# Patient Record
Sex: FEMALE | Race: WHITE | NOT HISPANIC OR LATINO | Employment: UNEMPLOYED | URBAN - METROPOLITAN AREA
[De-identification: names, ages, dates, MRNs, and addresses within clinical notes are randomized per-mention and may not be internally consistent; named-entity substitution may affect disease eponyms.]

---

## 2017-04-18 ENCOUNTER — HOSPITAL ENCOUNTER (EMERGENCY)
Facility: MEDICAL CENTER | Age: 27
End: 2017-04-18
Payer: MEDICAID

## 2017-04-18 VITALS
HEART RATE: 100 BPM | BODY MASS INDEX: 19.03 KG/M2 | DIASTOLIC BLOOD PRESSURE: 61 MMHG | WEIGHT: 132.94 LBS | RESPIRATION RATE: 14 BRPM | HEIGHT: 70 IN | SYSTOLIC BLOOD PRESSURE: 105 MMHG | OXYGEN SATURATION: 97 % | TEMPERATURE: 100.5 F

## 2017-04-18 LAB
ALBUMIN SERPL BCP-MCNC: 3.8 G/DL (ref 3.2–4.9)
ALBUMIN/GLOB SERPL: 1.2 G/DL
ALP SERPL-CCNC: 75 U/L (ref 30–99)
ALT SERPL-CCNC: 16 U/L (ref 2–50)
ANION GAP SERPL CALC-SCNC: 8 MMOL/L (ref 0–11.9)
AST SERPL-CCNC: 23 U/L (ref 12–45)
BASOPHILS # BLD AUTO: 0.3 % (ref 0–1.8)
BASOPHILS # BLD: 0.02 K/UL (ref 0–0.12)
BILIRUB SERPL-MCNC: 0.5 MG/DL (ref 0.1–1.5)
BUN SERPL-MCNC: 8 MG/DL (ref 8–22)
CALCIUM SERPL-MCNC: 9.3 MG/DL (ref 8.5–10.5)
CHLORIDE SERPL-SCNC: 103 MMOL/L (ref 96–112)
CO2 SERPL-SCNC: 23 MMOL/L (ref 20–33)
CREAT SERPL-MCNC: 0.65 MG/DL (ref 0.5–1.4)
EOSINOPHIL # BLD AUTO: 0 K/UL (ref 0–0.51)
EOSINOPHIL NFR BLD: 0 % (ref 0–6.9)
ERYTHROCYTE [DISTWIDTH] IN BLOOD BY AUTOMATED COUNT: 39.8 FL (ref 35.9–50)
GFR SERPL CREATININE-BSD FRML MDRD: >60 ML/MIN/1.73 M 2
GLOBULIN SER CALC-MCNC: 3.1 G/DL (ref 1.9–3.5)
GLUCOSE SERPL-MCNC: 136 MG/DL (ref 65–99)
HCT VFR BLD AUTO: 37.3 % (ref 37–47)
HGB BLD-MCNC: 12.7 G/DL (ref 12–16)
IMM GRANULOCYTES # BLD AUTO: 0.02 K/UL (ref 0–0.11)
IMM GRANULOCYTES NFR BLD AUTO: 0.3 % (ref 0–0.9)
LACTATE BLD-SCNC: 1.4 MMOL/L (ref 0.5–2)
LYMPHOCYTES # BLD AUTO: 0.66 K/UL (ref 1–4.8)
LYMPHOCYTES NFR BLD: 9.6 % (ref 22–41)
MCH RBC QN AUTO: 29.3 PG (ref 27–33)
MCHC RBC AUTO-ENTMCNC: 34 G/DL (ref 33.6–35)
MCV RBC AUTO: 86.1 FL (ref 81.4–97.8)
MONOCYTES # BLD AUTO: 0.41 K/UL (ref 0–0.85)
MONOCYTES NFR BLD AUTO: 6 % (ref 0–13.4)
NEUTROPHILS # BLD AUTO: 5.75 K/UL (ref 2–7.15)
NEUTROPHILS NFR BLD: 83.8 % (ref 44–72)
NRBC # BLD AUTO: 0 K/UL
NRBC BLD AUTO-RTO: 0 /100 WBC
PLATELET # BLD AUTO: 162 K/UL (ref 164–446)
PMV BLD AUTO: 11.1 FL (ref 9–12.9)
POTASSIUM SERPL-SCNC: 3.6 MMOL/L (ref 3.6–5.5)
PROT SERPL-MCNC: 6.9 G/DL (ref 6–8.2)
RBC # BLD AUTO: 4.33 M/UL (ref 4.2–5.4)
SODIUM SERPL-SCNC: 134 MMOL/L (ref 135–145)
WBC # BLD AUTO: 6.9 K/UL (ref 4.8–10.8)

## 2017-04-18 PROCEDURE — 85025 COMPLETE CBC W/AUTO DIFF WBC: CPT

## 2017-04-18 PROCEDURE — 87040 BLOOD CULTURE FOR BACTERIA: CPT

## 2017-04-18 PROCEDURE — 80053 COMPREHEN METABOLIC PANEL: CPT

## 2017-04-18 PROCEDURE — 302449 STATCHG TRIAGE ONLY (STATISTIC)

## 2017-04-18 PROCEDURE — 83605 ASSAY OF LACTIC ACID: CPT

## 2017-04-18 ASSESSMENT — PAIN SCALES - GENERAL: PAINLEVEL_OUTOF10: 7

## 2017-04-18 NOTE — ED NOTES
Pt to triage .  Chief Complaint   Patient presents with   • Body Aches   • Fever   • Nausea   • Abnormal Labs     recent strep infection after abcess to right wrist. pt states she left [facility] from [facility] 2wks ago and now feeling worse

## 2017-04-23 LAB
BACTERIA BLD CULT: NORMAL
SIGNIFICANT IND 70042: NORMAL
SITE SITE: NORMAL
SOURCE SOURCE: NORMAL

## 2017-11-05 ENCOUNTER — HOSPITAL ENCOUNTER (EMERGENCY)
Facility: MEDICAL CENTER | Age: 27
End: 2017-11-05
Attending: EMERGENCY MEDICINE
Payer: MEDICAID

## 2017-11-05 VITALS
OXYGEN SATURATION: 100 % | HEIGHT: 70 IN | RESPIRATION RATE: 16 BRPM | TEMPERATURE: 98 F | BODY MASS INDEX: 18.61 KG/M2 | SYSTOLIC BLOOD PRESSURE: 111 MMHG | HEART RATE: 69 BPM | WEIGHT: 130 LBS | DIASTOLIC BLOOD PRESSURE: 78 MMHG

## 2017-11-05 DIAGNOSIS — T40.1X1A HEROIN OVERDOSE, ACCIDENTAL OR UNINTENTIONAL, INITIAL ENCOUNTER (HCC): ICD-10-CM

## 2017-11-05 LAB — EKG IMPRESSION: NORMAL

## 2017-11-05 PROCEDURE — 99284 EMERGENCY DEPT VISIT MOD MDM: CPT

## 2017-11-05 PROCEDURE — 93005 ELECTROCARDIOGRAM TRACING: CPT | Performed by: EMERGENCY MEDICINE

## 2017-11-05 NOTE — ED NOTES
Chief Complaint   Patient presents with   • Drug Overdose     Patient bib PAIGE, states she just got out of shelter and used heroin this am. Boyfriend called EMS because patient was unresponsive. FD gave 0.4mg IM Narcan. Patient awake, alert. VSS, ERP to eval.

## 2017-11-05 NOTE — ED PROVIDER NOTES
"ED Provider Note    CHIEF COMPLAINT  Chief Complaint   Patient presents with   • Drug Overdose       HPI  Luna River is a 27 y.o. female who presents Via EMS following a heroin overdose.    Patient states she was attempting to \"get high\" and awoke after her boyfriend called 911. Patient was recently incarcerated and had not used heroin for 8 days. Patient denies any physical complaints. Patient denies suicidality.      ALLERGIES  Allergies   Allergen Reactions   • Clonazepam      Pt states that she overdosed on it before. Furthermore, per historical, pt had seizure-like activity while taking Clonazepam as a child.   • Gabadone      Unknown reaction.   • Gabapentin      Causes seizures.   • Tiagabine Hydrochloride      Unknown reaction.       CURRENT MEDICATIONS  Denies    PAST MEDICAL HISTORY   has a past medical history of Anorexia; Bipolar 2 disorder (CMS-HCC); bulemia; Hepatitis C; Impetigo; Major depressive disorder; Mood disorder (CMS-[facility]); Oppositional defiant disorder; Polysubstance abuse; Post-traumatic stress disorder; Schizoaffective disorder; and Scoliosis.    SURGICAL HISTORY   has a past surgical history that includes tonsillectomy; removal of tonsils,<13 y/o; other; and anthony by laparoscopy (2/1/2013).    SOCIAL HISTORY  Social History     Social History Main Topics   • Smoking status: Current Every Day Smoker     Packs/day: 1.00     Years: 7.00     Types: Cigarettes   • Smokeless tobacco: Not on file      Comment: occas   • Alcohol use No      Comment: ocasional   • Drug use:      Types: Intravenous, Inhaled, Injected (Skin Popping), Oral      Comment: heroin, meth   • Sexual activity: Not on file       REVIEW OF SYSTEMS  See HPI for further details.  All other systems are negative except as above in HPI.      PHYSICAL EXAM  VITAL SIGNS: /78   Pulse 75   Temp 36.7 °C (98 °F)   Resp 15   Ht 1.778 m (5' 10\")   Wt 59 kg (130 lb)   SpO2 100%   BMI 18.65 kg/m²     General:  WDWN, " nontoxic appearing in NAD; A+Ox3; V/S as above   Skin: warm and dry; good color; no rash  HEENT: NCAT; EOMs intact; PERRL; no scleral icterus; Poor dentition  Neck: Soft, supple  Cardiovascular: Regular heart rate and rhythm.  No murmurs, rubs, or gallops; pulses 2+ bilaterally radially and DP areas  Lungs: Clear to auscultation with good air movement bilaterally.  No wheezes, rhonchi, or rales.   Abdomen: BS present; soft; NTND; no rebound, guarding, or rigidity.  No organomegaly or pulsatile mass  Extremities: HARDIN x 4; no e/o trauma; no pedal edema; neg Saurabh's  Neurologic: CNs III-XII grossly intact; speech clear; distal sensation intact; strength 5/5 UE/LEs  Psychiatric: Appropriate affect, normal mood      MEDICAL RECORD  I have reviewed patient's medical record and pertinent results are listed below.      COURSE & MEDICAL DECISION MAKING  I have reviewed any medical record information, laboratory studies and radiographic results as noted.    Luna River is a 27 y.o. female who presents complaining of heroin overdose. This was accidental. Patient denies suicidality. Patient has no physical complaints. She has no plans to discontinue heroin use.    Pt was re-evaluated at 2:16 PM  SW asked to meet with patient regarding rehab programs    The patient was observed for greater than 2 hours here in the ER. She remained alert and was discharged.      FINAL IMPRESSION  1. Heroin overdose, accidental or unintentional, initial encounter        Electronically signed by: Daksha Clements, 11/5/2017 11:55 AM

## 2017-11-05 NOTE — DISCHARGE PLANNING
"SW met with pt at bedside. SW educated pt on substance abuse, clinic, and general resources. Pt reported that she is not yet ready to quit, but plans on going to the methadone clinic \"when she feels ready.\" SW stressed the importance of safety and health as an active drug user. Pt stated understanding and reported that she has a \"safe place\" to stay. SW ensured that pt had an understanding of resources and no questions regarding the information. Pt reported that she had no questions or further needs and was encouraged to follow up. SW to remain available.     SW informed RN that pt was MTM eligible. Pt to call MTM for transportation assistance.   "

## 2017-11-05 NOTE — DISCHARGE INSTRUCTIONS
"Return to ER for worsening symptoms or other issues      Overdose, Adult  A person can overdose on alcohol, drugs or both by accident or on purpose. If it was on purpose, it is a serious matter. Professional help should be sought. If the overdose was an accident, certain steps should be taken to make sure that it never happens again.  ACCIDENTAL OVERDOSE  Overdosing on prescription medications can be a result of:  · Not understanding the instructions.  · Misreading the label.  · Forgetting that you took a dose and then taking another by mistake. This situation happens a lot.  To make sure this does not happen again:  · Clarify the correct dosage with your caregiver.  · Place the correct dosage in a \"pill-minder\" container (labeled for each day and time of day).  · Have someone dispense your medicine.  Please be sure to follow-up with your primary care doctor as directed.  INTENTIONAL OVERDOSE  If the overdose was on purpose, it is a serious situation. Taking more than the prescribed amount of medications (including taking someone else's prescription), abusing street drugs or drinking an amount of alcohol that requires medical treatment can show a variety of possible problems. These may indicate you:  · Are depressed or suicidal.  · Are abusing drugs, took too much or combined different drugs to experiment with the effects.  · Mixed alcohol with drugs and did not realize the danger of doing so (this is drug abuse).  · Are suffering addiction to drugs and/or alcohol (also known as chemical dependency).  · Binge drink.  If you have not been referred to a mental health professional for help, it is important that you get help right away. Only a professional can determine which problems may exist and what the best course of treatment may be. It is your responsibility to follow-up with further evaluation or treatment as directed.   Alcohol is responsible for a large number of overdoses and unintended deaths among " "college-age young adults. Binge drinking is consuming 4-5 drinks in a short period of time. The amount of alcohol in standard servings of wine (5 oz.), beer (12 oz.) and distilled spirits (1.5 oz., 80 proof) is the same. Beer or wine can be just as dangerous to the binge drinker as \"hard\" liquor can be.   CONSEQUENCES OF BINGE DRINKING  Alcohol poisoning is the most serious consequence of binge drinking. This is a severe and potentially fatal physical reaction to an alcohol overdose. When too much alcohol is consumed, the brain does not get enough oxygen. The lack of oxygen will eventually cause the brain to shut down the voluntary functions that regulate breathing and heart rate. Symptoms of alcohol poisoning include:  · Vomiting.  · Passing out (unconsciousness).  · Cold, clammy, pale or bluish skin.  · Slow or irregular breathing.  WHAT SHOULD I DO NEXT?  If you have a history of drug abuse or suffer chemical dependency (alcoholism, drug addiction or both), you might consider the following:  · Talk with a qualified substance abuse counselor and consider entering a treatment program.  · Go to a detox facility if necessary.  · If you were attending self-help group meetings, consider returning to them and go often.  · Explore other resources located near you (see sources listed below).  If you are unsure if you have a substance abuse problem, ask yourself the following questions:  · Have you been told by friends or family that drugs/alcohol has become a problem?  · Do you get into fights when drinking or using drugs?  · Do you have blackouts (not remembering what you do while using)?  · Do you lie about use or amounts of drugs or alcohol you consume?  · Do you need chemicals to get you going?  · Do you suffer in work or school performance because of drug or alcohol use?  · Do you get sick from drug or alcohol use but continue to use anyway?  · Do you need drugs or alcohol to relate to people or feel comfortable in " "social situations?  · Do you use drugs or alcohol to forget problems?  If you answered \"Yes\" to any of the above questions, it means you show signs of chemical dependency and a professional evaluation is suggested. The longer the use of drugs and alcohol continues, the problems will become greater.  SEEK IMMEDIATE MEDICAL CARE IF:   · You feel like you might repeat your problematic behavior.  · You need someone to talk to and feel that it should not wait.  · You feel you are a danger to yourself or someone else.  · You feel like you are having a new reaction to medications you are taking, or you are getting worse after leaving a care center.  · You have an overwhelming urge to drink or use drugs.  Addiction cannot be cured, but it can be treated successfully. Treatment centers are listed in the yellow pages under: Cocaine, Narcotics, and Alcoholics Anonymous. Most hospitals and clinics can refer you to a specialized care center. The  government maintains a toll-free number for obtaining treatment referrals: 1-784.910.3979 or 1-738.123.2514 (TDD) and maintains a website: http://findtreatment.samhsa.gov. Other websites for additional information are: www.mentalhealth.samhsa.gov. and www.feroz.gov.  [place] treatment resources are listed in each Province with listings available under [employer] or similar titles.  Document Released: 12/20/2004 Document Revised: 03/11/2013 Document Reviewed: 11/11/2009  ExitCare® Patient Information ©2014 [employer], [employer].    "

## 2018-02-16 ENCOUNTER — HOSPITAL ENCOUNTER (EMERGENCY)
Facility: MEDICAL CENTER | Age: 28
End: 2018-02-17
Payer: MEDICAID

## 2018-02-16 VITALS
TEMPERATURE: 99.8 F | HEART RATE: 97 BPM | DIASTOLIC BLOOD PRESSURE: 70 MMHG | SYSTOLIC BLOOD PRESSURE: 118 MMHG | HEIGHT: 70 IN | BODY MASS INDEX: 19.73 KG/M2 | RESPIRATION RATE: 16 BRPM | OXYGEN SATURATION: 98 % | WEIGHT: 137.79 LBS

## 2018-02-16 PROCEDURE — 302449 STATCHG TRIAGE ONLY (STATISTIC)

## 2018-02-16 ASSESSMENT — PAIN SCALES - GENERAL: PAINLEVEL_OUTOF10: 7

## 2018-02-17 ENCOUNTER — HOSPITAL ENCOUNTER (EMERGENCY)
Dept: HOSPITAL 8 - ED | Age: 28
Discharge: HOME | End: 2018-02-17
Payer: MEDICAID

## 2018-02-17 VITALS — WEIGHT: 138.01 LBS | HEIGHT: 70 IN | BODY MASS INDEX: 19.76 KG/M2

## 2018-02-17 VITALS — DIASTOLIC BLOOD PRESSURE: 86 MMHG | SYSTOLIC BLOOD PRESSURE: 125 MMHG

## 2018-02-17 DIAGNOSIS — F11.10: ICD-10-CM

## 2018-02-17 DIAGNOSIS — L02.414: Primary | ICD-10-CM

## 2018-02-17 PROCEDURE — 10060 I&D ABSCESS SIMPLE/SINGLE: CPT

## 2018-02-17 PROCEDURE — 99283 EMERGENCY DEPT VISIT LOW MDM: CPT

## 2018-02-17 NOTE — ED TRIAGE NOTES
"Chief Complaint   Patient presents with   • Wrist Pain     L wrist pain at abscess site   • Abscess     Pt reports is a heroin user and that abscess began to develop 2 weeks ago      /70   Pulse 97   Temp 37.7 °C (99.8 °F) (Temporal)   Resp 16   Ht 1.778 m (5' 10\")   Wt 62.5 kg (137 lb 12.6 oz)   SpO2 98%   BMI 19.77 kg/m²     Ambulatory to triage, steady on feet. Presents for above complaint. Reports was clean but began using heroin recently again. Large swollen, reddened area noted to L hand/wrist. Pt returned to [facility], educated on wait times and triage, instructed to notify staff of worsening symptoms.   "

## 2018-03-11 ENCOUNTER — HOSPITAL ENCOUNTER (EMERGENCY)
Dept: HOSPITAL 8 - ED | Age: 28
Discharge: HOME | End: 2018-03-11
Payer: SELF-PAY

## 2018-03-11 VITALS — SYSTOLIC BLOOD PRESSURE: 93 MMHG | DIASTOLIC BLOOD PRESSURE: 55 MMHG

## 2018-03-11 VITALS — BODY MASS INDEX: 19.13 KG/M2 | HEIGHT: 70 IN | WEIGHT: 133.6 LBS

## 2018-03-11 DIAGNOSIS — L02.414: ICD-10-CM

## 2018-03-11 DIAGNOSIS — L02.413: ICD-10-CM

## 2018-03-11 DIAGNOSIS — L03.113: ICD-10-CM

## 2018-03-11 DIAGNOSIS — Z90.49: ICD-10-CM

## 2018-03-11 DIAGNOSIS — L03.114: ICD-10-CM

## 2018-03-11 DIAGNOSIS — L02.11: Primary | ICD-10-CM

## 2018-03-11 LAB
ALBUMIN SERPL-MCNC: 3.2 G/DL (ref 3.4–5)
ALP SERPL-CCNC: 97 U/L (ref 45–117)
ALT SERPL-CCNC: 20 U/L (ref 12–78)
ANION GAP SERPL CALC-SCNC: 7 MMOL/L (ref 5–15)
BASOPHILS # BLD AUTO: 0.02 X10^3/UL (ref 0–0.1)
BASOPHILS NFR BLD AUTO: 0 % (ref 0–1)
BILIRUB SERPL-MCNC: 0.4 MG/DL (ref 0.2–1)
CALCIUM SERPL-MCNC: 9.1 MG/DL (ref 8.5–10.1)
CHLORIDE SERPL-SCNC: 104 MMOL/L (ref 98–107)
CREAT SERPL-MCNC: 0.73 MG/DL (ref 0.55–1.02)
EOSINOPHIL # BLD AUTO: 0 X10^3/UL (ref 0–0.4)
EOSINOPHIL NFR BLD AUTO: 0 % (ref 1–7)
ERYTHROCYTE [DISTWIDTH] IN BLOOD BY AUTOMATED COUNT: 12.9 % (ref 9.6–15.2)
LYMPHOCYTES # BLD AUTO: 1.85 X10^3/UL (ref 1–3.4)
LYMPHOCYTES NFR BLD AUTO: 20 % (ref 22–44)
MCH RBC QN AUTO: 29.7 PG (ref 27–34.8)
MCHC RBC AUTO-ENTMCNC: 33.6 G/DL (ref 32.4–35.8)
MCV RBC AUTO: 88.4 FL (ref 80–100)
MD: NO
MONOCYTES # BLD AUTO: 0.47 X10^3/UL (ref 0.2–0.8)
MONOCYTES NFR BLD AUTO: 5 % (ref 2–9)
NEUTROPHILS # BLD AUTO: 6.96 X10^3/UL (ref 1.8–6.8)
NEUTROPHILS NFR BLD AUTO: 75 % (ref 42–75)
PLATELET # BLD AUTO: 342 X10^3/UL (ref 130–400)
PMV BLD AUTO: 7.1 FL (ref 7.4–10.4)
PROT SERPL-MCNC: 7.9 G/DL (ref 6.4–8.2)
RBC # BLD AUTO: 4.32 X10^6/UL (ref 3.82–5.3)

## 2018-03-11 PROCEDURE — 87040 BLOOD CULTURE FOR BACTERIA: CPT

## 2018-03-11 PROCEDURE — 99284 EMERGENCY DEPT VISIT MOD MDM: CPT

## 2018-03-11 PROCEDURE — 83605 ASSAY OF LACTIC ACID: CPT

## 2018-03-11 PROCEDURE — 10061 I&D ABSCESS COMP/MULTIPLE: CPT

## 2018-03-11 PROCEDURE — 36415 COLL VENOUS BLD VENIPUNCTURE: CPT

## 2018-03-11 PROCEDURE — 85025 COMPLETE CBC W/AUTO DIFF WBC: CPT

## 2018-03-11 PROCEDURE — 80053 COMPREHEN METABOLIC PANEL: CPT

## 2018-03-28 ENCOUNTER — HOSPITAL ENCOUNTER (EMERGENCY)
Dept: HOSPITAL 8 - ED | Age: 28
Discharge: HOME | End: 2018-03-28
Payer: COMMERCIAL

## 2018-03-28 VITALS — BODY MASS INDEX: 18.94 KG/M2 | HEIGHT: 70 IN | WEIGHT: 132.28 LBS

## 2018-03-28 VITALS — DIASTOLIC BLOOD PRESSURE: 71 MMHG | SYSTOLIC BLOOD PRESSURE: 115 MMHG

## 2018-03-28 DIAGNOSIS — F11.129: ICD-10-CM

## 2018-03-28 DIAGNOSIS — R55: Primary | ICD-10-CM

## 2018-03-28 DIAGNOSIS — F17.210: ICD-10-CM

## 2018-03-28 DIAGNOSIS — L02.512: ICD-10-CM

## 2018-03-28 DIAGNOSIS — Z90.49: ICD-10-CM

## 2018-03-28 LAB
ALBUMIN SERPL-MCNC: 3.5 G/DL (ref 3.4–5)
ANION GAP SERPL CALC-SCNC: 4 MMOL/L (ref 5–15)
BASOPHILS # BLD AUTO: 0.03 X10^3/UL (ref 0–0.1)
BASOPHILS NFR BLD AUTO: 0 % (ref 0–1)
CALCIUM SERPL-MCNC: 9.1 MG/DL (ref 8.5–10.1)
CHLORIDE SERPL-SCNC: 104 MMOL/L (ref 98–107)
CREAT SERPL-MCNC: 0.91 MG/DL (ref 0.55–1.02)
EOSINOPHIL # BLD AUTO: 0.01 X10^3/UL (ref 0–0.4)
EOSINOPHIL NFR BLD AUTO: 0 % (ref 1–7)
ERYTHROCYTE [DISTWIDTH] IN BLOOD BY AUTOMATED COUNT: 13 % (ref 9.6–15.2)
LYMPHOCYTES # BLD AUTO: 1.51 X10^3/UL (ref 1–3.4)
LYMPHOCYTES NFR BLD AUTO: 10 % (ref 22–44)
MCH RBC QN AUTO: 30.2 PG (ref 27–34.8)
MCHC RBC AUTO-ENTMCNC: 33.6 G/DL (ref 32.4–35.8)
MCV RBC AUTO: 89.8 FL (ref 80–100)
MD: NO
MONOCYTES # BLD AUTO: 0.71 X10^3/UL (ref 0.2–0.8)
MONOCYTES NFR BLD AUTO: 5 % (ref 2–9)
NEUTROPHILS # BLD AUTO: 13.11 X10^3/UL (ref 1.8–6.8)
NEUTROPHILS NFR BLD AUTO: 85 % (ref 42–75)
PLATELET # BLD AUTO: 286 X10^3/UL (ref 130–400)
PMV BLD AUTO: 8.1 FL (ref 7.4–10.4)
RBC # BLD AUTO: 4.89 X10^6/UL (ref 3.82–5.3)

## 2018-03-28 PROCEDURE — 85025 COMPLETE CBC W/AUTO DIFF WBC: CPT

## 2018-03-28 PROCEDURE — 93005 ELECTROCARDIOGRAM TRACING: CPT

## 2018-03-28 PROCEDURE — 84703 CHORIONIC GONADOTROPIN ASSAY: CPT

## 2018-03-28 PROCEDURE — 80307 DRUG TEST PRSMV CHEM ANLYZR: CPT

## 2018-03-28 PROCEDURE — 82040 ASSAY OF SERUM ALBUMIN: CPT

## 2018-03-28 PROCEDURE — 96374 THER/PROPH/DIAG INJ IV PUSH: CPT

## 2018-03-28 PROCEDURE — 96361 HYDRATE IV INFUSION ADD-ON: CPT

## 2018-03-28 PROCEDURE — 36415 COLL VENOUS BLD VENIPUNCTURE: CPT

## 2018-03-28 PROCEDURE — 99285 EMERGENCY DEPT VISIT HI MDM: CPT

## 2018-03-28 PROCEDURE — 80048 BASIC METABOLIC PNL TOTAL CA: CPT

## 2018-05-19 ENCOUNTER — HOSPITAL ENCOUNTER (EMERGENCY)
Facility: MEDICAL CENTER | Age: 28
End: 2018-05-19
Attending: EMERGENCY MEDICINE

## 2018-05-19 VITALS
SYSTOLIC BLOOD PRESSURE: 107 MMHG | TEMPERATURE: 97.5 F | HEART RATE: 87 BPM | BODY MASS INDEX: 19.16 KG/M2 | RESPIRATION RATE: 16 BRPM | WEIGHT: 133.82 LBS | DIASTOLIC BLOOD PRESSURE: 76 MMHG | HEIGHT: 70 IN | OXYGEN SATURATION: 98 %

## 2018-05-19 DIAGNOSIS — L03.90 CELLULITIS, UNSPECIFIED CELLULITIS SITE: ICD-10-CM

## 2018-05-19 PROCEDURE — 700102 HCHG RX REV CODE 250 W/ 637 OVERRIDE(OP): Performed by: EMERGENCY MEDICINE

## 2018-05-19 PROCEDURE — A9270 NON-COVERED ITEM OR SERVICE: HCPCS | Performed by: EMERGENCY MEDICINE

## 2018-05-19 PROCEDURE — 99284 EMERGENCY DEPT VISIT MOD MDM: CPT

## 2018-05-19 RX ORDER — SULFAMETHOXAZOLE AND TRIMETHOPRIM 800; 160 MG/1; MG/1
1 TABLET ORAL ONCE
Status: COMPLETED | OUTPATIENT
Start: 2018-05-19 | End: 2018-05-19

## 2018-05-19 RX ORDER — CEPHALEXIN 500 MG/1
500 CAPSULE ORAL 4 TIMES DAILY
Qty: 28 CAP | Refills: 0 | Status: SHIPPED | OUTPATIENT
Start: 2018-05-19 | End: 2018-05-26

## 2018-05-19 RX ORDER — SULFAMETHOXAZOLE AND TRIMETHOPRIM 800; 160 MG/1; MG/1
1 TABLET ORAL EVERY 12 HOURS
Qty: 14 TAB | Refills: 0 | Status: SHIPPED | OUTPATIENT
Start: 2018-05-19 | End: 2018-05-26

## 2018-05-19 RX ORDER — CEPHALEXIN 500 MG/1
500 CAPSULE ORAL ONCE
Status: COMPLETED | OUTPATIENT
Start: 2018-05-19 | End: 2018-05-19

## 2018-05-19 RX ADMIN — SULFAMETHOXAZOLE AND TRIMETHOPRIM 1 TABLET: 800; 160 TABLET ORAL at 10:23

## 2018-05-19 RX ADMIN — CEPHALEXIN 500 MG: 500 CAPSULE ORAL at 10:24

## 2018-05-19 ASSESSMENT — PAIN SCALES - GENERAL: PAINLEVEL_OUTOF10: 4

## 2018-05-19 ASSESSMENT — ENCOUNTER SYMPTOMS: FEVER: 0

## 2018-05-19 ASSESSMENT — LIFESTYLE VARIABLES: DO YOU DRINK ALCOHOL: NO

## 2018-05-19 NOTE — ED TRIAGE NOTES
"Chief Complaint   Patient presents with   • Abscess     Pt has several abscesses on arms and legs from shooting up.      /77   Pulse (!) 120   Temp 36.4 °C (97.5 °F)   Resp 18   Ht 1.778 m (5' 10\")   Wt 60.7 kg (133 lb 13.1 oz)   SpO2 98%   BMI 19.20 kg/m²   Pt placed back in lobby, educated on triage process, and told to inform staff of any change in condition.     "

## 2018-05-19 NOTE — ED NOTES
Discharge instructions given to pt including follow up w/[facility] or to return if worse.  Denies any new complaints. Discharged w/steady gait and stable vitals. Afebrile. Prescriptions x 2 given to pt.  Crackers and apple juice provided to pt.

## 2018-05-19 NOTE — ED PROVIDER NOTES
ED Provider Note    Scribed for Ariel Sauceda M.D. by Delonte Thomas. 5/19/2018, 9:54 AM.    Means of arrival: Walk-in  History obtained from: Patient  History limited by: None    CHIEF COMPLAINT  Chief Complaint   Patient presents with   • Abscess     Pt has several abscesses on arms and legs from shooting up.        HPI  Luna River is a 28 y.o. female who presents to the Emergency Department complaining of several possible abscesses as well diffuse bruising located on her bilateral upper and lower extremities secondary to IV heroin use. She reportedly lanced one of the abscesses yesterday. She denies fever. Her tetanus shot is up to date. She has no known antibiotic allergies. Patient is still using IV heroin and last used two days ago. She is attempting to quit using heroin and she has attempted to quit several times in the past. She is aware of substance addiction resources.    REVIEW OF SYSTEMS  Review of Systems   Constitutional: Negative for fever.   Skin:        Positive for abscesses and bruising.   E    PAST MEDICAL HISTORY   has a past medical history of Anorexia; Bipolar 2 disorder (HCC); bulemia; Hepatitis C; Impetigo; Major depressive disorder; Mood disorder (HCC); Oppositional defiant disorder; Polysubstance abuse; Post-traumatic stress disorder; Schizoaffective disorder; and Scoliosis.    SURGICAL HISTORY   has a past surgical history that includes tonsillectomy; removal of tonsils,<13 y/o; other; and anthony by laparoscopy (2/1/2013).    SOCIAL HISTORY  Social History   Substance Use Topics   • Smoking status: Current Every Day Smoker     Packs/day: 1.00     Years: 7.00     Types: Cigarettes              • Alcohol use No      Comment: ocasional      History   Drug Use   • Types: Intravenous, Inhaled, Injected (Skin Popping), Oral     Comment: heroin, meth       FAMILY HISTORY  None noted.    CURRENT MEDICATIONS  Home Medications     Reviewed by Haleigh Thomas R.N. (Registered  "Nurse) on 05/19/18 at 0934  Med List Status: Complete   Medication Last Dose Status        Patient Juan Taking any Medications                       ALLERGIES  Allergies   Allergen Reactions   • Clonazepam      Pt states that she overdosed on it before. Furthermore, per historical, pt had seizure-like activity while taking Clonazepam as a child.   • Gabadone      Unknown reaction.   • Gabapentin      Causes seizures.   • Tiagabine Hydrochloride      Unknown reaction.       PHYSICAL EXAM  VITAL SIGNS: /77   Pulse (!) 120   Temp 36.4 °C (97.5 °F)   Resp 18   Ht 1.778 m (5' 10\")   Wt 60.7 kg (133 lb 13.1 oz)   SpO2 98%   BMI 19.20 kg/m²     Constitutional: Alert in no apparent distress.  HENT: Normocephalic, Bilateral external ears normal. Nose normal. Poor dentition.  Lymphadenopathy: No axillary or inguinal lymphadenopathy  Eyes: Pupils are equal and reactive. Conjunctiva normal, non-icteric.   Thorax & Lungs: Easy unlabored respirations  Abdomen:  No gross signs of peritonitis, no pain with movement   Skin: Visualized skin is  Dry, No erythema, No rash.   Extremities:  No edema, No asymmetry. Bruising on the bilateral lower extremities. Multiple sites of erythema and excoriation, no obvious abscesses  Neurologic: Alert, Grossly non-focal.   Psychiatric: Affect and Mood normal    COURSE & MEDICAL DECISION MAKING  Nursing notes, VS, PMSFHx reviewed in chart.    9:54 AM - Patient seen and examined at bedside. Patient will be treated with cephalexin capsule 500 mg and sulfamethoxazole-trimethoprim 800-160 mg tablet. I will rule out abscess by ultrasound.    10:19 AM I evaluated for abscesses by ultrasound at this time. No abscess formation was found. I discussed her above findings and plans for discharge with a prescription for Keflex and Bactrim DS. She was given a referral to [facility] to establish a primary care and instructed to return to the ED if her symptoms worsen. Patient understands " and agrees.      The patient will return for new or worsening symptoms and is stable at the time of discharge.      DISPOSITION:  Patient will be discharged home in stable condition.    FOLLOW UP:  [address] [address]  [phone]          OUTPATIENT MEDICATIONS:  New Prescriptions    CEPHALEXIN (KEFLEX) 500 MG CAP    Take 1 Cap by mouth 4 times a day for 7 days.    SULFAMETHOXAZOLE-TRIMETHOPRIM (BACTRIM DS) 800-160 MG TABLET    Take 1 Tab by mouth every 12 hours for 7 days.         FINAL IMPRESSION  1. Cellulitis, unspecified cellulitis site          Delonte BOONE (Scribe), am scribing for, and in the presence of, Ariel Sauceda M.D..    Electronically signed by: Delonte Thomas (Scribe), 5/19/2018    Ariel BOONE M.D. personally performed the services described in this documentation, as scribed by Delonte Thomas in my presence, and it is both accurate and complete.    The note accurately reflects work and decisions made by me.  Ariel Sauceda  5/19/2018  3:44 PM

## 2018-05-19 NOTE — ED NOTES
Pt ambulated to room w/steady gait. Chart up for erp to see.  Changing into hospital gown. Barbra fall risk assessment done. Low risk.

## 2018-08-10 ENCOUNTER — HOSPITAL ENCOUNTER (EMERGENCY)
Facility: MEDICAL CENTER | Age: 28
End: 2018-08-10
Attending: EMERGENCY MEDICINE

## 2018-08-10 VITALS
SYSTOLIC BLOOD PRESSURE: 109 MMHG | BODY MASS INDEX: 18.84 KG/M2 | OXYGEN SATURATION: 98 % | TEMPERATURE: 98.4 F | RESPIRATION RATE: 18 BRPM | WEIGHT: 127.21 LBS | HEIGHT: 69 IN | HEART RATE: 100 BPM | DIASTOLIC BLOOD PRESSURE: 69 MMHG

## 2018-08-10 DIAGNOSIS — K04.7 DENTAL ABSCESS: ICD-10-CM

## 2018-08-10 PROCEDURE — 700101 HCHG RX REV CODE 250: Performed by: EMERGENCY MEDICINE

## 2018-08-10 PROCEDURE — 99283 EMERGENCY DEPT VISIT LOW MDM: CPT

## 2018-08-10 RX ORDER — PENICILLIN V POTASSIUM 500 MG/1
500 TABLET ORAL 4 TIMES DAILY
Qty: 40 TAB | Refills: 0 | Status: SHIPPED | OUTPATIENT
Start: 2018-08-10 | End: 2018-08-20

## 2018-08-10 RX ADMIN — LIDOCAINE HYDROCHLORIDE 20 ML: 10 INJECTION, SOLUTION INFILTRATION; PERINEURAL at 09:41

## 2018-08-10 ASSESSMENT — ENCOUNTER SYMPTOMS
FEVER: 0
VOMITING: 0

## 2018-08-10 ASSESSMENT — PAIN SCALES - GENERAL: PAINLEVEL_OUTOF10: 4

## 2018-08-10 NOTE — ED PROVIDER NOTES
ED Provider Note    ED Provider Note    Primary care provider: Pcp Pt States None  Means of arrival: POV  History obtained from: Patient  History limited by: None    CHIEF COMPLAINT  Chief Complaint   Patient presents with   • Tooth Abscess       HPI  Luna iRver is a 28 y.o. female who presents to the Emergency Department with a chief complaint of dental pain and possibly tooth abscess.  She states that her left bottom tooth has been bothering her for some time now on and off but she suddenly developed swelling this morning.  No difficulty swallowing.  No fever.    REVIEW OF SYSTEMS  Review of Systems   Constitutional: Negative for fever.   HENT: Negative for congestion.    Gastrointestinal: Negative for vomiting.       PAST MEDICAL HISTORY   has a past medical history of Anorexia; Bipolar 2 disorder (HCC); bulemia; Hepatitis C; Impetigo; Major depressive disorder; Mood disorder (HCC); Oppositional defiant disorder; Polysubstance abuse; Post-traumatic stress disorder; Schizoaffective disorder; and Scoliosis.    SURGICAL HISTORY   has a past surgical history that includes tonsillectomy; removal of tonsils,<13 y/o; other; and anthony by laparoscopy (2/1/2013).    SOCIAL HISTORY  Social History   Substance Use Topics   • Smoking status: Current Every Day Smoker     Packs/day: 1.00     Years: 7.00     Types: Cigarettes   • Smokeless tobacco: Never Used      Comment: occas   • Alcohol use No      History   Drug Use   • Types: Intravenous, Inhaled, Injected (Skin Popping), Oral     Comment: heroin, meth       FAMILY HISTORY  History reviewed. No pertinent family history.    CURRENT MEDICATIONS  Home Medications     Reviewed by Taylor Zaman R.N. (Registered Nurse) on 08/10/18 at 0754  Med List Status: Complete   Medication Last Dose Status        Patient Juan Taking any Medications                       ALLERGIES  Allergies   Allergen Reactions   • Clonazepam      Pt states that she overdosed on it before.  "Furthermore, per historical, pt had seizure-like activity while taking Clonazepam as a child.   • Gabadone      Unknown reaction.   • Gabapentin      Causes seizures.   • Tiagabine Hydrochloride      Unknown reaction.       PHYSICAL EXAM  VITAL SIGNS: /69   Pulse 100   Temp 36.9 °C (98.4 °F)   Resp 18   Ht 1.753 m (5' 9\")   Wt 57.7 kg (127 lb 3.3 oz)   SpO2 98%   BMI 18.78 kg/m²   Vitals reviewed.  Constitutional: Patient is oriented to person, place, and time. Appears well-developed and well-nourished. No distress.    Head: Normocephalic and atraumatic.   Mouth/Throat: Oropharynx is clear and moist, no exudates. There is fullness, tenderness to the gum on the buccal surface on the left lower aspect.  Her first molar is decayed down to the gumline.  There is a similar pattern on the opposite side where she is asymptomatic at this time.  Eyes: Conjunctivae are normal.   Neck: Normal range of motion. Neck supple.  Cardiovascular: Tachycardia, regular rhythm and normal heart sounds.  Pulmonary/Chest: Effort normal and breath sounds normal. No respiratory distress  Musculoskeletal: No edema  Lymphadenopathy: No cervical adenopathy.   Neurological: No focal deficits.   Skin: Skin is warm and dry. No erythema. No pallor.   Psychiatric: Patient has a normal mood and affect.     Incision and Drainage Procedure    Indication: Abscess    Location: right lower dental    Procedure: The patient was positioned appropriately. Local anesthesia was obtained by infiltration using 1% Lidocaine without epinephrine.  An incision was then made over the apex of the lesion and no material was expressed.    The patient tolerated the procedure well.    Complications: None      COURSE & MEDICAL DECISION MAKING  Pertinent Labs & Imaging studies reviewed. (See chart for details)    Obtained and reviewed past medical records.  Patient's been seen in the past for abscesses related to IV drug injection.    7:59 AM - Patient seen and " examined at bedside.  This is a pleasant 28-year-old female who presents with swelling to her left lower face consistent with likely dental abscess.  She has a decaying tooth adjacent to this.  No swelling to the floor of her mouth.  She is managing her own secretions.  No difficulty swallowing.  No fever.  I&D done as above, please see note.  No significant pus drained.  Patient be discharged home with antibiotics.  She is advised to see a dentist as soon as possible.  I have also recommended application of warm moist compress couple times a day.  She is given strict return precautions.  If the swelling gets worse or she has new concerns, she should return to the ED for reevaluation and she agrees.    Patient be discharged home in stable condition.    FINAL IMPRESSION  1. Dental abscess

## 2018-08-10 NOTE — ED NOTES
Pt provided with discharge instructions, prescriptions x1, instructions for follow up appointment and dental referral sheet, s/s of when to seek emergency care.  Pt verbalizes understanding.  Pt discharged in good condition.

## 2018-08-10 NOTE — DISCHARGE INSTRUCTIONS
Dental Abscess  Introduction  A dental abscess is pus in or around a tooth.  Follow these instructions at home:  · Take medicines only as told by your dentist.  · If you were prescribed antibiotic medicine, finish all of it even if you start to feel better.  · Rinse your mouth (gargle) often with salt water.  · Do not drive or use heavy machinery, like a , while taking pain medicine.  · Do not apply heat to the outside of your mouth.  · Keep all follow-up visits as told by your dentist. This is important.  Contact a doctor if:  · Your pain is worse, and medicine does not help.  Get help right away if:  · You have a fever or chills.  · Your symptoms suddenly get worse.  · You have a very bad headache.  · You have problems breathing or swallowing.  · You have trouble opening your mouth.  · You have puffiness (swelling) in your neck or around your eye.  This information is not intended to replace advice given to you by your health care provider. Make sure you discuss any questions you have with your health care provider.  Document Released: 05/03/2016 Document Revised: 05/25/2017 Document Reviewed: 12/15/2015  © 2017 [employer]

## 2018-11-27 ENCOUNTER — HOSPITAL ENCOUNTER (INPATIENT)
Facility: MEDICAL CENTER | Age: 28
LOS: 1 days | DRG: 603 | End: 2018-11-27
Attending: EMERGENCY MEDICINE | Admitting: HOSPITALIST

## 2018-11-27 ENCOUNTER — APPOINTMENT (OUTPATIENT)
Dept: RADIOLOGY | Facility: MEDICAL CENTER | Age: 28
DRG: 603 | End: 2018-11-27
Attending: EMERGENCY MEDICINE

## 2018-11-27 VITALS
WEIGHT: 139.11 LBS | BODY MASS INDEX: 22.36 KG/M2 | HEIGHT: 66 IN | SYSTOLIC BLOOD PRESSURE: 126 MMHG | HEART RATE: 103 BPM | DIASTOLIC BLOOD PRESSURE: 81 MMHG | TEMPERATURE: 97.9 F | OXYGEN SATURATION: 96 % | RESPIRATION RATE: 16 BRPM

## 2018-11-27 DIAGNOSIS — F19.10 IV DRUG ABUSE (HCC): ICD-10-CM

## 2018-11-27 DIAGNOSIS — L03.115 CELLULITIS OF RIGHT LOWER EXTREMITY: ICD-10-CM

## 2018-11-27 PROBLEM — L03.90 CELLULITIS: Status: ACTIVE | Noted: 2018-11-27

## 2018-11-27 PROBLEM — L02.91 CUTANEOUS ABSCESS: Status: ACTIVE | Noted: 2018-11-27

## 2018-11-27 LAB
ALBUMIN SERPL BCP-MCNC: 3.9 G/DL (ref 3.2–4.9)
ALBUMIN/GLOB SERPL: 0.9 G/DL
ALP SERPL-CCNC: 116 U/L (ref 30–99)
ALT SERPL-CCNC: 14 U/L (ref 2–50)
ANION GAP SERPL CALC-SCNC: 9 MMOL/L (ref 0–11.9)
AST SERPL-CCNC: 29 U/L (ref 12–45)
BASOPHILS # BLD AUTO: 0.2 % (ref 0–1.8)
BASOPHILS # BLD: 0.01 K/UL (ref 0–0.12)
BILIRUB SERPL-MCNC: 0.3 MG/DL (ref 0.1–1.5)
BUN SERPL-MCNC: 10 MG/DL (ref 8–22)
CALCIUM SERPL-MCNC: 9.6 MG/DL (ref 8.5–10.5)
CHLORIDE SERPL-SCNC: 101 MMOL/L (ref 96–112)
CO2 SERPL-SCNC: 27 MMOL/L (ref 20–33)
CREAT SERPL-MCNC: 0.68 MG/DL (ref 0.5–1.4)
CRP SERPL HS-MCNC: 2.27 MG/DL (ref 0–0.75)
EOSINOPHIL # BLD AUTO: 0.01 K/UL (ref 0–0.51)
EOSINOPHIL NFR BLD: 0.2 % (ref 0–6.9)
ERYTHROCYTE [DISTWIDTH] IN BLOOD BY AUTOMATED COUNT: 40.8 FL (ref 35.9–50)
ERYTHROCYTE [SEDIMENTATION RATE] IN BLOOD BY WESTERGREN METHOD: 79 MM/HOUR (ref 0–20)
GLOBULIN SER CALC-MCNC: 4.3 G/DL (ref 1.9–3.5)
GLUCOSE SERPL-MCNC: 69 MG/DL (ref 65–99)
GRAM STN SPEC: NORMAL
HCT VFR BLD AUTO: 37.8 % (ref 37–47)
HGB BLD-MCNC: 12.9 G/DL (ref 12–16)
IMM GRANULOCYTES # BLD AUTO: 0.05 K/UL (ref 0–0.11)
IMM GRANULOCYTES NFR BLD AUTO: 0.8 % (ref 0–0.9)
LACTATE BLD-SCNC: 1 MMOL/L (ref 0.5–2)
LYMPHOCYTES # BLD AUTO: 2.24 K/UL (ref 1–4.8)
LYMPHOCYTES NFR BLD: 34.8 % (ref 22–41)
MCH RBC QN AUTO: 29.7 PG (ref 27–33)
MCHC RBC AUTO-ENTMCNC: 34.1 G/DL (ref 33.6–35)
MCV RBC AUTO: 86.9 FL (ref 81.4–97.8)
MONOCYTES # BLD AUTO: 0.34 K/UL (ref 0–0.85)
MONOCYTES NFR BLD AUTO: 5.3 % (ref 0–13.4)
NEUTROPHILS # BLD AUTO: 3.78 K/UL (ref 2–7.15)
NEUTROPHILS NFR BLD: 58.7 % (ref 44–72)
NRBC # BLD AUTO: 0 K/UL
NRBC BLD-RTO: 0 /100 WBC
PLATELET # BLD AUTO: 305 K/UL (ref 164–446)
PMV BLD AUTO: 9.6 FL (ref 9–12.9)
POTASSIUM SERPL-SCNC: 4 MMOL/L (ref 3.6–5.5)
PROT SERPL-MCNC: 8.2 G/DL (ref 6–8.2)
RBC # BLD AUTO: 4.35 M/UL (ref 4.2–5.4)
SIGNIFICANT IND 70042: NORMAL
SITE SITE: NORMAL
SODIUM SERPL-SCNC: 137 MMOL/L (ref 135–145)
SOURCE SOURCE: NORMAL
WBC # BLD AUTO: 6.4 K/UL (ref 4.8–10.8)

## 2018-11-27 PROCEDURE — 99223 1ST HOSP IP/OBS HIGH 75: CPT | Performed by: HOSPITALIST

## 2018-11-27 PROCEDURE — 87070 CULTURE OTHR SPECIMN AEROBIC: CPT

## 2018-11-27 PROCEDURE — 87040 BLOOD CULTURE FOR BACTERIA: CPT | Mod: 91

## 2018-11-27 PROCEDURE — 73630 X-RAY EXAM OF FOOT: CPT | Mod: RT

## 2018-11-27 PROCEDURE — 80053 COMPREHEN METABOLIC PANEL: CPT

## 2018-11-27 PROCEDURE — 87205 SMEAR GRAM STAIN: CPT

## 2018-11-27 PROCEDURE — 02HV33Z INSERTION OF INFUSION DEVICE INTO SUPERIOR VENA CAVA, PERCUTANEOUS APPROACH: ICD-10-PCS | Performed by: EMERGENCY MEDICINE

## 2018-11-27 PROCEDURE — 93971 EXTREMITY STUDY: CPT | Mod: RT

## 2018-11-27 PROCEDURE — 83605 ASSAY OF LACTIC ACID: CPT

## 2018-11-27 PROCEDURE — 36556 INSERT NON-TUNNEL CV CATH: CPT

## 2018-11-27 PROCEDURE — C1751 CATH, INF, PER/CENT/MIDLINE: HCPCS

## 2018-11-27 PROCEDURE — 85652 RBC SED RATE AUTOMATED: CPT

## 2018-11-27 PROCEDURE — 99285 EMERGENCY DEPT VISIT HI MDM: CPT

## 2018-11-27 PROCEDURE — 71045 X-RAY EXAM CHEST 1 VIEW: CPT

## 2018-11-27 PROCEDURE — 700111 HCHG RX REV CODE 636 W/ 250 OVERRIDE (IP): Performed by: HOSPITALIST

## 2018-11-27 PROCEDURE — 700105 HCHG RX REV CODE 258: Performed by: EMERGENCY MEDICINE

## 2018-11-27 PROCEDURE — 86140 C-REACTIVE PROTEIN: CPT

## 2018-11-27 PROCEDURE — 700105 HCHG RX REV CODE 258: Performed by: HOSPITALIST

## 2018-11-27 PROCEDURE — 700111 HCHG RX REV CODE 636 W/ 250 OVERRIDE (IP): Performed by: EMERGENCY MEDICINE

## 2018-11-27 PROCEDURE — 96365 THER/PROPH/DIAG IV INF INIT: CPT

## 2018-11-27 PROCEDURE — 36415 COLL VENOUS BLD VENIPUNCTURE: CPT

## 2018-11-27 PROCEDURE — 96367 TX/PROPH/DG ADDL SEQ IV INF: CPT

## 2018-11-27 PROCEDURE — 85025 COMPLETE CBC W/AUTO DIFF WBC: CPT

## 2018-11-27 PROCEDURE — 770006 HCHG ROOM/CARE - MED/SURG/GYN SEMI*

## 2018-11-27 RX ORDER — ONDANSETRON 2 MG/ML
4 INJECTION INTRAMUSCULAR; INTRAVENOUS EVERY 4 HOURS PRN
Status: DISCONTINUED | OUTPATIENT
Start: 2018-11-27 | End: 2018-11-27 | Stop reason: HOSPADM

## 2018-11-27 RX ORDER — PROMETHAZINE HYDROCHLORIDE 25 MG/1
12.5-25 SUPPOSITORY RECTAL EVERY 4 HOURS PRN
Status: DISCONTINUED | OUTPATIENT
Start: 2018-11-27 | End: 2018-11-27 | Stop reason: HOSPADM

## 2018-11-27 RX ORDER — ACETAMINOPHEN 325 MG/1
650 TABLET ORAL EVERY 6 HOURS PRN
Status: DISCONTINUED | OUTPATIENT
Start: 2018-11-27 | End: 2018-11-27 | Stop reason: HOSPADM

## 2018-11-27 RX ORDER — OXYCODONE HYDROCHLORIDE 5 MG/1
10 TABLET ORAL
Status: DISCONTINUED | OUTPATIENT
Start: 2018-11-27 | End: 2018-11-27

## 2018-11-27 RX ORDER — POLYETHYLENE GLYCOL 3350 17 G/17G
1 POWDER, FOR SOLUTION ORAL
Status: DISCONTINUED | OUTPATIENT
Start: 2018-11-27 | End: 2018-11-27 | Stop reason: HOSPADM

## 2018-11-27 RX ORDER — PROMETHAZINE HYDROCHLORIDE 25 MG/1
12.5-25 TABLET ORAL EVERY 4 HOURS PRN
Status: DISCONTINUED | OUTPATIENT
Start: 2018-11-27 | End: 2018-11-27 | Stop reason: HOSPADM

## 2018-11-27 RX ORDER — OXYCODONE HYDROCHLORIDE 5 MG/1
5 TABLET ORAL
Status: DISCONTINUED | OUTPATIENT
Start: 2018-11-27 | End: 2018-11-27

## 2018-11-27 RX ORDER — AMOXICILLIN 250 MG
2 CAPSULE ORAL 2 TIMES DAILY
Status: DISCONTINUED | OUTPATIENT
Start: 2018-11-27 | End: 2018-11-27 | Stop reason: HOSPADM

## 2018-11-27 RX ORDER — ONDANSETRON 4 MG/1
4 TABLET, ORALLY DISINTEGRATING ORAL EVERY 4 HOURS PRN
Status: DISCONTINUED | OUTPATIENT
Start: 2018-11-27 | End: 2018-11-27 | Stop reason: HOSPADM

## 2018-11-27 RX ORDER — MORPHINE SULFATE 10 MG/ML
4 INJECTION, SOLUTION INTRAMUSCULAR; INTRAVENOUS
Status: DISCONTINUED | OUTPATIENT
Start: 2018-11-27 | End: 2018-11-27

## 2018-11-27 RX ORDER — BISACODYL 10 MG
10 SUPPOSITORY, RECTAL RECTAL
Status: DISCONTINUED | OUTPATIENT
Start: 2018-11-27 | End: 2018-11-27 | Stop reason: HOSPADM

## 2018-11-27 RX ADMIN — SODIUM CHLORIDE 3 G: 900 INJECTION INTRAVENOUS at 18:07

## 2018-11-27 RX ADMIN — SODIUM CHLORIDE 3 G: 900 INJECTION INTRAVENOUS at 11:50

## 2018-11-27 RX ADMIN — ENOXAPARIN SODIUM 40 MG: 100 INJECTION SUBCUTANEOUS at 15:16

## 2018-11-27 RX ADMIN — VANCOMYCIN HYDROCHLORIDE 1600 MG: 100 INJECTION, POWDER, LYOPHILIZED, FOR SOLUTION INTRAVENOUS at 14:11

## 2018-11-27 ASSESSMENT — PAIN SCALES - GENERAL
PAINLEVEL_OUTOF10: 5
PAINLEVEL_OUTOF10: 8

## 2018-11-27 ASSESSMENT — ENCOUNTER SYMPTOMS
PALPITATIONS: 0
SHORTNESS OF BREATH: 0
HEMOPTYSIS: 0
ABDOMINAL PAIN: 0
COUGH: 0
VOMITING: 0
FEVER: 0
CHILLS: 0
ORTHOPNEA: 0
NAUSEA: 0
DIZZINESS: 0
SPUTUM PRODUCTION: 0
HEADACHES: 0

## 2018-11-27 ASSESSMENT — COPD QUESTIONNAIRES
IN THE PAST 12 MONTHS DO YOU DO LESS THAN YOU USED TO BECAUSE OF YOUR BREATHING PROBLEMS: DISAGREE/UNSURE
HAVE YOU SMOKED AT LEAST 100 CIGARETTES IN YOUR ENTIRE LIFE: YES
DO YOU EVER COUGH UP ANY MUCUS OR PHLEGM?: NO/ONLY WITH OCCASIONAL COLDS OR INFECTIONS
DURING THE PAST 4 WEEKS HOW MUCH DID YOU FEEL SHORT OF BREATH: NONE/LITTLE OF THE TIME
COPD SCREENING SCORE: 2

## 2018-11-27 ASSESSMENT — LIFESTYLE VARIABLES
SUBSTANCE_ABUSE: 1
ALCOHOL_USE: NO

## 2018-11-27 ASSESSMENT — PATIENT HEALTH QUESTIONNAIRE - PHQ9
2. FEELING DOWN, DEPRESSED, IRRITABLE, OR HOPELESS: NOT AT ALL
1. LITTLE INTEREST OR PLEASURE IN DOING THINGS: NOT AT ALL
SUM OF ALL RESPONSES TO PHQ9 QUESTIONS 1 AND 2: 0

## 2018-11-27 NOTE — H&P
Hospital Medicine History & Physical Note    Date of Service  11/27/2018    Primary Care Physician  Pcp Pt States None    Consultants  None    Code Status  Full Code    Chief Complaint  Right foot pain    History of Presenting Illness  28 y.o. female who presented 11/27/2018 with right foot pain.    Ms River has a history of IVDU, bipolar disorder and hepatitis C. I reviewed her medical chart and it is briefly summarized: Patient has a known history of IV drug use and hepatitis C, she was last seen in the emergency room on 8/10/2018 for tooth pain, at that encounter she had a I&D of suspected dental abscess.  I discussed the case with Dr. Ham emergency medicine, we discussed exhausted IV access and plans for central line.    Patient presents the emergency room today with right foot pain.  She says 4-5 days ago she developed a blister the top of her right foot.  The blister opened up with minimal effort, and these initially started draining yellow-green pus.  Since that time her foot has become more swollen and painful, she is having difficulty walking, it feels better when she puts her foot up.  She endorses malaise and chills, no nausea vomiting, no chest pain, no shortness of breath.    Review of Systems  Review of Systems   Constitutional: Positive for malaise/fatigue. Negative for chills.   Respiratory: Negative for cough, hemoptysis and sputum production.    Cardiovascular: Negative for chest pain, palpitations and orthopnea.   Gastrointestinal: Negative for nausea and vomiting.   Skin: Negative for itching and rash.   Neurological: Negative for dizziness.   Psychiatric/Behavioral: Positive for substance abuse.   All other systems reviewed and are negative.      Past Medical History   has a past medical history of Anorexia; Bipolar 2 disorder (HCC); bulemia; Hepatitis C; Impetigo; Major depressive disorder; Mood disorder (HCC); Oppositional defiant disorder; Polysubstance abuse; Post-traumatic stress  disorder; Schizoaffective disorder; and Scoliosis.    Surgical History   has a past surgical history that includes tonsillectomy; pr removal of tonsils,<13 y/o; other; and anthony by laparoscopy (2/1/2013).     Family History  Mother with schizophrenia    Social History   reports that she has been smoking Cigarettes.  She has a 7.00 pack-year smoking history. She has never used smokeless tobacco. She reports that she uses drugs, including Intravenous, Inhaled, Injected (Skin Popping), and Oral. She reports that she does not drink alcohol.    Allergies  Allergies   Allergen Reactions   • Clonazepam      Pt states that she overdosed on it before. Furthermore, per historical, pt had seizure-like activity while taking Clonazepam as a child.   • Gabadone      Unknown reaction.   • Gabapentin      Causes seizures.   • Tiagabine Hydrochloride      Unknown reaction.       Medications  None       Physical Exam  Temp:  [36.6 °C (97.9 °F)] 36.6 °C (97.9 °F)  Pulse:  [98] 98  Resp:  [16] 16  BP: (126)/(81) 126/81    Physical Exam   Constitutional: She is oriented to person, place, and time. She appears well-developed and well-nourished.   HENT:   Head: Normocephalic and atraumatic.   Eyes: Conjunctivae and EOM are normal. Right eye exhibits no discharge. Left eye exhibits no discharge.   Cardiovascular: Normal rate, regular rhythm and intact distal pulses.    No murmur heard.  2+ Radial Pulses  Brisk Capillary Refill   Pulmonary/Chest: Effort normal and breath sounds normal. No respiratory distress. She has no wheezes.   Abdominal: Soft. Bowel sounds are normal. She exhibits no distension. There is no tenderness. There is no rebound.   Musculoskeletal: Normal range of motion. She exhibits no edema.   Neurological: She is alert and oriented to person, place, and time. No cranial nerve deficit.   Skin: Skin is warm and dry. She is not diaphoretic. No erythema.   Skin is warm and well perfused   Psychiatric: She has a normal mood  and affect.       Laboratory:  Recent Labs      11/27/18   1137   WBC  6.4   RBC  4.35   HEMOGLOBIN  12.9   HEMATOCRIT  37.8   MCV  86.9   MCH  29.7   MCHC  34.1   RDW  40.8   PLATELETCT  305   MPV  9.6     Recent Labs      11/27/18   1137   SODIUM  137   POTASSIUM  4.0   CHLORIDE  101   CO2  27   GLUCOSE  69   BUN  10   CREATININE  0.68   CALCIUM  9.6     Recent Labs      11/27/18   1137   ALTSGPT  14   ASTSGOT  29   ALKPHOSPHAT  116*   TBILIRUBIN  0.3   GLUCOSE  69                 No results for input(s): TROPONINI in the last 72 hours.    Urinalysis:    No results found     Imaging:  DX-FOOT-COMPLETE 3+ RIGHT   Final Result      Soft tissue wound on the dorsal foot measuring at least 1.3 cm in cross-section. No acute osseous abnormality. No radiographic evidence of osteomyelitis.      DX-CHEST-PORTABLE (1 VIEW)   Final Result      Well-positioned right IJ central catheter. No pneumothorax.      US-EXTREMITY VENOUS LOWER UNILAT RIGHT   Final Result            Assessment/Plan:  I anticipate this patient will require at least two midnights for appropriate medical management, necessitating inpatient admission.    Cutaneous abscess- (present on admission)   Assessment & Plan    Patient has a large cutaneous abscess at the dorsum of her right foot  Drained at home, no obvious residual fluctuance  X-ray shows no evidence of foreign body  Treat with antibiotics     Cellulitis- (present on admission)   Assessment & Plan    Patient has extensive cellulitis which is circumferential at her right foot, point of entry was likely cutaneous abscess  She is at risk for resistant organisms, certainly MRSA  This is a limb threatening infection  Admit to inpatient status  IV Unasyn and vancomycin  Dose of vancomycin will be titrated to serum concentration levels to ensure adequate levels and reduce risk of toxicity       Hepatitis C antibody test positive- (present on admission)   Assessment & Plan    Outpatient follow-up          VTE prophylaxis: lovenox    Addendum:  I was called by bedside RN that the patient was in the process of leaving AMA and was in the elevator ready to leave.  It was reported to me that the patient had drug paraphernalia that was confiscated and later had a visitor that was asked to leave.  The patient was agitated, asked to have her central line taken out.  When I returned the page, I offered to speak with the patient on the phone but she refused.  I offered to prescribe PO antibiotics for her to take at home and she also refused.  I was not able to evaluate capacity at the time she was leaving but patient demonstrated capacity during my prior encounter today.

## 2018-11-27 NOTE — ED PROVIDER NOTES
"ED Provider Note    ED Provider Note    Primary care provider: Pcp Pt States None  Means of arrival: POV  History obtained from: Patient  History limited by: None    CHIEF COMPLAINT  Chief Complaint   Patient presents with   • Abscess   • Foot Pain       HPI  Luna River is a 28 y.o. female who presents to the Emergency Department with a chief complaint of a \"infection to her foot secondary to a spider bite\".  Patient states that she is allergic to spider bites.  She reports a fever at home of 100.7.  She \"lanced it\" on her own 3 days ago.  Since then she has been applying her own dressing with the help of a friend.  She admits to IV heroin use.  She also has a history of hepatitis C.  Otherwise, denies any medications except to gabapentin.  She denies having any systemic symptoms.  No chest pain or shortness of breath.  No nausea or vomiting.    REVIEW OF SYSTEMS  Review of Systems   Constitutional: Negative for chills and fever.   Respiratory: Negative for cough and shortness of breath.    Cardiovascular: Negative for chest pain.   Gastrointestinal: Negative for abdominal pain, nausea and vomiting.   Skin: Positive for itching and rash.   Neurological: Negative for headaches.   Psychiatric/Behavioral: Positive for substance abuse.   All other systems reviewed and are negative.      PAST MEDICAL HISTORY   has a past medical history of Anorexia; Bipolar 2 disorder (HCC); bulemia; Hepatitis C; Impetigo; Major depressive disorder; Mood disorder (HCC); Oppositional defiant disorder; Polysubstance abuse; Post-traumatic stress disorder; Schizoaffective disorder; and Scoliosis.    SURGICAL HISTORY   has a past surgical history that includes tonsillectomy; removal of tonsils,<15 y/o; other; and anthony by laparoscopy (2/1/2013).    SOCIAL HISTORY  Social History   Substance Use Topics   • Smoking status: Current Every Day Smoker     Packs/day: 1.00     Years: 7.00     Types: Cigarettes   • Smokeless tobacco: Never " "Used      Comment: occas   • Alcohol use No      History   Drug Use   • Types: Intravenous, Inhaled, Injected (Skin Popping), Oral     Comment: heroin, meth       FAMILY HISTORY  No family history on file.    CURRENT MEDICATIONS  Home Medications     Reviewed by Delmy Egan (Pharmacy Tech) on 11/27/18 at 1119  Med List Status: Complete   Medication Last Dose Status        Patient Juan Taking any Medications                       ALLERGIES  Allergies   Allergen Reactions   • Clonazepam      Pt states that she overdosed on it before. Furthermore, per historical, pt had seizure-like activity while taking Clonazepam as a child.   • Gabadone      Unknown reaction.   • Gabapentin      Causes seizures.   • Tiagabine Hydrochloride      Unknown reaction.       PHYSICAL EXAM  VITAL SIGNS: /81   Pulse (!) 103   Temp 36.6 °C (97.9 °F) (Temporal)   Resp 16   Ht 1.676 m (5' 6\")   Wt 63.1 kg (139 lb 1.8 oz)   SpO2 96%   Breastfeeding? No   BMI 22.45 kg/m²   Vitals reviewed.  Constitutional: Patient is oriented to person, place, and time. Appears well-developed and well-nourished. No distress, except with movement of her right lower extremity.    Head: Normocephalic and atraumatic.   Mouth/Throat: Oropharynx is clear and moist  Eyes: Conjunctivae are normal.   Neck: Normal range of motion. Neck supple.  Cardiovascular: Normal rate, regular rhythm and normal heart sounds. Normal peripheral pulses, bilateral upper extremities.  Right pedal pulse, difficult to assess due to location of wound, and overlying swelling.  Pulmonary/Chest: Effort normal and breath sounds normal. No respiratory distress, no wheezes, rhonchi, or rales.   Abdominal: Soft. Bowel sounds are normal. There is no tenderness. No rebound or guarding, or peritoneal signs.   Musculoskeletal: No edema and no tenderness.   Neurological: No focal deficits.   Skin: Skin is warm and dry. No erythema. No pallor.  Patient has diffuse scattered " venipuncture and other various wounds to her bilateral lower and upper extremities in various stages of healing.  Patient's right foot is swollen, erythematous that extends up past the ankle to the distal right lower extremity.  This area is erythematous with increased warmth.  There is a wound approximately 1.5 cm in diameter on the top of the mid foot with purulent drainage.  Psychiatric: Patient has a normal mood and affect.     LABS  Results for orders placed or performed during the hospital encounter of 11/27/18   CBC WITH DIFFERENTIAL   Result Value Ref Range    WBC 6.4 4.8 - 10.8 K/uL    RBC 4.35 4.20 - 5.40 M/uL    Hemoglobin 12.9 12.0 - 16.0 g/dL    Hematocrit 37.8 37.0 - 47.0 %    MCV 86.9 81.4 - 97.8 fL    MCH 29.7 27.0 - 33.0 pg    MCHC 34.1 33.6 - 35.0 g/dL    RDW 40.8 35.9 - 50.0 fL    Platelet Count 305 164 - 446 K/uL    MPV 9.6 9.0 - 12.9 fL    Neutrophils-Polys 58.70 44.00 - 72.00 %    Lymphocytes 34.80 22.00 - 41.00 %    Monocytes 5.30 0.00 - 13.40 %    Eosinophils 0.20 0.00 - 6.90 %    Basophils 0.20 0.00 - 1.80 %    Immature Granulocytes 0.80 0.00 - 0.90 %    Nucleated RBC 0.00 /100 WBC    Neutrophils (Absolute) 3.78 2.00 - 7.15 K/uL    Lymphs (Absolute) 2.24 1.00 - 4.80 K/uL    Monos (Absolute) 0.34 0.00 - 0.85 K/uL    Eos (Absolute) 0.01 0.00 - 0.51 K/uL    Baso (Absolute) 0.01 0.00 - 0.12 K/uL    Immature Granulocytes (abs) 0.05 0.00 - 0.11 K/uL    NRBC (Absolute) 0.00 K/uL   LACTIC ACID   Result Value Ref Range    Lactic Acid 1.0 0.5 - 2.0 mmol/L   COMP METABOLIC PANEL   Result Value Ref Range    Sodium 137 135 - 145 mmol/L    Potassium 4.0 3.6 - 5.5 mmol/L    Chloride 101 96 - 112 mmol/L    Co2 27 20 - 33 mmol/L    Anion Gap 9.0 0.0 - 11.9    Glucose 69 65 - 99 mg/dL    Bun 10 8 - 22 mg/dL    Creatinine 0.68 0.50 - 1.40 mg/dL    Calcium 9.6 8.5 - 10.5 mg/dL    AST(SGOT) 29 12 - 45 U/L    ALT(SGPT) 14 2 - 50 U/L    Alkaline Phosphatase 116 (H) 30 - 99 U/L    Total Bilirubin 0.3 0.1 - 1.5  mg/dL    Albumin 3.9 3.2 - 4.9 g/dL    Total Protein 8.2 6.0 - 8.2 g/dL    Globulin 4.3 (H) 1.9 - 3.5 g/dL    A-G Ratio 0.9 g/dL   WESTERGREN SED RATE   Result Value Ref Range    Sed Rate Westergren 79 (H) 0 - 20 mm/hour   CRP QUANTITIVE (NON-CARDIAC)   Result Value Ref Range    Stat C-Reactive Protein 2.27 (H) 0.00 - 0.75 mg/dL   ESTIMATED GFR   Result Value Ref Range    GFR If African American >60 >60 mL/min/1.73 m 2    GFR If Non African American >60 >60 mL/min/1.73 m 2   GRAM STAIN   Result Value Ref Range    Significant Indicator .     Source WND     Site RIGHT FOOT     Gram Stain Result Few WBCs.  Many Gram positive cocci.          All labs reviewed by me.    RADIOLOGY  DX-FOOT-COMPLETE 3+ RIGHT   Final Result      Soft tissue wound on the dorsal foot measuring at least 1.3 cm in cross-section. No acute osseous abnormality. No radiographic evidence of osteomyelitis.      DX-CHEST-PORTABLE (1 VIEW)   Final Result      Well-positioned right IJ central catheter. No pneumothorax.      US-EXTREMITY VENOUS LOWER UNILAT RIGHT   Final Result        The radiologist's interpretation of all radiological studies have been reviewed by me.    Central Line Placement Procedure  Indication: vascular access and poor peripheral access    Consent: The patient provided verbal consent for this procedure.    Procedure: The patient was positioned appropriately and the skin over the right internal jugular vein was prepped with betadine and draped in a sterile fashion. Local anesthesia was obtained by infiltration using 1% Lidocaine without epinephrine.  A large bore needle was used to identify the vein.  A guide wire was then inserted into the vein through the needle. A triple lumen catheter was then inserted into the vessel over the guide wire using the Seldinger technique.  All ports showed good, free flowing blood return and were flushed with saline solution.  The catheter was then securely fastened to the skin with sutures and  covered with a sterile dressing.  A post procedure X-ray was ordered and showed good line position without pneumothorax.    The patient tolerated the procedure well.    Complications: None      COURSE & MEDICAL DECISION MAKING  Pertinent Labs & Imaging studies reviewed. (See chart for details)    Obtained and reviewed past medical records.  Patient's last encounter, she was seen by myself here in the emergency department in August of this year for a tooth abscess.  Prior to that, patient seen in May of this year for an abscess on arms and legs from IV drug injection.  Patient was seen in November 2017 for a drug overdose.    11:02 AM - Patient seen and examined at bedside.  This is a pleasant 28-year-old female with history of chronic IV drug use who presents with a cellulitis to her right foot.  There is a wound, that is now been opened, and has purulent drainage.  The foot is swollen, erythematous and cellulitic diffusely extending up past the ankle to the distal right lower extremity.  Of advised the patient, I think she is past the point of outpatient therapy with oral antibiotics.  She had a suspicion for this.  Her vital signs are normal now.  However, I do think she is going to need admission for IV antibiotics and she is agreeable.  She will also be evaluated for possible DVT right lower extremity.    11:22 AM report from ultrasound tech.  No evidence of right lower extremity DVT.    11:49 AM nurse was able to successfully, is starting IV.  Bloods drawn, blood cultures obtained.  I spoken with the hospitalist.  Awaiting labs.  DVT study negative.  However, patient will need admission to the hospital for lower extremity cellulitis.  This in the setting of chronic IV drug use.  I have spoken with the hospitalist, Dr. Hackett, who agrees to admit the patient to their service.    12:28 PM shortly after initial IV started labs drawn.  I was notified by the nurse, that it seems to be infiltrated.  Due to very poor  vascular access including attempt at EJ's.  Patient does not have an IV.  Placement for access and administration of antibiotics.  Please see procedure note above.  Patient was very much agreeable, curious and amenable to this plan of care.    FINAL IMPRESSION  1. Cellulitis of right lower extremity    2. IV drug abuse (HCC)    Central line placement by ERP.

## 2018-11-27 NOTE — PROGRESS NOTES
"Report received from ER RN. Patient arrived to unit. Pt A&OX4, able to specify needs. Pt with draining abscess to right foot, dressing in place, photos uploaded, and wound consult placed. Pt gave consent for this RN to go through belongings after stating she had been caught smoking in a hospital bathroom on a previous admit. This RN proceeded to go through belongings, removing cigarettes, lighters and syringes filled with a black substance. At this point patient became very verbally aggressive and agitated stating \" I wish I wouldn't have given you consent, now I'm never going to see those again\". This RN explained that it was my job to ensure her safety while in the hospital. Pt continued to be upset stating \"just take the whole fucking bag, I don't care!\" Security was called and came up to the unit. Merle with security and another guard came and confiscated the bag and stated they would call [employer] to take possession of the drugs and return the bag once cleared. Merle with security returned with the bag about 10 minutes later. Cigarettes, lighters, and a little pocket knife were put into a bag and placed in the units safekeeping box with a patient sticker attached. Bag was returned to patient by this RN.  "

## 2018-11-27 NOTE — ED TRIAGE NOTES
"Pt c/o foot \"abscess\" pt states she's \"allergic to spider bites\" pt hx of IV and skin pumping heroin and meth. Pt with home dressing on and purulent drainage noted.   "

## 2018-11-28 NOTE — PROGRESS NOTES
"Pharmacy Kinetics 28 y.o. female on vancomycin day # 1 2018    Indication for Treatment: R foot abscess with purulent drainage    Pertinent history per medical record: Admitted on 2018 for R foot abscess. Abscess was drained at home. Pt has a h/o skin pumping with heroine and meth.     Other antibiotics: Unasyn 3g q6h    Allergies: Clonazepam; Gabadone; Gabapentin; and Tiagabine hydrochloride     List concerns for renal function: none    Pertinent cultures to date:   Results     Procedure Component Value Units Date/Time    BLOOD CULTURE [[id number]] Collected:  18 1137    Order Status:  Completed Specimen:  Blood from Peripheral Updated:  18 1231    Narrative:       2 of 2 blood culture x2  Sites order. Per Hospital Policy:  Only change Specimen Src: to \"Line\" if specified by physician  order.    BLOOD CULTURE [[id number]] Collected:  18 111    Order Status:  Completed Specimen:  Wound from Peripheral Updated:  18 1225    Narrative:       1 of 2 for Blood Culture x 2 sites order. Per Hospital  Policy: Only change Specimen Src: to \"Line\" if specified by  physician order.    CULTURE WOUND W/ GRAM STAIN [[id number]] Collected:  18 111    Order Status:  Completed Specimen:  Wound from Right Foot Updated:  18 1118          Recent Labs      18   1137   WBC  6.4   NEUTSPOLYS  58.70     Recent Labs      18   1137   BUN  10   CREATININE  0.68   ALBUMIN  3.9     Blood pressure 126/81, pulse (!) 103, temperature 36.6 °C (97.9 °F), temperature source Temporal, resp. rate 16, height 1.676 m (5' 6\"), weight 63.1 kg (139 lb 1.8 oz), SpO2 96 %, not currently breastfeeding. Temp (24hrs), Av.6 °C (97.9 °F), Min:36.6 °C (97.9 °F), Max:36.6 °C (97.9 °F)      A/P   1. Vancomycin dose change: 1600 mg x 1 then 900 mg IV q8h   2. Next vancomycin level:  0600 before 3rd dose (2nd maintenance dose)  3. Goal trough: 12-16 mcg/mL  4. Comments: Pending wound and blood cultures. " F/u and adjust abx according to results.     Beth Jamil, PharmD

## 2018-11-28 NOTE — PROGRESS NOTES
Luna River patient has chosen to leave the hospital against medical advice. The attending physician has not discharged the patient. Patient is not a risk to himself or others. I have discussed with the patient the following:  Physician has not determined patient is ready for discharge, Risks and consequences of leaving the hospital too soon and Benefit of continued hospitalization.      Discharge against medical advice form has been Patient left abruptly - no chance to sign.      Attending physician has been notified.

## 2018-11-28 NOTE — CARE PLAN
Problem: Safety  Goal: Will remain free from falls    Intervention: Implement fall precautions  Bed alarm in use, bed in lowest and locked position, non-skid socks in place, call light in reach, hourly rounding in place.      Problem: Knowledge Deficit  Goal: Knowledge of disease process/condition, treatment plan, diagnostic tests, and medications will improve    Intervention: Explain information regarding disease process/condition, treatment plan, diagnostic tests, and medications and document in education  Patient educated about no drug/no smoking policy in the hospital.

## 2018-11-28 NOTE — PROGRESS NOTES
"This RN notified by pts roommate of possible suspicious behavior by patient and boyfriend. Upon entering the room, pt states \"no one called we don;t need anything.\" Pt appeared to be hiding items in her purse. Charge RN notified. Charge RN let pts boyfriend know that we are not allowing visitors for pts safety. Drug paraphernalia confiscated from pt on previous shift. Pt remained in the bathroom yelling at staff to get out. Security notified. Pt verbalizing that she wants to leave AMA but will not allow staff to remove her central line until she receives her belongings from the unit lock box. PT states \"I hope I lose my foot.\" Pt educated by this RN and charge RN of importance of treating her infection with antibiotics. Pt refused teaching. Possessions with security. Charge RN removed central line with assistance from security. Dr. Webster notified by this RN. Pt refused prescription for oral antibiotics and refused to speak to MD. Pt left unit with her boyfriend and all possessions.       "

## 2018-11-28 NOTE — PROGRESS NOTES
· 2 RN skin check complete.   · Devices in place central line, dressing to right foot.  · Skin assessed under devices yes.  · Confirmed pressure ulcers found on n/a.  · New potential pressure ulcers noted on right elbow. Wound consult   · The following interventions in place patient educated to change position at least every two hours to prevent skin breakdown.    Draining abscess to right foot, dressing CDI. Scab to right elbow.

## 2018-11-29 LAB
BACTERIA WND AEROBE CULT: ABNORMAL
BACTERIA WND AEROBE CULT: ABNORMAL
GRAM STN SPEC: ABNORMAL
SIGNIFICANT IND 70042: ABNORMAL
SITE SITE: ABNORMAL
SOURCE SOURCE: ABNORMAL

## 2018-12-02 LAB
BACTERIA BLD CULT: NORMAL
BACTERIA BLD CULT: NORMAL
SIGNIFICANT IND 70042: NORMAL
SIGNIFICANT IND 70042: NORMAL
SITE SITE: NORMAL
SITE SITE: NORMAL
SOURCE SOURCE: NORMAL
SOURCE SOURCE: NORMAL

## 2018-12-16 ENCOUNTER — HOSPITAL ENCOUNTER (EMERGENCY)
Dept: HOSPITAL 8 - ED | Age: 28
LOS: 1 days | Discharge: HOME | End: 2018-12-17
Payer: SELF-PAY

## 2018-12-16 VITALS — SYSTOLIC BLOOD PRESSURE: 138 MMHG | DIASTOLIC BLOOD PRESSURE: 93 MMHG

## 2018-12-16 VITALS — BODY MASS INDEX: 19.73 KG/M2 | HEIGHT: 70 IN | WEIGHT: 137.79 LBS

## 2018-12-16 DIAGNOSIS — L02.415: ICD-10-CM

## 2018-12-16 DIAGNOSIS — F17.210: ICD-10-CM

## 2018-12-16 DIAGNOSIS — L03.115: Primary | ICD-10-CM

## 2018-12-16 LAB
ALBUMIN SERPL-MCNC: 3.8 G/DL (ref 3.4–5)
ALP SERPL-CCNC: 138 U/L (ref 45–117)
ALT SERPL-CCNC: 20 U/L (ref 12–78)
ANION GAP SERPL CALC-SCNC: 9 MMOL/L (ref 5–15)
BASOPHILS # BLD AUTO: 0.03 X10^3/UL (ref 0–0.1)
BASOPHILS NFR BLD AUTO: 0 % (ref 0–1)
BILIRUB SERPL-MCNC: 0.8 MG/DL (ref 0.2–1)
CALCIUM SERPL-MCNC: 8.8 MG/DL (ref 8.5–10.1)
CHLORIDE SERPL-SCNC: 103 MMOL/L (ref 98–107)
CREAT SERPL-MCNC: 0.87 MG/DL (ref 0.55–1.02)
CRP SERPL-MCNC: 6.2 MG/DL (ref 0.02–0.49)
EOSINOPHIL # BLD AUTO: 0 X10^3/UL (ref 0–0.4)
EOSINOPHIL NFR BLD AUTO: 0 % (ref 1–7)
ERYTHROCYTE [DISTWIDTH] IN BLOOD BY AUTOMATED COUNT: 13.7 % (ref 9.6–15.2)
ERYTHROCYTE [SEDIMENTATION RATE] IN BLOOD BY WESTERGREN METHOD: 45 MM/HR (ref 0–20)
HCT (SEDRATE): 41 % (ref 34.6–47.8)
LYMPHOCYTES # BLD AUTO: 1.82 X10^3/UL (ref 1–3.4)
LYMPHOCYTES NFR BLD AUTO: 18 % (ref 22–44)
MCH RBC QN AUTO: 30 PG (ref 27–34.8)
MCHC RBC AUTO-ENTMCNC: 34.3 G/DL (ref 32.4–35.8)
MCV RBC AUTO: 87.5 FL (ref 80–100)
MD: NO
MONOCYTES # BLD AUTO: 0.6 X10^3/UL (ref 0.2–0.8)
MONOCYTES NFR BLD AUTO: 6 % (ref 2–9)
NEUTROPHILS # BLD AUTO: 7.51 X10^3/UL (ref 1.8–6.8)
NEUTROPHILS NFR BLD AUTO: 76 % (ref 42–75)
PLATELET # BLD AUTO: 238 X10^3/UL (ref 130–400)
PMV BLD AUTO: 7.6 FL (ref 7.4–10.4)
PROT SERPL-MCNC: 8.6 G/DL (ref 6.4–8.2)
RBC # BLD AUTO: 4.75 X10^6/UL (ref 3.82–5.3)

## 2018-12-16 PROCEDURE — 86140 C-REACTIVE PROTEIN: CPT

## 2018-12-16 PROCEDURE — 10060 I&D ABSCESS SIMPLE/SINGLE: CPT

## 2018-12-16 PROCEDURE — 90471 IMMUNIZATION ADMIN: CPT

## 2018-12-16 PROCEDURE — 80053 COMPREHEN METABOLIC PANEL: CPT

## 2018-12-16 PROCEDURE — 85025 COMPLETE CBC W/AUTO DIFF WBC: CPT

## 2018-12-16 PROCEDURE — 99284 EMERGENCY DEPT VISIT MOD MDM: CPT

## 2018-12-16 PROCEDURE — 36415 COLL VENOUS BLD VENIPUNCTURE: CPT

## 2018-12-16 PROCEDURE — 93005 ELECTROCARDIOGRAM TRACING: CPT

## 2018-12-16 PROCEDURE — 73630 X-RAY EXAM OF FOOT: CPT

## 2018-12-16 PROCEDURE — 85651 RBC SED RATE NONAUTOMATED: CPT

## 2018-12-16 PROCEDURE — 90715 TDAP VACCINE 7 YRS/> IM: CPT

## 2019-03-17 ENCOUNTER — HOSPITAL ENCOUNTER (EMERGENCY)
Dept: HOSPITAL 8 - ED | Age: 29
Discharge: HOME | End: 2019-03-17
Payer: SELF-PAY

## 2019-03-17 VITALS — WEIGHT: 132.28 LBS | HEIGHT: 70 IN | BODY MASS INDEX: 18.94 KG/M2

## 2019-03-17 VITALS — DIASTOLIC BLOOD PRESSURE: 67 MMHG | SYSTOLIC BLOOD PRESSURE: 113 MMHG

## 2019-03-17 DIAGNOSIS — F15.20: ICD-10-CM

## 2019-03-17 DIAGNOSIS — L02.415: Primary | ICD-10-CM

## 2019-03-17 DIAGNOSIS — Z86.19: ICD-10-CM

## 2019-03-17 DIAGNOSIS — Z72.9: ICD-10-CM

## 2019-03-17 PROCEDURE — 10060 I&D ABSCESS SIMPLE/SINGLE: CPT

## 2019-03-17 PROCEDURE — 99285 EMERGENCY DEPT VISIT HI MDM: CPT

## 2019-03-17 PROCEDURE — 99152 MOD SED SAME PHYS/QHP 5/>YRS: CPT

## 2019-03-17 PROCEDURE — 96372 THER/PROPH/DIAG INJ SC/IM: CPT

## 2019-03-19 ENCOUNTER — HOSPITAL ENCOUNTER (EMERGENCY)
Dept: HOSPITAL 8 - ED | Age: 29
Discharge: HOME | End: 2019-03-19
Payer: SELF-PAY

## 2019-03-19 VITALS — WEIGHT: 139.99 LBS | BODY MASS INDEX: 20.04 KG/M2 | HEIGHT: 70 IN

## 2019-03-19 VITALS — DIASTOLIC BLOOD PRESSURE: 71 MMHG | SYSTOLIC BLOOD PRESSURE: 109 MMHG

## 2019-03-19 DIAGNOSIS — Z48.01: Primary | ICD-10-CM

## 2019-03-19 DIAGNOSIS — Z90.49: ICD-10-CM

## 2019-03-19 PROCEDURE — 96372 THER/PROPH/DIAG INJ SC/IM: CPT

## 2019-03-19 PROCEDURE — 99283 EMERGENCY DEPT VISIT LOW MDM: CPT

## 2019-03-23 ENCOUNTER — HOSPITAL ENCOUNTER (EMERGENCY)
Dept: HOSPITAL 8 - ED | Age: 29
Discharge: HOME | End: 2019-03-23
Payer: SELF-PAY

## 2019-03-23 VITALS — WEIGHT: 136.91 LBS | HEIGHT: 70 IN | BODY MASS INDEX: 19.6 KG/M2

## 2019-03-23 VITALS — DIASTOLIC BLOOD PRESSURE: 76 MMHG | SYSTOLIC BLOOD PRESSURE: 110 MMHG

## 2019-03-23 DIAGNOSIS — L02.415: Primary | ICD-10-CM

## 2019-03-23 PROCEDURE — 99283 EMERGENCY DEPT VISIT LOW MDM: CPT

## 2019-04-23 ENCOUNTER — HOSPITAL ENCOUNTER (EMERGENCY)
Facility: MEDICAL CENTER | Age: 29
End: 2019-04-23
Attending: EMERGENCY MEDICINE

## 2019-04-23 VITALS
TEMPERATURE: 98.7 F | HEART RATE: 76 BPM | BODY MASS INDEX: 18.97 KG/M2 | WEIGHT: 132.5 LBS | SYSTOLIC BLOOD PRESSURE: 118 MMHG | OXYGEN SATURATION: 98 % | HEIGHT: 70 IN | RESPIRATION RATE: 18 BRPM | DIASTOLIC BLOOD PRESSURE: 74 MMHG

## 2019-04-23 DIAGNOSIS — L98.499: ICD-10-CM

## 2019-04-23 PROCEDURE — 99283 EMERGENCY DEPT VISIT LOW MDM: CPT

## 2019-04-23 RX ORDER — SULFAMETHOXAZOLE AND TRIMETHOPRIM 800; 160 MG/1; MG/1
1 TABLET ORAL 2 TIMES DAILY
Qty: 10 TAB | Refills: 0 | Status: SHIPPED | OUTPATIENT
Start: 2019-04-23 | End: 2019-04-28

## 2019-04-23 NOTE — ED TRIAGE NOTES
Chief Complaint   Patient presents with   • Wound Check     right hand and left wrist x2wks     Pt ambulated to triage, here for wounds on palm of her hands from injecting drugs.Per pt, wound right hand drains pus once in a while. History of MRSA

## 2019-04-23 NOTE — ED PROVIDER NOTES
ED Provider Note    Scribed for Otto Islas M.D. by Paige Carmona. 4/23/2019  8:54 AM    Primary care provider: Pcp Pt States None  Means of arrival: walk-in  History obtained from: patient  History limited by: none    CHIEF COMPLAINT  Chief Complaint   Patient presents with   • Wound Check     right hand and left wrist x2wks       HPI  Luna River is a 29 y.o. female who presents to the Emergency Department for evaluation of several scabbed over wounds located diffusely across both of her hands, most significantly wounds located on the right palm and left wrist. Patient is an IV heroin user, and injects into her palms, wrists and between her fingers. She has a recurrent history of abscesses and MRSA, and reports that if she develops abscesses along her hands, she will incise and drain them herself. At this time she reports some mild tenderness over the several scabbed areas along her hands, reporting additional mild drainage. She does pick regularly at them and states any wounds she develops secondary to injecting take a long time to heal. Patient is trying to ween off the IV drug use. She has access to subaxone, and voices adamancy on discontinued her drug use, just that it is taking her quite a bit of time. No reports of any fever.    REVIEW OF SYSTEMS  Pertinent positives include tenderness and mild drainage along multiple scabbed over areas along the hands. Pertinent negatives include no fever.     PAST MEDICAL HISTORY   has a past medical history of Anorexia; Bipolar 2 disorder (HCC); bulemia; Hepatitis C; Hepatitis C; Impetigo; Major depressive disorder; Mood disorder (HCC); MRSA infection; Oppositional defiant disorder; Polysubstance abuse (HCC); Post-traumatic stress disorder; Schizoaffective disorder; and Scoliosis.    SURGICAL HISTORY   has a past surgical history that includes tonsillectomy; removal of tonsils,<15 y/o; other; and anthony by laparoscopy (2/1/2013).    SOCIAL HISTORY  Social  "History   Substance Use Topics   • Smoking status: Current Every Day Smoker     Packs/day: 1.00     Years: 7.00     Types: Cigarettes   • Smokeless tobacco: Never Used      Comment: occas   • Alcohol use No      History   Drug Use   • Types: Intravenous, Inhaled, Injected (Skin Popping), Oral     Comment: heroin, meth       FAMILY HISTORY  History reviewed. No pertinent family history.    CURRENT MEDICATIONS  Home Medications     Reviewed by Kelly Nichols R.N. (Registered Nurse) on 04/23/19 at 0830  Med List Status: Complete   Medication Last Dose Status        Patient Juan Taking any Medications                       ALLERGIES  Allergies   Allergen Reactions   • Clonazepam      Pt states that she overdosed on it before. Furthermore, per historical, pt had seizure-like activity while taking Clonazepam as a child.   • Gabadone      Unknown reaction.   • Gabapentin      Causes seizures.   • Tiagabine Hydrochloride      Unknown reaction.       PHYSICAL EXAM  VITAL SIGNS: /77   Pulse 88   Temp 37.1 °C (98.7 °F) (Temporal)   Resp 18   Ht 1.778 m (5' 10\")   Wt 60.1 kg (132 lb 7.9 oz)   SpO2 98%   BMI 19.01 kg/m²     Constitutional: Well developed, Well nourished, mild distress, Non-toxic appearance.   HENT: Normocephalic, Atraumatic, Bilateral external ears normal, Oropharynx moist, No oral exudates.   Eyes: PERRLA, EOMI, Conjunctiva normal, No discharge.   Neck: No tenderness, Supple, No stridor.   Lymphatic: No lymphadenopathy noted.   Cardiovascular: Normal heart rate, Normal rhythm.   Thorax & Lungs: Clear to auscultation bilaterally, No respiratory distress, No wheezing, No crackles.   Abdomen: Soft, No tenderness, No masses, No pulsatile masses.   Skin: Warm, Dry, multiple track marks and ecchymosis throughout her body, on the palms, multiple areas of track marks, scarring, on the right rhoades thenar area, there is a 1cm ulcer with surrounding scar tissue, no purulent drainage, slightly tender, " small ulcer left wrist, no erythema no draining.  Extremities:, No edema No cyanosis.   Musculoskeletal: No major deformities noted.  Intact distal pulses  Neurologic: Awake, alert. Moves all extremities spontaneously.  Psychiatric: anxious and tremulous    COURSE & MEDICAL DECISION MAKING  Pertinent Labs & Imaging studies reviewed. (See chart for details)    8:54 AM - Patient seen and examined at bedside. She presents afebrile, with a history of MRSA and recurrent abscess secondary to IV drug use, for evaluation of several wounds long her extremities, most significantly two wounds, one on her right palm, one on her left wrist. I informed the patient that at this time, there are no concerning indications for infection such as obvious draining or spreading redness. I advised her to keep the wounds covered to prevent her from picking at them, and to keep them as clean as possible. I did explain I would provide a prescription for antibiotics for her to fill should her symptoms worsen. Patient understands and agrees with treatment plan and discharge.    Decision Making:  Patient with a history of IV drug use, has some ulcers on her hands from injecting, I do not see any evidence of cellulitis however due to the patient's history we will give the patient a prescription for Bactrim in case they get increasing red, pain, swelling, have the patient return with any other concerns.    The patient will return for new or worsening symptoms and is stable at the time of discharge.    DISPOSITION:  Patient will be discharged home in stable condition.    FOLLOW UP:  [facility], Emergency Dept  [address] [facility] [id number] [phone]    If symptoms worsen      OUTPATIENT MEDICATIONS:  Discharge Medication List as of 4/23/2019  9:21 AM      START taking these medications    Details   sulfamethoxazole-trimethoprim (BACTRIM DS) 800-160 MG tablet Take 1 Tab by mouth 2 times a day for 5 days., Disp-10  Tab, R-0, Normal               FINAL IMPRESSION  1. Hand ulceration, with unspecified severity (HCC)          I, Paige Carmona (Scribe), am scribing for, and in the presence of, Otto Islas M.D..    Electronically signed by: Paige Carmona (Scribe), 4/23/2019    IOtto M.D. personally performed the services described in this documentation, as scribed by Paige Carmona in my presence, and it is both accurate and complete.    The note accurately reflects work and decisions made by me.  Otto Islas  4/23/2019  3:37 PM

## 2019-04-23 NOTE — ED NOTES
Pt presents with 2 wounds to her R palm from injecting drugs.  1 has been draining green pus and is covered with green eschar now.  Also 1 wound to her L palm.  Fingers are generally swollen.

## 2019-05-02 ENCOUNTER — HOSPITAL ENCOUNTER (EMERGENCY)
Facility: MEDICAL CENTER | Age: 29
End: 2019-05-02
Attending: EMERGENCY MEDICINE

## 2019-05-02 VITALS
DIASTOLIC BLOOD PRESSURE: 72 MMHG | BODY MASS INDEX: 18.43 KG/M2 | SYSTOLIC BLOOD PRESSURE: 109 MMHG | HEIGHT: 70 IN | OXYGEN SATURATION: 98 % | RESPIRATION RATE: 18 BRPM | WEIGHT: 128.75 LBS | TEMPERATURE: 96.7 F | HEART RATE: 68 BPM

## 2019-05-02 DIAGNOSIS — L03.116 CELLULITIS OF LEFT LOWER EXTREMITY: ICD-10-CM

## 2019-05-02 LAB
ANION GAP SERPL CALC-SCNC: 4 MMOL/L (ref 0–11.9)
BASOPHILS # BLD AUTO: 0 % (ref 0–1.8)
BASOPHILS # BLD: 0 K/UL (ref 0–0.12)
BUN SERPL-MCNC: 10 MG/DL (ref 8–22)
CALCIUM SERPL-MCNC: 9.4 MG/DL (ref 8.5–10.5)
CHLORIDE SERPL-SCNC: 103 MMOL/L (ref 96–112)
CO2 SERPL-SCNC: 29 MMOL/L (ref 20–33)
CREAT SERPL-MCNC: 0.7 MG/DL (ref 0.5–1.4)
EOSINOPHIL # BLD AUTO: 0 K/UL (ref 0–0.51)
EOSINOPHIL NFR BLD: 0 % (ref 0–6.9)
ERYTHROCYTE [DISTWIDTH] IN BLOOD BY AUTOMATED COUNT: 43.9 FL (ref 35.9–50)
GLUCOSE SERPL-MCNC: 77 MG/DL (ref 65–99)
HCT VFR BLD AUTO: 41.9 % (ref 37–47)
HGB BLD-MCNC: 14.3 G/DL (ref 12–16)
IMM GRANULOCYTES # BLD AUTO: 0.01 K/UL (ref 0–0.11)
IMM GRANULOCYTES NFR BLD AUTO: 0.2 % (ref 0–0.9)
LYMPHOCYTES # BLD AUTO: 2.19 K/UL (ref 1–4.8)
LYMPHOCYTES NFR BLD: 38.1 % (ref 22–41)
MCH RBC QN AUTO: 29.4 PG (ref 27–33)
MCHC RBC AUTO-ENTMCNC: 34.1 G/DL (ref 33.6–35)
MCV RBC AUTO: 86 FL (ref 81.4–97.8)
MONOCYTES # BLD AUTO: 0.43 K/UL (ref 0–0.85)
MONOCYTES NFR BLD AUTO: 7.5 % (ref 0–13.4)
NEUTROPHILS # BLD AUTO: 3.12 K/UL (ref 2–7.15)
NEUTROPHILS NFR BLD: 54.2 % (ref 44–72)
NRBC # BLD AUTO: 0 K/UL
NRBC BLD-RTO: 0 /100 WBC
PLATELET # BLD AUTO: 189 K/UL (ref 164–446)
PMV BLD AUTO: 10.1 FL (ref 9–12.9)
POTASSIUM SERPL-SCNC: 4.3 MMOL/L (ref 3.6–5.5)
RBC # BLD AUTO: 4.87 M/UL (ref 4.2–5.4)
SODIUM SERPL-SCNC: 136 MMOL/L (ref 135–145)
WBC # BLD AUTO: 5.8 K/UL (ref 4.8–10.8)

## 2019-05-02 PROCEDURE — A9270 NON-COVERED ITEM OR SERVICE: HCPCS | Performed by: EMERGENCY MEDICINE

## 2019-05-02 PROCEDURE — 85025 COMPLETE CBC W/AUTO DIFF WBC: CPT

## 2019-05-02 PROCEDURE — 36415 COLL VENOUS BLD VENIPUNCTURE: CPT

## 2019-05-02 PROCEDURE — 80048 BASIC METABOLIC PNL TOTAL CA: CPT

## 2019-05-02 PROCEDURE — 99284 EMERGENCY DEPT VISIT MOD MDM: CPT

## 2019-05-02 PROCEDURE — 700102 HCHG RX REV CODE 250 W/ 637 OVERRIDE(OP): Performed by: EMERGENCY MEDICINE

## 2019-05-02 RX ORDER — IBUPROFEN 600 MG/1
600 TABLET ORAL ONCE
Status: COMPLETED | OUTPATIENT
Start: 2019-05-02 | End: 2019-05-02

## 2019-05-02 RX ORDER — HYDROCODONE BITARTRATE AND ACETAMINOPHEN 10; 325 MG/1; MG/1
1 TABLET ORAL ONCE
Status: COMPLETED | OUTPATIENT
Start: 2019-05-02 | End: 2019-05-02

## 2019-05-02 RX ORDER — CEPHALEXIN 500 MG/1
500 CAPSULE ORAL 3 TIMES DAILY
Qty: 21 CAP | Refills: 0 | Status: SHIPPED | OUTPATIENT
Start: 2019-05-02 | End: 2019-05-09

## 2019-05-02 RX ORDER — SULFAMETHOXAZOLE AND TRIMETHOPRIM 800; 160 MG/1; MG/1
1 TABLET ORAL 2 TIMES DAILY
Qty: 14 TAB | Refills: 0 | Status: SHIPPED | OUTPATIENT
Start: 2019-05-02 | End: 2019-05-09

## 2019-05-02 RX ADMIN — HYDROCODONE BITARTRATE AND ACETAMINOPHEN 1 TABLET: 10; 325 TABLET ORAL at 22:08

## 2019-05-02 RX ADMIN — IBUPROFEN 600 MG: 600 TABLET ORAL at 22:08

## 2019-05-03 NOTE — ED NOTES
Chief Complaint   Patient presents with   • Wound Infection     left shin     Reports that she's had an abscess on her Left shin for ~ 1 week. States that she does have a Hx of MRSA.

## 2019-05-03 NOTE — ED PROVIDER NOTES
ED Provider Note    Scribed for Pedro Rollins M.D. by Ahmet Santos. 5/2/2019  9:47 PM    CHIEF COMPLAINT  Chief Complaint   Patient presents with   • Wound Infection     left shin       HPI  Luna River is a 29 y.o. Female with history of hypertension, MRSA, Hep C, and IV drug use who presents to the ED complaining of left shin pain with associated swelling and erythema onset approximately one week ago. Patient reports that the infection has been ongoing for some time, but hoped it would dissipate with time. She presented to the ED today due to the infection and pain worsening significantly. Patient endorses associated symptom of subjective fever. She denies antibiotics use or any other major medical problems. Prior to infection developing, she notes she injected into the affected area. Patient has no history of cardiac disease or disorders. She denies knee pain. Patient reports no alleviating or modifying factors.    REVIEW OF SYSTEMS  Constitutional: Positive for subjective fever.  Skin: Positive for left shin swelling and erythema.  MSK: Positive for left shin pain. Negative for knee pain.  See HPI for further details. All other systems are negative.     PAST MEDICAL HISTORY   has a past medical history of Anorexia; Bipolar 2 disorder (HCC); bulemia; Hepatitis C; Hepatitis C; Impetigo; Major depressive disorder; Mood disorder (HCC); MRSA infection; Oppositional defiant disorder; Polysubstance abuse (HCC); Post-traumatic stress disorder; Schizoaffective disorder; and Scoliosis.    SOCIAL HISTORY  Social History     Social History Main Topics   • Smoking status: Current Every Day Smoker     Packs/day: 0.50     Years: 7.00     Types: Cigarettes   • Smokeless tobacco: Never Used      Comment: occas   • Alcohol use No   • Drug use: Yes     Types: Intravenous, Injected (Skin Popping)      Comment: heroin   • Sexual activity: None noted.  "      SURGICAL HISTORY   has a past surgical history that includes tonsillectomy; removal of tonsils,<15 y/o; other; and anthony by laparoscopy (2/1/2013).    CURRENT MEDICATIONS  No current facility-administered medications for this encounter.     Current Outpatient Prescriptions:   •  sulfamethoxazole-trimethoprim (BACTRIM DS) 800-160 MG tablet, Take 1 Tab by mouth 2 times a day for 7 days., Disp: 14 Tab, Rfl: 0  •  cephALEXin (KEFLEX) 500 MG Cap, Take 1 Cap by mouth 3 times a day for 7 days., Disp: 21 Cap, Rfl: 0    ALLERGIES  Allergies   Allergen Reactions   • Clonazepam      Pt states that she overdosed on it before. Furthermore, per historical, pt had seizure-like activity while taking Clonazepam as a child.   • Gabadone      Unknown reaction.   • Gabapentin      Causes seizures.   • Tiagabine Hydrochloride      Unknown reaction.       PHYSICAL EXAM  VITAL SIGNS: /85   Pulse (!) 110   Temp 35.9 °C (96.7 °F) (Temporal)   Resp 18   Ht 1.778 m (5' 10\")   Wt 58.4 kg (128 lb 12 oz)   SpO2 98%   BMI 18.47 kg/m²  @DANIA[[id number]::@   Pulse ox interpretation: I interpret this pulse ox as normal.  Genl: Anxious appearing. F sitting in chair comfortably, speaking clearly, appears mildly distressed.  Head: NC/AT  ENT: Mucous membranes moist, posterior pharynx clear, uvula midline, nares patent bilaterally   Eyes: Normal sclera, pupils equal round reactive to light  Neck: Supple, FROM, no LAD appreciated   Pulmonary: Lungs are clear to auscultation bilaterally  Chest: No TTP  CV: Borderline tachycardic. Regular rhythm, no murmur appreciated, pulses 2+ in both upper and lower extremities,  Abdomen: soft, NT/ND; no rebound/guarding, no masses palpated, no HSM   Musculoskeletal: Pain free ROM of the neck. Moving upper and lower extremities and spontaneous in coordinated fashion  Neuro: A&Ox4 (person, place, time, situation), speech fluent, gait steady, no focal deficits appreciated, No cerebellar signs. Sensation is " grossly intact in the distal upper and lower extremities  5/5 strength in  and dorsiflexion/plantar flexion of the ankles  Psych: Patient has an appropriate affect and behavior  Skin: 6 x 7 cm area of erythema with some central clearing to left shin without fluctuation or large mass, no active drainage. Multiple scattered healing areas of scar tissues to palms and forearms bilaterally. No pallor or jaundice.  No cyanosis.  Warm and dry.     DIAGNOSTIC STUDIES / PROCEDURES    LABS  Results for orders placed or performed during the hospital encounter of 05/02/19   CBC WITH DIFFERENTIAL   Result Value Ref Range    WBC 5.8 4.8 - 10.8 K/uL    RBC 4.87 4.20 - 5.40 M/uL    Hemoglobin 14.3 12.0 - 16.0 g/dL    Hematocrit 41.9 37.0 - 47.0 %    MCV 86.0 81.4 - 97.8 fL    MCH 29.4 27.0 - 33.0 pg    MCHC 34.1 33.6 - 35.0 g/dL    RDW 43.9 35.9 - 50.0 fL    Platelet Count 189 164 - 446 K/uL    MPV 10.1 9.0 - 12.9 fL    Neutrophils-Polys 54.20 44.00 - 72.00 %    Lymphocytes 38.10 22.00 - 41.00 %    Monocytes 7.50 0.00 - 13.40 %    Eosinophils 0.00 0.00 - 6.90 %    Basophils 0.00 0.00 - 1.80 %    Immature Granulocytes 0.20 0.00 - 0.90 %    Nucleated RBC 0.00 /100 WBC    Neutrophils (Absolute) 3.12 2.00 - 7.15 K/uL    Lymphs (Absolute) 2.19 1.00 - 4.80 K/uL    Monos (Absolute) 0.43 0.00 - 0.85 K/uL    Eos (Absolute) 0.00 0.00 - 0.51 K/uL    Baso (Absolute) 0.00 0.00 - 0.12 K/uL    Immature Granulocytes (abs) 0.01 0.00 - 0.11 K/uL    NRBC (Absolute) 0.00 K/uL   BMP   Result Value Ref Range    Sodium 136 135 - 145 mmol/L    Potassium 4.3 3.6 - 5.5 mmol/L    Chloride 103 96 - 112 mmol/L    Co2 29 20 - 33 mmol/L    Glucose 77 65 - 99 mg/dL    Bun 10 8 - 22 mg/dL    Creatinine 0.70 0.50 - 1.40 mg/dL    Calcium 9.4 8.5 - 10.5 mg/dL    Anion Gap 4.0 0.0 - 11.9   ESTIMATED GFR   Result Value Ref Range    GFR If African American >60 >60 mL/min/1.73 m 2    GFR If Non African American >60 >60 mL/min/1.73 m 2     COURSE & MEDICAL DECISION  MAKING  Pertinent Labs & Imaging studies reviewed. (See chart for details)    Differential diagnoses include but not limited to: Abscess, cellulitis, sepsis, secondary wound infection, drug use     9:47 PM - Patient seen and examined at bedside. Patient will be treated with Motrin 600 mg and Norco 325 mg for her symptoms. Ordered Estimated GFR, CBC w/ Diff, and BMP to evaluate her symptoms. Patient was informed there was no obvious abscess. She will be discharged home with prescriptions for Bactrim and Keflex. Patient is understanding and agreeable with plan.       Medical Decision Making:   Seen and evaluated for the symptoms as described above.  She has an area of erythema consistent with cellulitis and a central clearing that is phlegmon cyst but without a definitive abscess fluid collection.  She is tachycardic on initial evaluation though any acute pain.  IV access is established and basic labs and electrolytes are obtained due to her high risk factors including hep C and IV drug use.  She has no appreciable murmurs, no acute respiratory distress, doubt disseminated septicemia or endocarditis based on his clinical findings.  She has no gross metabolic derangements, no endorgan dysfunction, no leukocytosis.  The soft tissue findings are isolated without involvement in the joints or long bones.  I recommend the patient initiate antibiosis with Bactrim and Keflex with repeat wound check here in the emergency department in 2 to 3 days and reestablishing with her primary care physician.  Patient verbalized understanding the working diagnosis drugs and the need for reevaluation by medical professional described timeframe.  Questions are addressed and she is discharged home in stable condition    The patient will return for new or worsening symptoms and is stable at the time of discharge.    DISPOSITION:  Patient will be discharged home in stable condition.    FOLLOW UP:  [facility], Emergency  Dept  [address] [place] [id number]  [phone]  In 3 days  For wound re-check    Your PCP    In 1 week  For wound re-check      OUTPATIENT MEDICATIONS:  New Prescriptions    CEPHALEXIN (KEFLEX) 500 MG CAP    Take 1 Cap by mouth 3 times a day for 7 days.    SULFAMETHOXAZOLE-TRIMETHOPRIM (BACTRIM DS) 800-160 MG TABLET    Take 1 Tab by mouth 2 times a day for 7 days.       FINAL IMPRESSION  Visit Diagnoses     ICD-10-CM   1. Cellulitis of left lower extremity L03.116       Ahmet BOONE (Danyell), am scribing for, and in the presence of, Pedro Rollins M.D..    Electronically signed by: Ahmet Santos (Danyell), 5/2/2019    Pedro BOONE M.D. personally performed the services described in this documentation, as scribed by Ahmet Santos in my presence, and it is both accurate and complete.    The note accurately reflects work and decisions made by me.  Pedro Rollins  5/3/2019  1:43 AM

## 2019-05-03 NOTE — ED TRIAGE NOTES
Pt ambulatory to triage c/o redness/ swelling to left shin x 1 week. Pt reports injecting heroin into LLE approx 1 week ago. Redness/ swelling noted, no open wound or drainage. , other VSS. Educated on triage process, encouraged to inform staff of any changes.

## 2019-05-09 ENCOUNTER — HOSPITAL ENCOUNTER (EMERGENCY)
Facility: MEDICAL CENTER | Age: 29
End: 2019-05-09
Attending: EMERGENCY MEDICINE

## 2019-05-09 ENCOUNTER — APPOINTMENT (OUTPATIENT)
Dept: RADIOLOGY | Facility: MEDICAL CENTER | Age: 29
End: 2019-05-09
Attending: EMERGENCY MEDICINE

## 2019-05-09 VITALS
HEIGHT: 70 IN | WEIGHT: 128 LBS | TEMPERATURE: 98.2 F | HEART RATE: 99 BPM | BODY MASS INDEX: 18.32 KG/M2 | SYSTOLIC BLOOD PRESSURE: 112 MMHG | OXYGEN SATURATION: 98 % | DIASTOLIC BLOOD PRESSURE: 73 MMHG | RESPIRATION RATE: 14 BRPM

## 2019-05-09 DIAGNOSIS — L03.116 CELLULITIS OF LEFT LOWER EXTREMITY: ICD-10-CM

## 2019-05-09 PROCEDURE — 73590 X-RAY EXAM OF LOWER LEG: CPT | Mod: LT

## 2019-05-09 PROCEDURE — A9270 NON-COVERED ITEM OR SERVICE: HCPCS | Performed by: EMERGENCY MEDICINE

## 2019-05-09 PROCEDURE — 700102 HCHG RX REV CODE 250 W/ 637 OVERRIDE(OP): Performed by: EMERGENCY MEDICINE

## 2019-05-09 PROCEDURE — 99284 EMERGENCY DEPT VISIT MOD MDM: CPT

## 2019-05-09 RX ORDER — CEPHALEXIN 500 MG/1
500 CAPSULE ORAL 3 TIMES DAILY
Qty: 21 CAP | Refills: 0 | Status: SHIPPED | OUTPATIENT
Start: 2019-05-09 | End: 2019-05-16

## 2019-05-09 RX ORDER — SULFAMETHOXAZOLE AND TRIMETHOPRIM 800; 160 MG/1; MG/1
1 TABLET ORAL ONCE
Status: COMPLETED | OUTPATIENT
Start: 2019-05-09 | End: 2019-05-09

## 2019-05-09 RX ORDER — SULFAMETHOXAZOLE AND TRIMETHOPRIM 800; 160 MG/1; MG/1
1 TABLET ORAL 2 TIMES DAILY
Qty: 14 TAB | Refills: 0 | Status: SHIPPED | OUTPATIENT
Start: 2019-05-09 | End: 2019-05-16

## 2019-05-09 RX ORDER — ACETAMINOPHEN 325 MG/1
650 TABLET ORAL ONCE
Status: COMPLETED | OUTPATIENT
Start: 2019-05-09 | End: 2019-05-09

## 2019-05-09 RX ORDER — CEPHALEXIN 500 MG/1
500 CAPSULE ORAL ONCE
Status: COMPLETED | OUTPATIENT
Start: 2019-05-09 | End: 2019-05-09

## 2019-05-09 RX ADMIN — SULFAMETHOXAZOLE AND TRIMETHOPRIM 1 TABLET: 800; 160 TABLET ORAL at 03:55

## 2019-05-09 RX ADMIN — ACETAMINOPHEN 650 MG: 325 TABLET, FILM COATED ORAL at 03:55

## 2019-05-09 RX ADMIN — CEPHALEXIN 500 MG: 500 CAPSULE ORAL at 03:55

## 2019-05-09 NOTE — DISCHARGE PLANNING
Pt in need of assistance with antibiotics to avoid hospitalization.    LEW faxed an approved service for 15.79 to pay for Keflex and Bactrim.    Pt was advised by evening SW to pick medication up at the pharmacy after 1200 today. LEW attempted to call Pt [phone] to notify her the medications would be ready but phone number on face sheet is disconnected.

## 2019-05-09 NOTE — ED TRIAGE NOTES
"Luna River   29 y.o. female   Chief Complaint   Patient presents with   • Abscess     left shin, was seen here on the second for this, was told to come back for re-eval. pt says she tried to drain it on her own with a needle.       Pt amb to triage with steady gait for above complaint. left shin is swollen and red, did not take the ABX that was prescribed because she says she could not afford it. Able to walk on that leg without pain.     Pt is alert and oriented, speaking in full sentences, follows commands and responds appropriately to questions. NAD. Resp are even and unlabored.   Pt placed in lobby. Pt educated on triage process. Pt encouraged to alert staff for any changes.    /76   Pulse (!) 105   Temp 36.7 °C (98.1 °F) (Temporal)   Resp 16   Ht 1.778 m (5' 10\")   Wt 58.1 kg (128 lb)   SpO2 98%   BMI 18.37 kg/m²     "

## 2019-05-09 NOTE — ED PROVIDER NOTES
ED Provider Note    ED Provider Note    Primary care provider: Pcp Pt States None  Means of arrival: Private vehicle  History obtained from: Patient  History limited by: None    CHIEF COMPLAINT  Chief Complaint   Patient presents with   • Abscess     left shin, was seen here on the second for this, was told to come back for re-eval. pt says she tried to drain it on her own with a needle.        HPI  Luna River is a 29 y.o. female who presents to the Emergency Department w/ PMHx MRSA, Hep C, and IV drug use who presents to the ED complaining of left shin pain with associated swelling and erythema onset approximately one week ago. Patient reports that the infection has been ongoing for some time, but hoped it would dissipate with time.   Pt states that she was seen in ED 2 d prior, but was unable to afford antibiotics.  She states she tried her own I&D with needle with no relief.    She presented to the ED today due to the infection and pain persisting. Patient endorses associated symptom of subjective fever. She denies antibiotics use or any other major medical problems. Prior to infection developing, she notes she injected into the affected area. Patient has no history of cardiac disease or disorders. She denies knee pain. Patient reports no alleviating or modifying factors.    REVIEW OF SYSTEMS  Pertinent negatives include no fever.  No chest pain or SOB.    PAST MEDICAL HISTORY   has a past medical history of Anorexia; Bipolar 2 disorder (HCC); bulemia; Hepatitis C; Hepatitis C; Impetigo; Major depressive disorder; Mood disorder (HCC); MRSA infection; Oppositional defiant disorder; Polysubstance abuse (HCC); Post-traumatic stress disorder; Schizoaffective disorder; and Scoliosis.    SURGICAL HISTORY   has a past surgical history that includes tonsillectomy; removal of tonsils,<15 y/o; other; and anthony by laparoscopy (2/1/2013).    SOCIAL HISTORY  Social History   Substance Use Topics   • Smoking status:  "Current Every Day Smoker     Packs/day: 0.50     Years: 7.00     Types: Cigarettes   • Smokeless tobacco: Never Used      Comment: occas   • Alcohol use No      History   Drug Use   • Types: Intravenous, Injected (Skin Popping)     Comment: heroin       FAMILY HISTORY  No family history on file.    CURRENT MEDICATIONS  Home Medications    **Home medications have not yet been reviewed for this encounter**         ALLERGIES  Allergies   Allergen Reactions   • Clonazepam      Pt states that she overdosed on it before. Furthermore, per historical, pt had seizure-like activity while taking Clonazepam as a child.   • Gabadone      Unknown reaction.   • Gabapentin      Causes seizures.   • Tiagabine Hydrochloride      Unknown reaction.       PHYSICAL EXAM    VITAL SIGNS: /73   Pulse 99   Temp 36.8 °C (98.2 °F) (Temporal)   Resp 14   Ht 1.778 m (5' 10\")   Wt 58.1 kg (128 lb)   SpO2 98%   BMI 18.37 kg/m²  @DANIA[[id number]::@  Pulse ox interpretation: I interpret this pulse ox as normal.  Constitutional: Alert in no apparent distress.  HENT: Normocephalic, Atraumatic, Bilateral external ears normal. Nose normal.   Eyes: Pupils are equal and reactive. Conjunctiva normal, non-icteric.   Heart: Regular rate and rythm, no murmurs.    Lungs: Clear to auscultation bilaterally.  Skin: Warm, Dry, No erythema, No rash.   6 x 7 cm area of erythema with some central clearing to left shin without fluctuation or large mass, no active drainage. Multiple scattered healing areas of scar tissues to palms and forearms bilaterally. No pallor or jaundice.  No cyanosis.  Warm and dry.     2+ peripheral pulses.     Neurologic: Alert, Grossly non-focal.   Psychiatric: Affect normal, Judgment normal, Mood normal, Appears appropriate and not intoxicated.     LABS  Labs Reviewed - No data to display   All labs reviewed by me.    RADIOLOGY  DX-TIBIA AND FIBULA LEFT    (Results Pending)     The radiologist's interpretation of all radiological " studies have been reviewed by me.    COURSE & MEDICAL DECISION MAKING  Nursing notes, VS, PMSFHx reviewed in chart.    3:47 AM Patient seen and examined at bedside.     29 y.o. female p/w CC of L shin pain and redness    Differential diagnosis includes is not limited to:  Hx and PE c/w cellulitis  Pt w/ no crepitus or tissue necrosis to suggest concern for Necrotizing fascitis at this time   No retained foreign body seen on XR.  PT left prior to official XR interpretation.  Exam not c/w absccess at this time, no area of flutuance on exam or obvious fluid collection    Bedside ultrasound performed by me with no obvious evidence of posterior acoustic enhancement or area of abscess at this time.  Mild cobblestoning seen on point-of-care ultrasound.  Images unable to be saved at this time.    Given patient's prior noncompliance I offered her hospital admission at this time however she wishes to decline.  Given her inability to fill her prescriptions in outpatient setting and occult social work he was able to provide patient with medications for free.  I provided patient with first dose of antibiotics in the emergency department along with Tylenol for pain.       DISPOSITION:  Patient will be discharged home in stable condition.    FINAL IMPRESSION  1. Cellulitis of left lower extremity         Electronically signed by: Imtiaz Coronado, 5/9/2019 3:47 AM

## 2019-05-30 ENCOUNTER — HOSPITAL ENCOUNTER (EMERGENCY)
Facility: MEDICAL CENTER | Age: 29
End: 2019-05-30
Attending: EMERGENCY MEDICINE

## 2019-05-30 ENCOUNTER — HOSPITAL ENCOUNTER (EMERGENCY)
Dept: HOSPITAL 8 - ED | Age: 29
Discharge: HOME | End: 2019-05-30
Payer: SELF-PAY

## 2019-05-30 VITALS
HEART RATE: 78 BPM | RESPIRATION RATE: 18 BRPM | TEMPERATURE: 98.1 F | SYSTOLIC BLOOD PRESSURE: 113 MMHG | HEIGHT: 71 IN | BODY MASS INDEX: 17.92 KG/M2 | OXYGEN SATURATION: 99 % | WEIGHT: 128 LBS | DIASTOLIC BLOOD PRESSURE: 78 MMHG

## 2019-05-30 VITALS — SYSTOLIC BLOOD PRESSURE: 127 MMHG | DIASTOLIC BLOOD PRESSURE: 80 MMHG

## 2019-05-30 VITALS — HEIGHT: 70 IN | WEIGHT: 121.25 LBS | BODY MASS INDEX: 17.36 KG/M2

## 2019-05-30 DIAGNOSIS — Z90.49: ICD-10-CM

## 2019-05-30 DIAGNOSIS — Z86.19: ICD-10-CM

## 2019-05-30 DIAGNOSIS — L02.91 ABSCESS: ICD-10-CM

## 2019-05-30 DIAGNOSIS — L03.116: Primary | ICD-10-CM

## 2019-05-30 DIAGNOSIS — L03.116 CELLULITIS OF LEFT LOWER EXTREMITY: ICD-10-CM

## 2019-05-30 DIAGNOSIS — L02.416: ICD-10-CM

## 2019-05-30 DIAGNOSIS — F17.200: ICD-10-CM

## 2019-05-30 DIAGNOSIS — F11.10 HEROIN ABUSE (HCC): ICD-10-CM

## 2019-05-30 PROCEDURE — 87070 CULTURE OTHR SPECIMN AEROBIC: CPT

## 2019-05-30 PROCEDURE — 99285 EMERGENCY DEPT VISIT HI MDM: CPT

## 2019-05-30 PROCEDURE — 303977 HCHG I & D

## 2019-05-30 PROCEDURE — 87205 SMEAR GRAM STAIN: CPT

## 2019-05-30 PROCEDURE — 700111 HCHG RX REV CODE 636 W/ 250 OVERRIDE (IP): Performed by: EMERGENCY MEDICINE

## 2019-05-30 PROCEDURE — 90471 IMMUNIZATION ADMIN: CPT

## 2019-05-30 PROCEDURE — 99284 EMERGENCY DEPT VISIT MOD MDM: CPT

## 2019-05-30 PROCEDURE — A9270 NON-COVERED ITEM OR SERVICE: HCPCS | Performed by: EMERGENCY MEDICINE

## 2019-05-30 PROCEDURE — 700102 HCHG RX REV CODE 250 W/ 637 OVERRIDE(OP): Performed by: EMERGENCY MEDICINE

## 2019-05-30 PROCEDURE — 90715 TDAP VACCINE 7 YRS/> IM: CPT | Performed by: EMERGENCY MEDICINE

## 2019-05-30 PROCEDURE — 303485 HCHG DRESSING MEDIUM

## 2019-05-30 PROCEDURE — 700101 HCHG RX REV CODE 250

## 2019-05-30 RX ORDER — LIDOCAINE HYDROCHLORIDE AND EPINEPHRINE BITARTRATE 20; .01 MG/ML; MG/ML
20 INJECTION, SOLUTION SUBCUTANEOUS ONCE
Status: COMPLETED | OUTPATIENT
Start: 2019-05-30 | End: 2019-05-30

## 2019-05-30 RX ORDER — SULFAMETHOXAZOLE AND TRIMETHOPRIM 800; 160 MG/1; MG/1
1 TABLET ORAL 2 TIMES DAILY
Qty: 20 TAB | Refills: 0 | Status: SHIPPED | OUTPATIENT
Start: 2019-05-30 | End: 2019-06-09

## 2019-05-30 RX ORDER — AMOXICILLIN AND CLAVULANATE POTASSIUM 875; 125 MG/1; MG/1
1 TABLET, FILM COATED ORAL 2 TIMES DAILY
Qty: 20 TAB | Refills: 0 | Status: SHIPPED | OUTPATIENT
Start: 2019-05-30 | End: 2019-06-09

## 2019-05-30 RX ORDER — SULFAMETHOXAZOLE AND TRIMETHOPRIM 800; 160 MG/1; MG/1
1 TABLET ORAL ONCE
Status: COMPLETED | OUTPATIENT
Start: 2019-05-30 | End: 2019-05-30

## 2019-05-30 RX ORDER — AMOXICILLIN AND CLAVULANATE POTASSIUM 500; 125 MG/1; MG/1
1 TABLET, FILM COATED ORAL ONCE
Status: DISCONTINUED | OUTPATIENT
Start: 2019-05-30 | End: 2019-05-30

## 2019-05-30 RX ORDER — LIDOCAINE HYDROCHLORIDE AND EPINEPHRINE BITARTRATE 20; .01 MG/ML; MG/ML
INJECTION, SOLUTION SUBCUTANEOUS
Status: COMPLETED
Start: 2019-05-30 | End: 2019-05-30

## 2019-05-30 RX ORDER — AMOXICILLIN AND CLAVULANATE POTASSIUM 875; 125 MG/1; MG/1
1 TABLET, FILM COATED ORAL ONCE
Status: COMPLETED | OUTPATIENT
Start: 2019-05-30 | End: 2019-05-30

## 2019-05-30 RX ADMIN — CLOSTRIDIUM TETANI TOXOID ANTIGEN (FORMALDEHYDE INACTIVATED), CORYNEBACTERIUM DIPHTHERIAE TOXOID ANTIGEN (FORMALDEHYDE INACTIVATED), BORDETELLA PERTUSSIS TOXOID ANTIGEN (GLUTARALDEHYDE INACTIVATED), BORDETELLA PERTUSSIS FILAMENTOUS HEMAGGLUTININ ANTIGEN (FORMALDEHYDE INACTIVATED), BORDETELLA PERTUSSIS PERTACTIN ANTIGEN, AND BORDETELLA PERTUSSIS FIMBRIAE 2/3 ANTIGEN 0.5 ML: 5; 2; 2.5; 5; 3; 5 INJECTION, SUSPENSION INTRAMUSCULAR at 21:19

## 2019-05-30 RX ADMIN — AMOXICILLIN AND CLAVULANATE POTASSIUM 1 TABLET: 875; 125 TABLET, FILM COATED ORAL at 21:20

## 2019-05-30 RX ADMIN — SULFAMETHOXAZOLE AND TRIMETHOPRIM 1 TABLET: 800; 160 TABLET ORAL at 21:20

## 2019-05-30 RX ADMIN — LIDOCAINE HYDROCHLORIDE AND EPINEPHRINE BITARTRATE 20 ML: 20; .01 INJECTION, SOLUTION SUBCUTANEOUS at 20:55

## 2019-05-30 RX ADMIN — LIDOCAINE HYDROCHLORIDE AND EPINEPHRINE 20 ML: 20; 10 INJECTION, SOLUTION INFILTRATION; PERINEURAL at 20:55

## 2019-05-31 LAB
GRAM STN SPEC: NORMAL
SIGNIFICANT IND 70042: NORMAL
SITE SITE: NORMAL
SOURCE SOURCE: NORMAL

## 2019-05-31 NOTE — ED NOTES
Patient informed by  the last visit she was given an assistance . Patient discharge with prescriptions and instruction. Verbalized understanding. Crutches provided.

## 2019-05-31 NOTE — ED TRIAGE NOTES
"Luna River   29 y.o. female   Chief Complaint   Patient presents with   • Abscess     right lower leg, was at [facility] today but says they were rude and would not knock her out to drain her abscess.       Pt to triage in  for above complaint. Pt came in by EMS for this complaint, was able to walk from ambulance to triage though now states she cannot put any pressure on that leg. Left lower leg slightly swollen.     Pt is alert and oriented, speaking in full sentences, follows commands and responds appropriately to questions. NAD. Resp are even and unlabored.   Pt placed in lobby. Pt educated on triage process. Pt encouraged to alert staff for any changes.    /79   Pulse 73   Temp 36.3 °C (97.3 °F) (Temporal)   Resp 18   Ht 1.791 m (5' 10.5\")   Wt 58.1 kg (128 lb)   SpO2 99%   BMI 18.11 kg/m²     "

## 2019-05-31 NOTE — ED PROVIDER NOTES
ED Provider Note    Scribed for Corey Almanza M.D. by Iza Mullen. 5/30/2019, 8:51 PM.    Primary care provider: Pcp Pt States None  Means of arrival: Walk-in  History obtained from: Patient  History limited by: None    CHIEF COMPLAINT  Chief Complaint   Patient presents with   • Abscess     right lower leg, was at [facility] today but says they were rude and would not knock her out to drain her abscess.        HPI  Luan River is a 29 y.o. female who presents to the Emergency Department with an abscess to her left lower extremity onset approximately one week ago. Patient reportedly lanced the abscess and let it drain. She endorses associated pain, redness, and swelling to the area. No alleviating factors identified.     Per Nursing note, the patient was seen earlier today at [facility] but left due to disagreement with staff.     Patient has a history of IV heroin abuse, and recently used yesterday. She is unsure if she is up to date with tetanus vaccine.     REVIEW OF SYSTEMS  Review of Systems   Skin:        Positive for pain, redness, and swelling to the area.        PAST MEDICAL HISTORY   has a past medical history of Anorexia; Bipolar 2 disorder (HCC); bulemia; Hepatitis C; Hepatitis C; Impetigo; Major depressive disorder; Mood disorder (HCC); MRSA infection; Oppositional defiant disorder; Polysubstance abuse (HCC); Post-traumatic stress disorder; Schizoaffective disorder; and Scoliosis.    SURGICAL HISTORY   has a past surgical history that includes tonsillectomy; removal of tonsils,<13 y/o; other; and anthony by laparoscopy (2/1/2013).    SOCIAL HISTORY  Social History   Substance Use Topics   • Smoking status: Current Every Day Smoker     Packs/day: 0.50     Years: 7.00     Types: Cigarettes   • Smokeless tobacco: Never Used      Comment: occas   • Alcohol use No      History   Drug Use   • Types: Intravenous, Injected (Skin Popping)     Comment: heroin       FAMILY HISTORY  No family  "history on file.    CURRENT MEDICATIONS  Home Medications     Reviewed by Alexander Singh R.N. (Registered Nurse) on 05/30/19 at 2034  Med List Status: <None>   Medication Last Dose Status        Patient Juan Taking any Medications                       ALLERGIES  Allergies   Allergen Reactions   • Clonazepam      Pt states that she overdosed on it before. Furthermore, per historical, pt had seizure-like activity while taking Clonazepam as a child.   • Gabadone      Unknown reaction.   • Gabapentin      Causes seizures.   • Tiagabine Hydrochloride      Unknown reaction.       PHYSICAL EXAM  VITAL SIGNS: /79   Pulse 73   Temp 36.3 °C (97.3 °F) (Temporal)   Resp 18   Ht 1.791 m (5' 10.5\")   Wt 58.1 kg (128 lb)   SpO2 99%   BMI 18.11 kg/m²     Constitutional: Well developed, Well nourished.  HENT: Normocephalic, Atraumatic, Bilateral external ears normal.  Skin: Warm, Dry, Left lateral aspect of leg has some injection sites, abscess is 2 cm. There is cellulitis that extends down lateral aspect of left leg and into lateral aspect of ankle.   Musculoskeletal: Good range of motion in all major joints. No major deformities noted. Intact distal pulses, no clubbing, no cyanosis  Neurologic: Alert & oriented x 3, Normal motor function, Normal sensory function, No focal deficits noted.   Psychiatric: Mildly anxious but otherwise appropriate      Incision and Drainage Procedure Note    Indication: Abscess    Procedure: The patient was positioned appropriately and the skin over the incision site was prepped with alcohol. Local anesthesia was obtained by infiltration using 1% Lidocaine with epinephrine.  An incision was then made over the apex of the lesion and approximately 3 cc of purulent material was expressed. Loculations were not present. The drainage cavity was then packed with sterile gauze. The patient’s tetanus status was updated with a tetanus booster.    The patient tolerated the procedure " well.    Complications: None      COURSE & MEDICAL DECISION MAKING  Nursing notes, VS, PMSFHx reviewed in chart.    8:51 PM - Patient seen and examined at bedside. Patient will be treated with Adacel injection 0.5 ml, Augmentin, and Bactrim. Ordered for culture wound with Gram stain. I discussed the risks and benefits of the incision and drainage procedure. She verbalizes her consent and agrees to plan of care.     9:02 PM - Performed incision and drainage procedure as above.     9:06 PM - She will be discharged home plan as outlined below. Patient is understanding and agreeable to discharge.     Decision Making:  The patient presents with a 3 cm abscess to the lateral aspect of her left lower leg with a 10 cm by 4 cm area of surrounding cellulitis onset one week ago. Performed incision and drainage procedure as above. Sterile dressing was applied. The patient is instructed to keep the area clean and dry. The patient was told to return in 2 days to take the packing out and for wound check. The patient was told to return if there were increasing signs of infection, such as increasing redness or pain, nausea, vomiting or worsening fever. I also informed the patient to return earlier if area extends two inches beyond areas I marked. Patient shows reluctance in picking up medication, therefore I have contacted social work to consult. The patient will return for new or worsening symptoms and is stable at the time of discharge.    DISPOSITION:  Patient will be discharged home in stable condition.    FOLLOW UP:  [facility], Emergency Dept  [address] [facility] [address] [phone]  In 2 days  for wound recheck, Return if any symptoms worsen      OUTPATIENT MEDICATIONS:  New Prescriptions    AMOXICILLIN-CLAVULANATE (AUGMENTIN) 875-125 MG TAB    Take 1 Tab by mouth 2 times a day for 10 days.    SULFAMETHOXAZOLE-TRIMETHOPRIM (BACTRIM DS) 800-160 MG TABLET    Take 1 Tab by mouth 2 Times a Day  for 10 days.         FINAL IMPRESSION  1. Abscess    2. Cellulitis of left lower extremity    3. Heroin abuse (HCC)          I, Iza Mullen (Scrjuan fe), am scribing for, and in the presence of, Corey Almanza M.D..    Electronically signed by: Iza Mullen (Danyell), 5/30/2019    ICorey M.D. personally performed the services described in this documentation, as scribed by Iza Mullen in my presence, and it is both accurate and complete. E.     The note accurately reflects work and decisions made by me.  Corey Almanza  5/30/2019  9:54 PM

## 2019-05-31 NOTE — DISCHARGE PLANNING
Medical Social Work     SW received a call from the RN requesting possible medication assistance for the pt. SW reviewed the pt chart and she has been given assistance for medication on 5/9/19. LEW paid for the pt medications.     LEW advised the ERP and RN that SW will not provided assistance as she was provided medication assistance recently. LEW met with the pt at bedside and advised her that we can no longer provided her assistance because we had already provided her with medication assistance and it is a one time assistance we provide. The pt stated she remembers SW helping her but she never picked up her medications and maybe she would try to pick them up. Sw advised her that we cannot help her with medications. The pt verbalized understanding.     Plan: SW will remain available.

## 2019-06-01 LAB
BACTERIA WND AEROBE CULT: NORMAL
GRAM STN SPEC: NORMAL
SIGNIFICANT IND 70042: NORMAL
SITE SITE: NORMAL
SOURCE SOURCE: NORMAL

## 2019-06-16 ENCOUNTER — HOSPITAL ENCOUNTER (EMERGENCY)
Facility: MEDICAL CENTER | Age: 29
End: 2019-06-16
Attending: EMERGENCY MEDICINE

## 2019-06-16 VITALS
HEART RATE: 94 BPM | SYSTOLIC BLOOD PRESSURE: 116 MMHG | WEIGHT: 114.64 LBS | TEMPERATURE: 96.9 F | BODY MASS INDEX: 16.41 KG/M2 | OXYGEN SATURATION: 98 % | HEIGHT: 70 IN | RESPIRATION RATE: 18 BRPM | DIASTOLIC BLOOD PRESSURE: 72 MMHG

## 2019-06-16 DIAGNOSIS — L02.91 ABSCESS: ICD-10-CM

## 2019-06-16 PROCEDURE — 303977 HCHG I & D

## 2019-06-16 PROCEDURE — A9270 NON-COVERED ITEM OR SERVICE: HCPCS | Performed by: EMERGENCY MEDICINE

## 2019-06-16 PROCEDURE — 99284 EMERGENCY DEPT VISIT MOD MDM: CPT

## 2019-06-16 PROCEDURE — 303485 HCHG DRESSING MEDIUM

## 2019-06-16 PROCEDURE — 96372 THER/PROPH/DIAG INJ SC/IM: CPT

## 2019-06-16 PROCEDURE — 700101 HCHG RX REV CODE 250: Performed by: EMERGENCY MEDICINE

## 2019-06-16 PROCEDURE — 700102 HCHG RX REV CODE 250 W/ 637 OVERRIDE(OP): Performed by: EMERGENCY MEDICINE

## 2019-06-16 PROCEDURE — 700111 HCHG RX REV CODE 636 W/ 250 OVERRIDE (IP): Performed by: EMERGENCY MEDICINE

## 2019-06-16 RX ORDER — OXYCODONE AND ACETAMINOPHEN 10; 325 MG/1; MG/1
1 TABLET ORAL ONCE
Status: COMPLETED | OUTPATIENT
Start: 2019-06-16 | End: 2019-06-16

## 2019-06-16 RX ORDER — CEFTRIAXONE 2 G/1
1000 INJECTION, POWDER, FOR SOLUTION INTRAMUSCULAR; INTRAVENOUS ONCE
Status: COMPLETED | OUTPATIENT
Start: 2019-06-16 | End: 2019-06-16

## 2019-06-16 RX ORDER — LIDOCAINE HYDROCHLORIDE AND EPINEPHRINE 10; 10 MG/ML; UG/ML
10 INJECTION, SOLUTION INFILTRATION; PERINEURAL ONCE
Status: DISCONTINUED | OUTPATIENT
Start: 2019-06-16 | End: 2019-06-16 | Stop reason: HOSPADM

## 2019-06-16 RX ORDER — LIDOCAINE HYDROCHLORIDE 10 MG/ML
20 INJECTION, SOLUTION INFILTRATION; PERINEURAL ONCE
Status: COMPLETED | OUTPATIENT
Start: 2019-06-16 | End: 2019-06-16

## 2019-06-16 RX ADMIN — LIDOCAINE HYDROCHLORIDE 20 ML: 10 INJECTION, SOLUTION INFILTRATION; PERINEURAL at 04:02

## 2019-06-16 RX ADMIN — CEFTRIAXONE SODIUM 1 G: 2 INJECTION, POWDER, FOR SOLUTION INTRAMUSCULAR; INTRAVENOUS at 04:26

## 2019-06-16 RX ADMIN — OXYCODONE HYDROCHLORIDE AND ACETAMINOPHEN 1 TABLET: 10; 325 TABLET ORAL at 04:02

## 2019-06-16 RX ADMIN — MUPIROCIN 1 APPLICATION: 20 OINTMENT TOPICAL at 05:34

## 2019-06-16 NOTE — ED NOTES
All lines, monitors DC'd. Discharge instructions given . Pt given time to ask any questions. Pt ambulatory out of department. All pt belongings in pt's possession. Pt verbalized understanding.

## 2019-06-16 NOTE — ED TRIAGE NOTES
"Luna River  29 y.o. female    Chief Complaint   Patient presents with   • Wound Check     x3 weeks \"I have some infections on my legs, I was taking antibiotics but I don't know if I finished them because people took my stuff.\" Pt admits to drug abuse, last used opiates yesterday. Brusing and redness noted to BLE. Open puncture wound to left calf with drainage noted.        Pt amb to triage with steady gait for above complaint. HX MRSA.     Pt is alert and oriented, speaking in full sentences, follows commands and responds appropriately to questions. Resp are even and unlabored. No behavioral indicators of pain.   Pt placed in lobby. Pt educated on triage process. Pt encouraged to alert staff for any changes.    "

## 2019-06-16 NOTE — ED PROVIDER NOTES
"ED Provider Note    CHIEF COMPLAINT  Chief Complaint   Patient presents with   • Wound Check     x3 weeks \"I have some infections on my legs, I was taking antibiotics but I don't know if I finished them because people took my stuff.\" Pt admits to drug abuse, last used opiates yesterday. Brusing and redness noted to BLE. Open puncture wound to left calf with drainage noted.      Seen at 3:28 AM    HPI  Luna River is a 29 y.o. female who presents with multiple wounds in the lower legs.  The patient does have a history of IV heroin use.  She does occasionally skin pop but is not sure if the wounds are resulting from skin popping.  She denies any fevers or chills.  She is unable to get antibiotics filled due to insufficient funds.    REVIEW OF SYSTEMS  See HPI  PAST MEDICAL HISTORY   has a past medical history of Anorexia; Bipolar 2 disorder (HCC); bulemia; Hepatitis C; Hepatitis C; Impetigo; Major depressive disorder; Mood disorder (HCC); MRSA infection; Oppositional defiant disorder; Polysubstance abuse (HCC); Post-traumatic stress disorder; Schizoaffective disorder; and Scoliosis.    SOCIAL HISTORY  Social History     Social History Main Topics   • Smoking status: Current Every Day Smoker     Packs/day: 0.50     Years: 7.00     Types: Cigarettes   • Smokeless tobacco: Never Used      Comment: occas   • Alcohol use No   • Drug use: Yes     Types: Intravenous, Injected (Skin Popping)      Comment: heroin   • Sexual activity: Not on file       SURGICAL HISTORY   has a past surgical history that includes tonsillectomy; removal of tonsils,<15 y/o; other; and anthony by laparoscopy (2/1/2013).    CURRENT MEDICATIONS  Reviewed.  See Encounter Summary.     ALLERGIES  Allergies   Allergen Reactions   • Clonazepam      Pt states that she overdosed on it before. Furthermore, per historical, pt had seizure-like activity while taking Clonazepam as a child.   • Gabadone      Unknown reaction.   • Gabapentin      Causes " "seizures.   • Tiagabine Hydrochloride      Unknown reaction.       PHYSICAL EXAM  VITAL SIGNS: /72   Pulse 94   Temp 36.1 °C (96.9 °F) (Temporal)   Resp 18   Ht 1.778 m (5' 10\")   Wt 52 kg (114 lb 10.2 oz)   SpO2 98%   BMI 16.45 kg/m²   Constitutional: Awake, alert in no apparent distress.  HENT: Normocephalic, Bilateral external ears normal. Nose normal.   Eyes: Conjunctiva normal, non-icteric, EOMI.    Thorax & Lungs: Easy unlabored respirations  Cardiovascular:    Abdomen:  No distention  Skin: Multiple open wounds, areas of erythema, nodules, old abscesses on the bilateral lower extremities.  There are 2 significant ones.  There is a 2 cm area of induration on the right proximal thigh that shows a large hypoechoic region on ultrasound, there is a 1 cm lesion on the right medial thigh that does not have any hypoechoic region seen on ultrasound.  Extremities:   atraumatic   Neurologic: Alert, Grossly non-focal.   Psychiatric: Affect and Mood normal    RADIOLOGY  No orders to display        Nursing notes and vital signs were reviewed. (See chart for details)    Medical record reviewed, the patient has been here 3 times in May for wound check and abscesses of the lower extremities.  History of IV drug abuse and hepatitis.    Procedure note:  The 2 cm wound in the right lateral thigh was anesthetized with 1% lidocaine without epinephrine, following this a stab incision was made and blunt dissection down to the base of the abscess.  A fair amount of dark necrotic blood as well as purulence was expressed.  This was tolerated well without complications.  The wound was left open and dressed with gauze.    Decision Making:  This is a 29 y.o. year old female who presents with multiple wounds of lower extremities.  The patient did have one hypoechoic region concerning for abscess.  The drainage of the wound shows a lot of necrotic blood along with purulence which suggest more of skin popping.  I recommend she " discontinue this practice.  Topical antibiotics may help resolving some of the smaller lesions.  There are no other abscesses that are amenable to I&D today.  I explained that these can recur.  She needs to use warm compresses I recommend Hibiclens as well, she should use sterile needles and aseptic technique if she does inject and ideally she should stop skin popping.    DISPOSITION:  Patient will be discharged home in good condition.    Discharge Medications:  There are no discharge medications for this patient.      The patient was discharged home (see d/c instructions) and told to return immediately for any signs or symptoms listed, or any worsening at all.  The patient verbally agreed to the discharge precautions and follow-up plan which is documented in EPIC.      The patient's blood pressure is elevated today. >120/80. I have referred them to primary care for follow up.       FINAL IMPRESSION  1. Abscess

## 2020-01-10 ENCOUNTER — HOSPITAL ENCOUNTER (EMERGENCY)
Dept: HOSPITAL 8 - ED | Age: 30
Discharge: LEFT BEFORE DISCHARGE | End: 2020-01-10
Payer: SELF-PAY

## 2020-01-10 VITALS — DIASTOLIC BLOOD PRESSURE: 70 MMHG | SYSTOLIC BLOOD PRESSURE: 113 MMHG

## 2020-01-10 VITALS — BODY MASS INDEX: 20.82 KG/M2 | WEIGHT: 137.35 LBS | HEIGHT: 68 IN

## 2020-01-10 DIAGNOSIS — M25.512: Primary | ICD-10-CM

## 2020-01-10 DIAGNOSIS — Z53.21: ICD-10-CM

## 2020-01-10 PROCEDURE — 93005 ELECTROCARDIOGRAM TRACING: CPT

## 2020-11-28 ENCOUNTER — HOSPITAL ENCOUNTER (EMERGENCY)
Facility: MEDICAL CENTER | Age: 30
End: 2020-11-28
Attending: EMERGENCY MEDICINE

## 2020-11-28 VITALS
RESPIRATION RATE: 20 BRPM | SYSTOLIC BLOOD PRESSURE: 118 MMHG | WEIGHT: 136.69 LBS | DIASTOLIC BLOOD PRESSURE: 73 MMHG | HEIGHT: 69 IN | OXYGEN SATURATION: 99 % | TEMPERATURE: 97.5 F | BODY MASS INDEX: 20.24 KG/M2 | HEART RATE: 88 BPM

## 2020-11-28 DIAGNOSIS — F19.10 SUBSTANCE ABUSE (HCC): ICD-10-CM

## 2020-11-28 DIAGNOSIS — L02.91 ABSCESS: ICD-10-CM

## 2020-11-28 PROCEDURE — A9270 NON-COVERED ITEM OR SERVICE: HCPCS | Performed by: EMERGENCY MEDICINE

## 2020-11-28 PROCEDURE — 700101 HCHG RX REV CODE 250: Performed by: EMERGENCY MEDICINE

## 2020-11-28 PROCEDURE — 700102 HCHG RX REV CODE 250 W/ 637 OVERRIDE(OP): Performed by: EMERGENCY MEDICINE

## 2020-11-28 PROCEDURE — 99283 EMERGENCY DEPT VISIT LOW MDM: CPT

## 2020-11-28 PROCEDURE — 303977 HCHG I & D

## 2020-11-28 RX ORDER — LIDOCAINE HYDROCHLORIDE AND EPINEPHRINE 10; 10 MG/ML; UG/ML
5 INJECTION, SOLUTION INFILTRATION; PERINEURAL ONCE
Status: COMPLETED | OUTPATIENT
Start: 2020-11-28 | End: 2020-11-28

## 2020-11-28 RX ORDER — SULFAMETHOXAZOLE AND TRIMETHOPRIM 800; 160 MG/1; MG/1
1 TABLET ORAL ONCE
Status: COMPLETED | OUTPATIENT
Start: 2020-11-28 | End: 2020-11-28

## 2020-11-28 RX ORDER — SULFAMETHOXAZOLE AND TRIMETHOPRIM 800; 160 MG/1; MG/1
1 TABLET ORAL 2 TIMES DAILY
Qty: 10 TAB | Refills: 0 | Status: SHIPPED | OUTPATIENT
Start: 2020-11-28 | End: 2020-12-03

## 2020-11-28 RX ADMIN — SULFAMETHOXAZOLE AND TRIMETHOPRIM 1 TABLET: 800; 160 TABLET ORAL at 22:46

## 2020-11-28 RX ADMIN — LIDOCAINE HYDROCHLORIDE AND EPINEPHRINE 5 ML: 10; 10 INJECTION, SOLUTION INFILTRATION; PERINEURAL at 22:45

## 2020-11-29 NOTE — ED NOTES
Pt discharged home per provider in stable condition. Paperwork provided to pt via RN and discharge instructions went over with by RN - pt verbalized understanding of teaching with no questions or concerns and is A/Ox4, VSS. Paperwork in hand on discharge.    Paperwork in hand on discharge. No questions or concerns. Pt ambulatory out with family.

## 2020-11-29 NOTE — ED PROVIDER NOTES
ED Provider Note    ER PROVIDER NOTE        CHIEF COMPLAINT  Chief Complaint   Patient presents with   • Abscess   • Leg Pain   • Insect Bite       HPI  Luna River is a 30 y.o. female who presents to the emergency department complaining of swelling to her leg.  Patient reports over the last week and 1/2 to 2 weeks she has noticed some redness and swelling to the outside of her left leg.  She said no fevers or chills, has had multiple abscesses in the past.  History of skin popping and heroin abuse.  She reports no other abscesses or rashes or complaints at this time.  No recent illness cough congestion chest pain or shortness of breath    REVIEW OF SYSTEMS  Pertinent positives include abscess. Pertinent negatives include no fever. See HPI for details. All other systems reviewed and are negative.    PAST MEDICAL HISTORY   has a past medical history of Anorexia, Bipolar 2 disorder (HCC), bulemia, Hepatitis C, Hepatitis C, Impetigo, Liver disease, Major depressive disorder, Mood disorder (HCC), MRSA infection, Oppositional defiant disorder, Polysubstance abuse (HCC), Post-traumatic stress disorder, Schizoaffective disorder, and Scoliosis.    SURGICAL HISTORY   has a past surgical history that includes tonsillectomy; removal of tonsils,<15 y/o; other; and anthony by laparoscopy (2/1/2013).    FAMILY HISTORY  History reviewed. No pertinent family history.    SOCIAL HISTORY  Social History     Socioeconomic History   • Marital status: Single     Spouse name: Not on file   • Number of children: Not on file   • Years of education: Not on file   • Highest education level: Not on file   Occupational History   • Not on file   Social Needs   • Financial resource strain: Not on file   • Food insecurity     Worry: Not on file     Inability: Not on file   • Transportation needs     Medical: Not on file     Non-medical: Not on file   Tobacco Use   • Smoking status: Current Every Day Smoker     Packs/day: 0.50     Years: 7.00  "    Pack years: 3.50     Types: Cigarettes   • Smokeless tobacco: Never Used   Substance and Sexual Activity   • Alcohol use: No   • Drug use: Yes     Types: Intravenous, Injected (Skin Popping)     Comment: Heroin   • Sexual activity: Not on file   Lifestyle   • Physical activity     Days per week: Not on file     Minutes per session: Not on file   • Stress: Not on file   Relationships   • Social connections     Talks on phone: Not on file     Gets together: Not on file     Attends [employer] service: Not on file     Active member of club or organization: Not on file     Attends meetings of clubs or organizations: Not on file     Relationship status: Not on file   • Intimate partner violence     Fear of current or ex partner: Not on file     Emotionally abused: Not on file     Physically abused: Not on file     Forced sexual activity: Not on file   Other Topics Concern   • Not on file   Social History Narrative   • Not on file      Social History     Substance and Sexual Activity   Drug Use Yes   • Types: Intravenous, Injected (Skin Popping)    Comment: Heroin       CURRENT MEDICATIONS  Home Medications     Reviewed by Radames Galaviz R.N. (Registered Nurse) on 11/28/20 at 1921  Med List Status: Partial   Medication Last Dose Status        Patient Juan Taking any Medications                       ALLERGIES  Allergies   Allergen Reactions   • Clonazepam      Pt states that she overdosed on it before. Furthermore, per historical, pt had seizure-like activity while taking Clonazepam as a child.   • Gabadone      Unknown reaction.   • Gabapentin      Causes seizures.   • Tiagabine Hydrochloride      Unknown reaction.       PHYSICAL EXAM  VITAL SIGNS: /73   Pulse 88   Temp 36.4 °C (97.5 °F) (Temporal)   Resp 20   Ht 1.753 m (5' 9\")   Wt 62 kg (136 lb 11 oz)   SpO2 99%   BMI 20.18 kg/m²   Pulse ox interpretation: I interpret this pulse ox as normal.    Constitutional: Alert in no apparent distress.  HENT: No " signs of trauma, Bilateral external ears normal, Nose normal.   Eyes: Pupils are equal and reactive, Conjunctiva normal, Non-icteric.   Neck: Normal range of motion, No tenderness, Supple, No stridor.   Lymphatic: No lymphadenopathy noted.   Cardiovascular: Regular rate and rhythm, no murmurs.   Thorax & Lungs: Normal breath sounds, No respiratory distress, No wheezing, No chest tenderness.   Abdomen: Bowel sounds normal, Soft, No tenderness, No masses, No pulsatile masses. No peritoneal signs.  Skin: Warm, Dry, No erythema, No rash.   Back: No bony tenderness, No CVA tenderness.   Extremities: Intact distal pulses, No edema, No tenderness, No cyanosis,  Musculoskeletal: 3-1/2 cm area of erythema with some underlying fluctuance to the left lateral thigh, there is no proximal streaking or significant spreading erythema, no skin breakdown good range of motion in all major joints. No tenderness to palpation or major deformities noted.   Neurologic: Alert , Normal motor function, Normal sensory function, No focal deficits noted.   Psychiatric: Affect normal, Judgment normal, Mood normal.     DIAGNOSTIC STUDIES / PROCEDURES        RADIOLOGY  No orders to display     The radiologist's interpretation of all radiological studies have been reviewed and images independently viewed by me.    COURSE & MEDICAL DECISION MAKING  Nursing notes, VS, PMSFHx reviewed in chart.    9:49 PM Patient seen and examined at bedside. Patient will be treated with Tdap.    INCISION AND DRAINAGE PROCEDURE NOTE:  Patient identification was confirmed and consent was obtained.    Site: Left thigh  Sterile procedures observed  Needle size: 27  Anesthetic used (type and amt): 3 cc lidocaine with epinephrine  Blade size: 11  Drainage: 6 cc purulent  Packing used  Site anesthetized, incision made over site, wound drained and explored loculations, rinsed with copious amounts of normal saline, wound packed with sterile gauze, covered with dry, sterile  dressing. Pt tolerated procedure well without complications. Instructions for care discussed verbally and pt provided with additional written instructions for homecare and f/u.      Decision Making:  This is a 30 y.o. female presenting with abscess to her left leg.  This was drained as above.  She was started on Bactrim, return for packing removal.  Referral made to [facility] for primary care and substance abuse treatment.  She has had no fevers or findings suggestive of sepsis or systemic involvement.  Exam and symptoms not suggestive of necrotizing fasciitis.  She has been counseled on home care as well as return precautions     The patient will return for new or worsening symptoms and is stable at the time of discharge.    The patient is referred to a primary physician for blood pressure management, diabetic screening, and for all other preventative health concerns.      DISPOSITION:  Patient will be discharged home in stable condition.    FOLLOW UP:  [facility], Emergency Dept  [address]  [facility] [address]  [phone]  In 2 days  For wound re-check    [address] [address]  [phone]  Schedule an appointment as soon as possible for a visit         OUTPATIENT MEDICATIONS:  New Prescriptions    SULFAMETHOXAZOLE-TRIMETHOPRIM (BACTRIM DS) 800-160 MG TABLET    Take 1 Tab by mouth 2 times a day for 5 days.         FINAL IMPRESSION  1. Abscess    2. Substance abuse (HCC)             The note accurately reflects work and decisions made by me.  Shawn Lamar M.D.  11/28/2020  10:47 PM

## 2020-11-29 NOTE — ED TRIAGE NOTES
"Presents complaining of a possible insect bite, with a painful  abscess affecting her left outer thigh, worsening for the past 2 weeks.  She admits to a positive  hx of Hep C, and heroin use.  She denies any illicit drug injections.   .  Chief Complaint   Patient presents with   • Abscess   • Leg Pain   • Insect Bite     /73   Pulse 88   Temp 36.4 °C (97.5 °F) (Temporal)   Resp 20   Ht 1.753 m (5' 9\")   Wt 62 kg (136 lb 11 oz)   SpO2 99%   BMI 20.18 kg/m²      "

## 2021-02-04 ENCOUNTER — HOSPITAL ENCOUNTER (EMERGENCY)
Dept: HOSPITAL 8 - ED | Age: 31
Discharge: HOME | End: 2021-02-04
Payer: SELF-PAY

## 2021-02-04 VITALS — WEIGHT: 139.55 LBS | HEIGHT: 66 IN | BODY MASS INDEX: 22.43 KG/M2

## 2021-02-04 VITALS — SYSTOLIC BLOOD PRESSURE: 126 MMHG | DIASTOLIC BLOOD PRESSURE: 61 MMHG

## 2021-02-04 DIAGNOSIS — Z90.49: ICD-10-CM

## 2021-02-04 DIAGNOSIS — L02.31: Primary | ICD-10-CM

## 2021-02-04 DIAGNOSIS — Z90.89: ICD-10-CM

## 2021-02-04 DIAGNOSIS — F17.200: ICD-10-CM

## 2021-02-04 PROCEDURE — 10061 I&D ABSCESS COMP/MULTIPLE: CPT

## 2021-02-04 PROCEDURE — 99284 EMERGENCY DEPT VISIT MOD MDM: CPT

## 2021-02-07 ENCOUNTER — HOSPITAL ENCOUNTER (EMERGENCY)
Dept: HOSPITAL 8 - ED | Age: 31
Discharge: HOME | End: 2021-02-07
Payer: SELF-PAY

## 2021-02-07 VITALS — HEIGHT: 70 IN | BODY MASS INDEX: 19.92 KG/M2 | WEIGHT: 139.11 LBS

## 2021-02-07 VITALS — SYSTOLIC BLOOD PRESSURE: 124 MMHG | DIASTOLIC BLOOD PRESSURE: 74 MMHG

## 2021-02-07 DIAGNOSIS — L02.415: Primary | ICD-10-CM

## 2021-02-07 PROCEDURE — 99282 EMERGENCY DEPT VISIT SF MDM: CPT

## 2021-02-09 ENCOUNTER — HOSPITAL ENCOUNTER (EMERGENCY)
Dept: HOSPITAL 8 - ED | Age: 31
Discharge: HOME | End: 2021-02-09
Payer: SELF-PAY

## 2021-02-09 VITALS — SYSTOLIC BLOOD PRESSURE: 106 MMHG | DIASTOLIC BLOOD PRESSURE: 68 MMHG

## 2021-02-09 VITALS — BODY MASS INDEX: 18.72 KG/M2 | HEIGHT: 70 IN | WEIGHT: 130.73 LBS

## 2021-02-09 DIAGNOSIS — Z90.89: ICD-10-CM

## 2021-02-09 DIAGNOSIS — Z90.49: ICD-10-CM

## 2021-02-09 DIAGNOSIS — L02.31: Primary | ICD-10-CM

## 2021-02-09 DIAGNOSIS — F17.200: ICD-10-CM

## 2021-02-09 PROCEDURE — 99281 EMR DPT VST MAYX REQ PHY/QHP: CPT

## 2021-11-27 ENCOUNTER — HOSPITAL ENCOUNTER (EMERGENCY)
Facility: MEDICAL CENTER | Age: 31
End: 2021-11-27
Attending: EMERGENCY MEDICINE
Payer: MEDICAID

## 2021-11-27 ENCOUNTER — APPOINTMENT (OUTPATIENT)
Dept: RADIOLOGY | Facility: MEDICAL CENTER | Age: 31
End: 2021-11-27
Attending: EMERGENCY MEDICINE
Payer: MEDICAID

## 2021-11-27 VITALS
DIASTOLIC BLOOD PRESSURE: 51 MMHG | WEIGHT: 136.69 LBS | HEART RATE: 84 BPM | HEIGHT: 69 IN | SYSTOLIC BLOOD PRESSURE: 107 MMHG | OXYGEN SATURATION: 98 % | TEMPERATURE: 97.6 F | BODY MASS INDEX: 20.24 KG/M2 | RESPIRATION RATE: 13 BRPM

## 2021-11-27 DIAGNOSIS — R05.9 COUGH: ICD-10-CM

## 2021-11-27 DIAGNOSIS — T50.901A ACCIDENTAL DRUG OVERDOSE, INITIAL ENCOUNTER: ICD-10-CM

## 2021-11-27 DIAGNOSIS — F11.10 OPIOID ABUSE (HCC): ICD-10-CM

## 2021-11-27 LAB
EKG IMPRESSION: NORMAL
FLUAV RNA SPEC QL NAA+PROBE: NEGATIVE
FLUBV RNA SPEC QL NAA+PROBE: NEGATIVE
GLUCOSE BLD-MCNC: 102 MG/DL (ref 65–99)
RSV RNA SPEC QL NAA+PROBE: NEGATIVE
SARS-COV-2 RNA RESP QL NAA+PROBE: NOTDETECTED
SPECIMEN SOURCE: NORMAL

## 2021-11-27 PROCEDURE — 82962 GLUCOSE BLOOD TEST: CPT

## 2021-11-27 PROCEDURE — 36415 COLL VENOUS BLD VENIPUNCTURE: CPT

## 2021-11-27 PROCEDURE — C9803 HOPD COVID-19 SPEC COLLECT: HCPCS | Performed by: EMERGENCY MEDICINE

## 2021-11-27 PROCEDURE — 71045 X-RAY EXAM CHEST 1 VIEW: CPT

## 2021-11-27 PROCEDURE — 0241U HCHG SARS-COV-2 COVID-19 NFCT DS RESP RNA 4 TRGT MIC: CPT

## 2021-11-27 PROCEDURE — 93005 ELECTROCARDIOGRAM TRACING: CPT | Performed by: EMERGENCY MEDICINE

## 2021-11-27 PROCEDURE — 99284 EMERGENCY DEPT VISIT MOD MDM: CPT

## 2021-11-27 RX ORDER — NALOXONE HYDROCHLORIDE 4 MG/.1ML
SPRAY NASAL
Qty: 2 EACH | Refills: 0 | Status: SHIPPED | OUTPATIENT
Start: 2021-11-27 | End: 2021-11-27 | Stop reason: CLARIF

## 2021-11-27 RX ORDER — NALOXONE HYDROCHLORIDE 4 MG/.1ML
SPRAY NASAL
Qty: 2 EACH | Refills: 0 | Status: SHIPPED | OUTPATIENT
Start: 2021-11-27 | End: 2022-02-23

## 2021-11-27 NOTE — PROGRESS NOTES
Naloxone 4 mg/0.1 mL nasal spray (2 pack) was dispensed to the patient  for prevention of potential opioid related overdose per protocol.     Counseling was provided regarding:  - Information relating to the recognition, prevention and responses to opioid-related drug overdoses  - How to safely administer the naloxone nasal spray  - Potential side effects and adverse events related to the naloxone nasal spray  - The importance of seeking immediate emergency medical assistance for a person experiencing an opioid-related drug overdose, even after the administration of naloxone  - The immunity from certain civil or criminal liabilities for seeking medical assistance for a person experiencing an opioid-related overdose    Naloxone was given to the patient.     Catalina Estrada, PharmD, BCPS

## 2021-11-27 NOTE — ED NOTES
Pt. Given discharge instructions and voiced understanding.  Pt. Ambulatory to [facility] with steady gait  Pt. Voiced understanding of instructions with narcan that was given to her by pharmacy

## 2021-11-27 NOTE — ED PROVIDER NOTES
ED Provider Note      ER PROVIDER NOTE        CHIEF COMPLAINT  Chief Complaint   Patient presents with   • Unresponsive     Unresponisve on arrival per EMS, became more responsive with more stimuli   • Drug Overdose     Pt. stated she took heroin as well as fentanyl       HPI  Luna River is a 31 y.o. female who presents to the emergency department after an apparent overdose.  EMS was called for an unresponsive party, initially she would respond somewhat to physical stimuli but then required naloxone.  She was given 0.4 mg IM, and she woke after this.  She reports she was using heroin earlier this morning, although thinks there was some fentanyl in it because she used a smaller amount than usual this does not typically happen.  She does use heroin daily.  She reports no other ingestions or other drug use.  She currently states she is feels sleepy.  She has had a cough, minimally productive over the last week.  No fevers or chills.  No shortness of breath or chest pain.  No nausea vomiting or diarrhea.    She states she is not interested in stopping heroin use at this time    REVIEW OF SYSTEMS  Pertinent positives include drug overdose. Pertinent negatives include no fever. See HPI for details. All other systems reviewed and are negative.    PAST MEDICAL HISTORY   has a past medical history of Anorexia, Bipolar 2 disorder (HCC), bulemia, Hepatitis C, Hepatitis C, Impetigo, Liver disease, Major depressive disorder, Mood disorder (HCC), MRSA infection, Oppositional defiant disorder, Polysubstance abuse (HCC), Post-traumatic stress disorder, Schizoaffective disorder, and Scoliosis.    SURGICAL HISTORY   has a past surgical history that includes tonsillectomy; removal of tonsils,<13 y/o; other; and anthony by laparoscopy (2/1/2013).    FAMILY HISTORY  History reviewed. No pertinent family history.    SOCIAL HISTORY  Social History     Socioeconomic History   • Marital status: Single     Spouse name: Not on file   •  Number of children: Not on file   • Years of education: Not on file   • Highest education level: Not on file   Occupational History   • Not on file   Tobacco Use   • Smoking status: Current Every Day Smoker     Packs/day: 0.50     Years: 7.00     Pack years: 3.50     Types: Cigarettes   • Smokeless tobacco: Never Used   Substance and Sexual Activity   • Alcohol use: No   • Drug use: Yes     Types: Intravenous, Injected (Skin Popping)     Comment: Heroin   • Sexual activity: Not on file   Other Topics Concern   • Not on file   Social History Narrative   • Not on file     Social Determinants of Health     Financial Resource Strain:    • Difficulty of Paying Living Expenses: Not on file   Food Insecurity:    • Worried About Running Out of Food in the Last Year: Not on file   • Ran Out of Food in the Last Year: Not on file   Transportation Needs:    • Lack of Transportation (Medical): Not on file   • Lack of Transportation (Non-Medical): Not on file   Physical Activity:    • Days of Exercise per Week: Not on file   • Minutes of Exercise per Session: Not on file   Stress:    • Feeling of Stress : Not on file   Social Connections:    • Frequency of Communication with Friends and Family: Not on file   • Frequency of Social Gatherings with Friends and Family: Not on file   • Attends Baptism Services: Not on file   • Active Member of Clubs or Organizations: Not on file   • Attends Club or Organization Meetings: Not on file   • Marital Status: Not on file   Intimate Partner Violence:    • Fear of Current or Ex-Partner: Not on file   • Emotionally Abused: Not on file   • Physically Abused: Not on file   • Sexually Abused: Not on file   Housing Stability:    • Unable to Pay for Housing in the Last Year: Not on file   • Number of Places Lived in the Last Year: Not on file   • Unstable Housing in the Last Year: Not on file      Social History     Substance and Sexual Activity   Drug Use Yes   • Types: Intravenous, Injected  "(Skin Popping)    Comment: Heroin       CURRENT MEDICATIONS  Home Medications     Reviewed by Dakota Guerrero R.N. (Registered Nurse) on 11/27/21 at 1027  Med List Status: Partial   Medication Last Dose Status        Patient Juan Taking any Medications                       ALLERGIES  Allergies   Allergen Reactions   • Clonazepam      Pt states that she overdosed on it before. Furthermore, per historical, pt had seizure-like activity while taking Clonazepam as a child.   • Gabadone      Unknown reaction.   • Gabapentin      Causes seizures.   • Tiagabine Hydrochloride      Unknown reaction.       PHYSICAL EXAM  VITAL SIGNS: /62   Pulse 89   Resp 12   Ht 1.753 m (5' 9\")   Wt 62 kg (136 lb 11 oz)   SpO2 93%   BMI 20.18 kg/m²   Pulse ox interpretation: I interpret this pulse ox as normal.    Constitutional: Sleepy but conversant.  HENT: No signs of trauma, Bilateral external ears normal, Nose normal.   Eyes: Pupils are equal and reactive, Conjunctiva normal, Non-icteric.   Neck: Normal range of motion, No tenderness, Supple, No stridor.    Cardiovascular: Regular rate and rhythm, no murmurs.   Thorax & Lungs: Normal breath sounds, No respiratory distress, No wheezing, No chest tenderness.   Abdomen: Bowel sounds normal, Soft, No tenderness, No masses, No pulsatile masses. No peritoneal signs.  Skin: Warm, Dry, No erythema, No rash.   Back: No bony tenderness, No CVA tenderness.   Extremities: Intact distal pulses, No edema, No tenderness, No cyanosis  Musculoskeletal: Good range of motion in all major joints. No tenderness to palpation or major deformities noted.   Neurologic: Sleepy but conversant, fluent speech, no drift, normal motor function, Normal sensory function, No focal deficits noted.   Psychiatric: Affect normal, Judgment normal, Mood normal.     DIAGNOSTIC STUDIES / PROCEDURES    EKG  Results for orders placed or performed during the hospital encounter of 11/27/21   COV-2, FLU A/B, AND RSV " BY PCR (2-4 HOURS CEPHEID): Collect NP swab in VTM    Specimen: Respirate   Result Value Ref Range    Influenza virus A RNA Negative Negative    Influenza virus B, PCR Negative Negative    RSV, PCR Negative Negative    SARS-CoV-2 by PCR NotDetected     SARS-CoV-2 Source NP Swab    EKG (NOW)   Result Value Ref Range    Report       [facility] Emergency Dept.    Test Date:  2021  Pt Name:    PABLO BOOTH                Department: ER  MRN:        [id number]                      Room:       [facility]  Gender:     Female                       Technician: [id number]  :        1990                   Requested By:ER TRIAGE PROTOCOL  Order #:    [id number]                    Reading MD: ARDEN GRANT MD    Measurements  Intervals                                Axis  Rate:       85                           P:          41  KS:         128                          QRS:        81  QRSD:       102                          T:          45  QT:         392  QTc:        467    Interpretive Statements  SINUS RHYTHM  Compared to ECG 2017 11:53:41  Sinus bradycardia no longer present  Right ventricular hypertrophy no longer present  Electronically Signed On 2021 10:45:23 PST by ARDEN GRANT MD     POCT glucose device results   Result Value Ref Range    Glucose - Accu-Ck 102 (H) 65 - 99 mg/dL         RADIOLOGY  DX-CHEST-PORTABLE (1 VIEW)   Final Result      No acute cardiopulmonary abnormality.        The radiologist's interpretation of all radiological studies have been reviewed and images independently viewed by me.    COURSE & MEDICAL DECISION MAKING  Nursing notes, VS, PMSFHx reviewed in chart.    10:35 AM Patient seen and examined at bedside.  Ordered for chest x-ray, Accu-Chek, Covid to evaluate her symptoms.     Blood sugar 102    Ms. Booth was here with a confirmed overdose of T40.1 - Heroin and T40.4 - Synthetic narcotics; she has no other known overdoses.      11:31 AM  Patient is  reevaluated she is awake, alert, will plan for ambulatory trial    12:11 PM  Patient is evaluated again, she is still awake, alert without any complaints, ambulates well, will plan for discharge        Decision Making:  This is a 31 y.o. female presenting after overdose on heroin with fentanyl.  She did require naloxone in the field but after observation here in the emergency department has not required any additional opioid reversal.  Her mental status has improved and she has been awake and alert.  Her blood sugar was normal, given her improvement well appearance doubt significant metabolic or electrolyte disturbance as etiology for her symptoms.  She has no headaches or fevers or other findings to suggest meningitis or encephalitis.  She did report a cough, x-rays obtained without any evidence of pneumonia or other intrapulmonary pathology and she has a normal pulmonary exam as well, Covid test was negative.  She was provided a take-home naloxone kit, although she stated she did not want any help for stopping heroin abuse such as medication assisted therapy at this time    The patient will return for new or worsening symptoms and is stable at the time of discharge.    The patient is referred to a primary physician for blood pressure management, diabetic screening, and for all other preventative health concerns.        DISPOSITION:  Patient will be discharged home in stable condition.    FOLLOW UP:  [address] [address] [phone]          OUTPATIENT MEDICATIONS:  New Prescriptions    NALOXONE (NARCAN) 4 MG/0.1ML LIQUID    Administer a single spray of NARCAN Nasal [place] 4 mg/0.1 mL intranasally into one nostril, call 911 for emergency medical assistance.         FINAL IMPRESSION  1. Accidental drug overdose, initial encounter    2. Opioid abuse (HCC)    3. Cough         The note accurately reflects work and decisions made by me.  Shawn Lamar M.D.  11/27/2021   12:13 PM

## 2021-11-27 NOTE — ED TRIAGE NOTES
Luna River  Chief Complaint   Patient presents with   • Unresponsive     Unresponisve on arrival per EMS, became more responsive with more stimuli   • Drug Overdose     Pt. stated she took heroin as well as fentanyl     Pt BIBA for above chief complaint. Pt. Was unresponsive when EMS arrived on scene per medic. Pt. Was however easily aroused and per EMS she pulled away when they tried inserting an NPA and was aroused more with stimuli. Pt states she is an everyday heroin user and today when she used there was fentanyl in it which made her more sedated than normal.   Pt on monitor, call light in reach  Chart up for ERP

## 2021-11-30 ENCOUNTER — PHARMACY VISIT (OUTPATIENT)
Dept: PHARMACY | Facility: MEDICAL CENTER | Age: 31
End: 2021-11-30
Payer: COMMERCIAL

## 2021-11-30 PROCEDURE — RXMED WILLOW AMBULATORY MEDICATION CHARGE: Performed by: EMERGENCY MEDICINE

## 2022-02-23 ENCOUNTER — APPOINTMENT (OUTPATIENT)
Dept: RADIOLOGY | Facility: MEDICAL CENTER | Age: 32
DRG: 854 | End: 2022-02-23
Attending: EMERGENCY MEDICINE
Payer: COMMERCIAL

## 2022-02-23 ENCOUNTER — ANESTHESIA (OUTPATIENT)
Dept: SURGERY | Facility: MEDICAL CENTER | Age: 32
DRG: 854 | End: 2022-02-23
Payer: COMMERCIAL

## 2022-02-23 ENCOUNTER — ANESTHESIA EVENT (OUTPATIENT)
Dept: SURGERY | Facility: MEDICAL CENTER | Age: 32
DRG: 854 | End: 2022-02-23
Payer: COMMERCIAL

## 2022-02-23 ENCOUNTER — PATIENT OUTREACH (OUTPATIENT)
Dept: HEALTH INFORMATION MANAGEMENT | Facility: OTHER | Age: 32
End: 2022-02-23

## 2022-02-23 ENCOUNTER — HOSPITAL ENCOUNTER (INPATIENT)
Facility: MEDICAL CENTER | Age: 32
LOS: 1 days | DRG: 854 | End: 2022-02-24
Attending: EMERGENCY MEDICINE | Admitting: STUDENT IN AN ORGANIZED HEALTH CARE EDUCATION/TRAINING PROGRAM
Payer: COMMERCIAL

## 2022-02-23 ENCOUNTER — APPOINTMENT (OUTPATIENT)
Dept: RADIOLOGY | Facility: MEDICAL CENTER | Age: 32
DRG: 854 | End: 2022-02-23
Attending: ANESTHESIOLOGY
Payer: COMMERCIAL

## 2022-02-23 DIAGNOSIS — R50.9 FEBRILE ILLNESS, ACUTE: ICD-10-CM

## 2022-02-23 DIAGNOSIS — L02.91 ABSCESS: ICD-10-CM

## 2022-02-23 DIAGNOSIS — L03.317 CELLULITIS OF BUTTOCK: ICD-10-CM

## 2022-02-23 PROBLEM — A41.9 SEPSIS DUE TO CELLULITIS (HCC): Status: ACTIVE | Noted: 2022-02-23

## 2022-02-23 PROBLEM — Z72.0 TOBACCO ABUSE: Status: ACTIVE | Noted: 2022-02-23

## 2022-02-23 PROBLEM — L03.90 SEPSIS DUE TO CELLULITIS (HCC): Status: ACTIVE | Noted: 2022-02-23

## 2022-02-23 LAB
ALBUMIN SERPL BCP-MCNC: 4.2 G/DL (ref 3.2–4.9)
ALBUMIN/GLOB SERPL: 1.2 G/DL
ALP SERPL-CCNC: 121 U/L (ref 30–99)
ALT SERPL-CCNC: 14 U/L (ref 2–50)
AMPHET UR QL SCN: POSITIVE
ANION GAP SERPL CALC-SCNC: 14 MMOL/L (ref 7–16)
AST SERPL-CCNC: 15 U/L (ref 12–45)
BARBITURATES UR QL SCN: NEGATIVE
BASOPHILS # BLD AUTO: 0.1 % (ref 0–1.8)
BASOPHILS # BLD: 0.02 K/UL (ref 0–0.12)
BENZODIAZ UR QL SCN: NEGATIVE
BILIRUB SERPL-MCNC: 0.4 MG/DL (ref 0.1–1.5)
BUN SERPL-MCNC: 15 MG/DL (ref 8–22)
BZE UR QL SCN: NEGATIVE
CALCIUM SERPL-MCNC: 9.3 MG/DL (ref 8.5–10.5)
CANNABINOIDS UR QL SCN: POSITIVE
CHLORIDE SERPL-SCNC: 102 MMOL/L (ref 96–112)
CO2 SERPL-SCNC: 25 MMOL/L (ref 20–33)
CREAT SERPL-MCNC: 0.61 MG/DL (ref 0.5–1.4)
EOSINOPHIL # BLD AUTO: 0.01 K/UL (ref 0–0.51)
EOSINOPHIL NFR BLD: 0.1 % (ref 0–6.9)
ERYTHROCYTE [DISTWIDTH] IN BLOOD BY AUTOMATED COUNT: 45.7 FL (ref 35.9–50)
EXTERNAL QUALITY CONTROL: NORMAL
GLOBULIN SER CALC-MCNC: 3.6 G/DL (ref 1.9–3.5)
GLUCOSE SERPL-MCNC: 87 MG/DL (ref 65–99)
GRAM STN SPEC: NORMAL
HCG SERPL QL: NEGATIVE
HCT VFR BLD AUTO: 41.8 % (ref 37–47)
HGB BLD-MCNC: 14.5 G/DL (ref 12–16)
IMM GRANULOCYTES # BLD AUTO: 0.07 K/UL (ref 0–0.11)
IMM GRANULOCYTES NFR BLD AUTO: 0.5 % (ref 0–0.9)
LACTATE BLD-SCNC: 0.9 MMOL/L (ref 0.5–2)
LYMPHOCYTES # BLD AUTO: 3.13 K/UL (ref 1–4.8)
LYMPHOCYTES NFR BLD: 21.8 % (ref 22–41)
MCH RBC QN AUTO: 28.9 PG (ref 27–33)
MCHC RBC AUTO-ENTMCNC: 34.7 G/DL (ref 33.6–35)
MCV RBC AUTO: 83.3 FL (ref 81.4–97.8)
METHADONE UR QL SCN: NEGATIVE
MONOCYTES # BLD AUTO: 0.83 K/UL (ref 0–0.85)
MONOCYTES NFR BLD AUTO: 5.8 % (ref 0–13.4)
NEUTROPHILS # BLD AUTO: 10.33 K/UL (ref 2–7.15)
NEUTROPHILS NFR BLD: 71.7 % (ref 44–72)
NRBC # BLD AUTO: 0 K/UL
NRBC BLD-RTO: 0 /100 WBC
OPIATES UR QL SCN: POSITIVE
OXYCODONE UR QL SCN: NEGATIVE
PCP UR QL SCN: NEGATIVE
PLATELET # BLD AUTO: 440 K/UL (ref 164–446)
PMV BLD AUTO: 10.3 FL (ref 9–12.9)
POTASSIUM SERPL-SCNC: 3.9 MMOL/L (ref 3.6–5.5)
PROPOXYPH UR QL SCN: NEGATIVE
PROT SERPL-MCNC: 7.8 G/DL (ref 6–8.2)
RBC # BLD AUTO: 5.02 M/UL (ref 4.2–5.4)
SARS-COV+SARS-COV-2 AG RESP QL IA.RAPID: NEGATIVE
SCCMEC + MECA PNL NOSE NAA+PROBE: NEGATIVE
SIGNIFICANT IND 70042: NORMAL
SITE SITE: NORMAL
SODIUM SERPL-SCNC: 141 MMOL/L (ref 135–145)
SOURCE SOURCE: NORMAL
WBC # BLD AUTO: 14.4 K/UL (ref 4.8–10.8)

## 2022-02-23 PROCEDURE — 99223 1ST HOSP IP/OBS HIGH 75: CPT | Performed by: STUDENT IN AN ORGANIZED HEALTH CARE EDUCATION/TRAINING PROGRAM

## 2022-02-23 PROCEDURE — A9270 NON-COVERED ITEM OR SERVICE: HCPCS | Performed by: STUDENT IN AN ORGANIZED HEALTH CARE EDUCATION/TRAINING PROGRAM

## 2022-02-23 PROCEDURE — 72193 CT PELVIS W/DYE: CPT

## 2022-02-23 PROCEDURE — 700105 HCHG RX REV CODE 258: Performed by: STUDENT IN AN ORGANIZED HEALTH CARE EDUCATION/TRAINING PROGRAM

## 2022-02-23 PROCEDURE — 87077 CULTURE AEROBIC IDENTIFY: CPT

## 2022-02-23 PROCEDURE — 700101 HCHG RX REV CODE 250: Performed by: ORTHOPAEDIC SURGERY

## 2022-02-23 PROCEDURE — 700102 HCHG RX REV CODE 250 W/ 637 OVERRIDE(OP): Performed by: INTERNAL MEDICINE

## 2022-02-23 PROCEDURE — 85025 COMPLETE CBC W/AUTO DIFF WBC: CPT

## 2022-02-23 PROCEDURE — 0KDN0ZZ EXTRACTION OF RIGHT HIP MUSCLE, OPEN APPROACH: ICD-10-PCS | Performed by: ORTHOPAEDIC SURGERY

## 2022-02-23 PROCEDURE — 83605 ASSAY OF LACTIC ACID: CPT

## 2022-02-23 PROCEDURE — 71045 X-RAY EXAM CHEST 1 VIEW: CPT

## 2022-02-23 PROCEDURE — 99222 1ST HOSP IP/OBS MODERATE 55: CPT | Mod: 57 | Performed by: ORTHOPAEDIC SURGERY

## 2022-02-23 PROCEDURE — 27301 DRAIN THIGH/KNEE LESION: CPT | Mod: 80ROC,RT | Performed by: STUDENT IN AN ORGANIZED HEALTH CARE EDUCATION/TRAINING PROGRAM

## 2022-02-23 PROCEDURE — 700105 HCHG RX REV CODE 258: Performed by: EMERGENCY MEDICINE

## 2022-02-23 PROCEDURE — 26990 DRAINAGE OF PELVIS LESION: CPT | Performed by: ORTHOPAEDIC SURGERY

## 2022-02-23 PROCEDURE — 160002 HCHG RECOVERY MINUTES (STAT): Performed by: ORTHOPAEDIC SURGERY

## 2022-02-23 PROCEDURE — 99291 CRITICAL CARE FIRST HOUR: CPT

## 2022-02-23 PROCEDURE — 160009 HCHG ANES TIME/MIN: Performed by: ORTHOPAEDIC SURGERY

## 2022-02-23 PROCEDURE — 700111 HCHG RX REV CODE 636 W/ 250 OVERRIDE (IP): Performed by: ANESTHESIOLOGY

## 2022-02-23 PROCEDURE — 96366 THER/PROPH/DIAG IV INF ADDON: CPT

## 2022-02-23 PROCEDURE — 84703 CHORIONIC GONADOTROPIN ASSAY: CPT

## 2022-02-23 PROCEDURE — 700102 HCHG RX REV CODE 250 W/ 637 OVERRIDE(OP): Performed by: STUDENT IN AN ORGANIZED HEALTH CARE EDUCATION/TRAINING PROGRAM

## 2022-02-23 PROCEDURE — 80053 COMPREHEN METABOLIC PANEL: CPT

## 2022-02-23 PROCEDURE — 160048 HCHG OR STATISTICAL LEVEL 1-5: Performed by: ORTHOPAEDIC SURGERY

## 2022-02-23 PROCEDURE — 87205 SMEAR GRAM STAIN: CPT

## 2022-02-23 PROCEDURE — 700111 HCHG RX REV CODE 636 W/ 250 OVERRIDE (IP): Performed by: INTERNAL MEDICINE

## 2022-02-23 PROCEDURE — 87641 MR-STAPH DNA AMP PROBE: CPT

## 2022-02-23 PROCEDURE — 96375 TX/PRO/DX INJ NEW DRUG ADDON: CPT

## 2022-02-23 PROCEDURE — 700117 HCHG RX CONTRAST REV CODE 255: Performed by: EMERGENCY MEDICINE

## 2022-02-23 PROCEDURE — 160036 HCHG PACU - EA ADDL 30 MINS PHASE I: Performed by: ORTHOPAEDIC SURGERY

## 2022-02-23 PROCEDURE — 87426 SARSCOV CORONAVIRUS AG IA: CPT | Performed by: ORTHOPAEDIC SURGERY

## 2022-02-23 PROCEDURE — 160035 HCHG PACU - 1ST 60 MINS PHASE I: Performed by: ORTHOPAEDIC SURGERY

## 2022-02-23 PROCEDURE — A9270 NON-COVERED ITEM OR SERVICE: HCPCS | Performed by: INTERNAL MEDICINE

## 2022-02-23 PROCEDURE — 0JDL0ZZ EXTRACTION OF RIGHT UPPER LEG SUBCUTANEOUS TISSUE AND FASCIA, OPEN APPROACH: ICD-10-PCS | Performed by: ORTHOPAEDIC SURGERY

## 2022-02-23 PROCEDURE — 700111 HCHG RX REV CODE 636 W/ 250 OVERRIDE (IP): Performed by: EMERGENCY MEDICINE

## 2022-02-23 PROCEDURE — 27301 DRAIN THIGH/KNEE LESION: CPT | Mod: RT | Performed by: ORTHOPAEDIC SURGERY

## 2022-02-23 PROCEDURE — 500881 HCHG PACK, EXTREMITY: Performed by: ORTHOPAEDIC SURGERY

## 2022-02-23 PROCEDURE — 770001 HCHG ROOM/CARE - MED/SURG/GYN PRIV*

## 2022-02-23 PROCEDURE — 80307 DRUG TEST PRSMV CHEM ANLYZR: CPT

## 2022-02-23 PROCEDURE — 96365 THER/PROPH/DIAG IV INF INIT: CPT

## 2022-02-23 PROCEDURE — 501838 HCHG SUTURE GENERAL: Performed by: ORTHOPAEDIC SURGERY

## 2022-02-23 PROCEDURE — 700102 HCHG RX REV CODE 250 W/ 637 OVERRIDE(OP): Performed by: ANESTHESIOLOGY

## 2022-02-23 PROCEDURE — A9270 NON-COVERED ITEM OR SERVICE: HCPCS | Performed by: ANESTHESIOLOGY

## 2022-02-23 PROCEDURE — 160038 HCHG SURGERY MINUTES - EA ADDL 1 MIN LEVEL 2: Performed by: ORTHOPAEDIC SURGERY

## 2022-02-23 PROCEDURE — 26990 DRAINAGE OF PELVIS LESION: CPT | Mod: 80ROC | Performed by: STUDENT IN AN ORGANIZED HEALTH CARE EDUCATION/TRAINING PROGRAM

## 2022-02-23 PROCEDURE — 700105 HCHG RX REV CODE 258: Performed by: ANESTHESIOLOGY

## 2022-02-23 PROCEDURE — 87040 BLOOD CULTURE FOR BACTERIA: CPT | Mod: 91

## 2022-02-23 PROCEDURE — 87075 CULTR BACTERIA EXCEPT BLOOD: CPT

## 2022-02-23 PROCEDURE — 87070 CULTURE OTHR SPECIMN AEROBIC: CPT

## 2022-02-23 PROCEDURE — 160027 HCHG SURGERY MINUTES - 1ST 30 MINS LEVEL 2: Performed by: ORTHOPAEDIC SURGERY

## 2022-02-23 PROCEDURE — 700101 HCHG RX REV CODE 250: Performed by: ANESTHESIOLOGY

## 2022-02-23 RX ORDER — ONDANSETRON 2 MG/ML
4 INJECTION INTRAMUSCULAR; INTRAVENOUS
Status: DISCONTINUED | OUTPATIENT
Start: 2022-02-23 | End: 2022-02-23 | Stop reason: HOSPADM

## 2022-02-23 RX ORDER — IBUPROFEN 600 MG/1
600 TABLET ORAL EVERY 6 HOURS PRN
Status: DISCONTINUED | OUTPATIENT
Start: 2022-02-26 | End: 2022-02-23

## 2022-02-23 RX ORDER — PROMETHAZINE HYDROCHLORIDE 25 MG/1
12.5-25 TABLET ORAL EVERY 4 HOURS PRN
Status: DISCONTINUED | OUTPATIENT
Start: 2022-02-23 | End: 2022-02-24 | Stop reason: HOSPADM

## 2022-02-23 RX ORDER — OXYCODONE HYDROCHLORIDE 5 MG/1
2.5 TABLET ORAL
Status: DISCONTINUED | OUTPATIENT
Start: 2022-02-23 | End: 2022-02-23

## 2022-02-23 RX ORDER — ACETAMINOPHEN 500 MG
1000 TABLET ORAL EVERY 6 HOURS
Status: DISCONTINUED | OUTPATIENT
Start: 2022-02-23 | End: 2022-02-24 | Stop reason: HOSPADM

## 2022-02-23 RX ORDER — HYDROMORPHONE HYDROCHLORIDE 1 MG/ML
1 INJECTION, SOLUTION INTRAMUSCULAR; INTRAVENOUS; SUBCUTANEOUS
Status: DISCONTINUED | OUTPATIENT
Start: 2022-02-23 | End: 2022-02-24 | Stop reason: HOSPADM

## 2022-02-23 RX ORDER — OXYCODONE HYDROCHLORIDE 5 MG/1
5 TABLET ORAL
Status: DISCONTINUED | OUTPATIENT
Start: 2022-02-23 | End: 2022-02-23

## 2022-02-23 RX ORDER — LIDOCAINE HYDROCHLORIDE 20 MG/ML
INJECTION, SOLUTION EPIDURAL; INFILTRATION; INTRACAUDAL; PERINEURAL PRN
Status: DISCONTINUED | OUTPATIENT
Start: 2022-02-23 | End: 2022-02-23 | Stop reason: SURG

## 2022-02-23 RX ORDER — LORAZEPAM 2 MG/ML
1 INJECTION INTRAMUSCULAR ONCE
Status: COMPLETED | OUTPATIENT
Start: 2022-02-23 | End: 2022-02-23

## 2022-02-23 RX ORDER — SODIUM CHLORIDE, SODIUM LACTATE, POTASSIUM CHLORIDE, CALCIUM CHLORIDE 600; 310; 30; 20 MG/100ML; MG/100ML; MG/100ML; MG/100ML
INJECTION, SOLUTION INTRAVENOUS CONTINUOUS
Status: DISCONTINUED | OUTPATIENT
Start: 2022-02-23 | End: 2022-02-23 | Stop reason: HOSPADM

## 2022-02-23 RX ORDER — MAGNESIUM HYDROXIDE 1200 MG/15ML
LIQUID ORAL
Status: COMPLETED | OUTPATIENT
Start: 2022-02-23 | End: 2022-02-23

## 2022-02-23 RX ORDER — BISACODYL 10 MG
10 SUPPOSITORY, RECTAL RECTAL
Status: DISCONTINUED | OUTPATIENT
Start: 2022-02-23 | End: 2022-02-24 | Stop reason: HOSPADM

## 2022-02-23 RX ORDER — KETOROLAC TROMETHAMINE 30 MG/ML
INJECTION, SOLUTION INTRAMUSCULAR; INTRAVENOUS PRN
Status: DISCONTINUED | OUTPATIENT
Start: 2022-02-23 | End: 2022-02-23 | Stop reason: SURG

## 2022-02-23 RX ORDER — HYDROMORPHONE HYDROCHLORIDE 1 MG/ML
0.1 INJECTION, SOLUTION INTRAMUSCULAR; INTRAVENOUS; SUBCUTANEOUS
Status: DISCONTINUED | OUTPATIENT
Start: 2022-02-23 | End: 2022-02-23 | Stop reason: HOSPADM

## 2022-02-23 RX ORDER — HYDROMORPHONE HYDROCHLORIDE 1 MG/ML
1 INJECTION, SOLUTION INTRAMUSCULAR; INTRAVENOUS; SUBCUTANEOUS ONCE
Status: COMPLETED | OUTPATIENT
Start: 2022-02-23 | End: 2022-02-23

## 2022-02-23 RX ORDER — HYDROMORPHONE HYDROCHLORIDE 1 MG/ML
0.5 INJECTION, SOLUTION INTRAMUSCULAR; INTRAVENOUS; SUBCUTANEOUS
Status: DISCONTINUED | OUTPATIENT
Start: 2022-02-23 | End: 2022-02-23

## 2022-02-23 RX ORDER — SODIUM CHLORIDE 9 MG/ML
INJECTION, SOLUTION INTRAVENOUS CONTINUOUS
Status: DISCONTINUED | OUTPATIENT
Start: 2022-02-23 | End: 2022-02-24 | Stop reason: HOSPADM

## 2022-02-23 RX ORDER — POLYETHYLENE GLYCOL 3350 17 G/17G
1 POWDER, FOR SOLUTION ORAL
Status: DISCONTINUED | OUTPATIENT
Start: 2022-02-23 | End: 2022-02-24 | Stop reason: HOSPADM

## 2022-02-23 RX ORDER — MIDAZOLAM HYDROCHLORIDE 1 MG/ML
1 INJECTION INTRAMUSCULAR; INTRAVENOUS
Status: DISCONTINUED | OUTPATIENT
Start: 2022-02-23 | End: 2022-02-23 | Stop reason: HOSPADM

## 2022-02-23 RX ORDER — ONDANSETRON 2 MG/ML
INJECTION INTRAMUSCULAR; INTRAVENOUS PRN
Status: DISCONTINUED | OUTPATIENT
Start: 2022-02-23 | End: 2022-02-23 | Stop reason: SURG

## 2022-02-23 RX ORDER — OXYCODONE HYDROCHLORIDE 5 MG/1
5 TABLET ORAL
Status: DISCONTINUED | OUTPATIENT
Start: 2022-02-23 | End: 2022-02-24 | Stop reason: HOSPADM

## 2022-02-23 RX ORDER — ACETAMINOPHEN 500 MG
1000 TABLET ORAL EVERY 6 HOURS PRN
Status: DISCONTINUED | OUTPATIENT
Start: 2022-02-28 | End: 2022-02-24 | Stop reason: HOSPADM

## 2022-02-23 RX ORDER — HYDROMORPHONE HYDROCHLORIDE 1 MG/ML
0.4 INJECTION, SOLUTION INTRAMUSCULAR; INTRAVENOUS; SUBCUTANEOUS
Status: DISCONTINUED | OUTPATIENT
Start: 2022-02-23 | End: 2022-02-23 | Stop reason: HOSPADM

## 2022-02-23 RX ORDER — OXYCODONE HYDROCHLORIDE 10 MG/1
10 TABLET ORAL
Status: DISCONTINUED | OUTPATIENT
Start: 2022-02-23 | End: 2022-02-24 | Stop reason: HOSPADM

## 2022-02-23 RX ORDER — OXYCODONE HCL 5 MG/5 ML
5 SOLUTION, ORAL ORAL
Status: COMPLETED | OUTPATIENT
Start: 2022-02-23 | End: 2022-02-23

## 2022-02-23 RX ORDER — AMOXICILLIN 250 MG
2 CAPSULE ORAL 2 TIMES DAILY
Status: DISCONTINUED | OUTPATIENT
Start: 2022-02-24 | End: 2022-02-24 | Stop reason: HOSPADM

## 2022-02-23 RX ORDER — NICOTINE 21 MG/24HR
14 PATCH, TRANSDERMAL 24 HOURS TRANSDERMAL
Status: DISCONTINUED | OUTPATIENT
Start: 2022-02-23 | End: 2022-02-24 | Stop reason: HOSPADM

## 2022-02-23 RX ORDER — ONDANSETRON 4 MG/1
4 TABLET, ORALLY DISINTEGRATING ORAL EVERY 4 HOURS PRN
Status: DISCONTINUED | OUTPATIENT
Start: 2022-02-23 | End: 2022-02-24 | Stop reason: HOSPADM

## 2022-02-23 RX ORDER — ONDANSETRON 2 MG/ML
4 INJECTION INTRAMUSCULAR; INTRAVENOUS EVERY 4 HOURS PRN
Status: DISCONTINUED | OUTPATIENT
Start: 2022-02-23 | End: 2022-02-24 | Stop reason: HOSPADM

## 2022-02-23 RX ORDER — METOCLOPRAMIDE HYDROCHLORIDE 5 MG/ML
INJECTION INTRAMUSCULAR; INTRAVENOUS PRN
Status: DISCONTINUED | OUTPATIENT
Start: 2022-02-23 | End: 2022-02-23 | Stop reason: SURG

## 2022-02-23 RX ORDER — PROMETHAZINE HYDROCHLORIDE 25 MG/1
12.5-25 SUPPOSITORY RECTAL EVERY 4 HOURS PRN
Status: DISCONTINUED | OUTPATIENT
Start: 2022-02-23 | End: 2022-02-24 | Stop reason: HOSPADM

## 2022-02-23 RX ORDER — LABETALOL HYDROCHLORIDE 5 MG/ML
10 INJECTION, SOLUTION INTRAVENOUS EVERY 4 HOURS PRN
Status: DISCONTINUED | OUTPATIENT
Start: 2022-02-23 | End: 2022-02-24 | Stop reason: HOSPADM

## 2022-02-23 RX ORDER — OXYCODONE HYDROCHLORIDE 5 MG/1
10 TABLET ORAL
Status: DISCONTINUED | OUTPATIENT
Start: 2022-02-23 | End: 2022-02-23

## 2022-02-23 RX ORDER — HALOPERIDOL 5 MG/ML
1 INJECTION INTRAMUSCULAR
Status: DISCONTINUED | OUTPATIENT
Start: 2022-02-23 | End: 2022-02-23 | Stop reason: HOSPADM

## 2022-02-23 RX ORDER — HYDROMORPHONE HYDROCHLORIDE 1 MG/ML
0.25 INJECTION, SOLUTION INTRAMUSCULAR; INTRAVENOUS; SUBCUTANEOUS
Status: DISCONTINUED | OUTPATIENT
Start: 2022-02-23 | End: 2022-02-23

## 2022-02-23 RX ORDER — KETOROLAC TROMETHAMINE 30 MG/ML
30 INJECTION, SOLUTION INTRAMUSCULAR; INTRAVENOUS EVERY 6 HOURS
Status: DISCONTINUED | OUTPATIENT
Start: 2022-02-23 | End: 2022-02-23

## 2022-02-23 RX ORDER — PROCHLORPERAZINE EDISYLATE 5 MG/ML
5-10 INJECTION INTRAMUSCULAR; INTRAVENOUS EVERY 4 HOURS PRN
Status: DISCONTINUED | OUTPATIENT
Start: 2022-02-23 | End: 2022-02-24 | Stop reason: HOSPADM

## 2022-02-23 RX ORDER — HYDROMORPHONE HYDROCHLORIDE 1 MG/ML
0.2 INJECTION, SOLUTION INTRAMUSCULAR; INTRAVENOUS; SUBCUTANEOUS
Status: DISCONTINUED | OUTPATIENT
Start: 2022-02-23 | End: 2022-02-23 | Stop reason: HOSPADM

## 2022-02-23 RX ORDER — OXYCODONE HCL 5 MG/5 ML
10 SOLUTION, ORAL ORAL
Status: COMPLETED | OUTPATIENT
Start: 2022-02-23 | End: 2022-02-23

## 2022-02-23 RX ORDER — SODIUM CHLORIDE, SODIUM LACTATE, POTASSIUM CHLORIDE, CALCIUM CHLORIDE 600; 310; 30; 20 MG/100ML; MG/100ML; MG/100ML; MG/100ML
INJECTION, SOLUTION INTRAVENOUS
Status: DISCONTINUED | OUTPATIENT
Start: 2022-02-23 | End: 2022-02-23 | Stop reason: SURG

## 2022-02-23 RX ORDER — MEPERIDINE HYDROCHLORIDE 25 MG/ML
12.5 INJECTION INTRAMUSCULAR; INTRAVENOUS; SUBCUTANEOUS
Status: DISCONTINUED | OUTPATIENT
Start: 2022-02-23 | End: 2022-02-23 | Stop reason: HOSPADM

## 2022-02-23 RX ORDER — DEXAMETHASONE SODIUM PHOSPHATE 4 MG/ML
INJECTION, SOLUTION INTRA-ARTICULAR; INTRALESIONAL; INTRAMUSCULAR; INTRAVENOUS; SOFT TISSUE PRN
Status: DISCONTINUED | OUTPATIENT
Start: 2022-02-23 | End: 2022-02-23 | Stop reason: SURG

## 2022-02-23 RX ADMIN — OXYCODONE 5 MG: 5 TABLET ORAL at 09:51

## 2022-02-23 RX ADMIN — LORAZEPAM 1 MG: 2 INJECTION INTRAMUSCULAR; INTRAVENOUS at 11:03

## 2022-02-23 RX ADMIN — DEXAMETHASONE SODIUM PHOSPHATE 4 MG: 4 INJECTION, SOLUTION INTRA-ARTICULAR; INTRALESIONAL; INTRAMUSCULAR; INTRAVENOUS; SOFT TISSUE at 14:04

## 2022-02-23 RX ADMIN — METOCLOPRAMIDE 10 MG: 5 INJECTION, SOLUTION INTRAMUSCULAR; INTRAVENOUS at 14:45

## 2022-02-23 RX ADMIN — PROPOFOL 200 MG: 10 INJECTION, EMULSION INTRAVENOUS at 14:04

## 2022-02-23 RX ADMIN — ACETAMINOPHEN 1000 MG: 500 TABLET ORAL at 09:51

## 2022-02-23 RX ADMIN — FENTANYL CITRATE 100 MCG: 50 INJECTION, SOLUTION INTRAMUSCULAR; INTRAVENOUS at 14:29

## 2022-02-23 RX ADMIN — SODIUM CHLORIDE: 9 INJECTION, SOLUTION INTRAVENOUS at 07:05

## 2022-02-23 RX ADMIN — DEXAMETHASONE SODIUM PHOSPHATE 4 MG: 4 INJECTION, SOLUTION INTRA-ARTICULAR; INTRALESIONAL; INTRAMUSCULAR; INTRAVENOUS; SOFT TISSUE at 14:29

## 2022-02-23 RX ADMIN — ACETAMINOPHEN 1000 MG: 500 TABLET ORAL at 23:33

## 2022-02-23 RX ADMIN — VANCOMYCIN HYDROCHLORIDE 1500 MG: 500 INJECTION, POWDER, LYOPHILIZED, FOR SOLUTION INTRAVENOUS at 04:50

## 2022-02-23 RX ADMIN — ONDANSETRON 4 MG: 2 INJECTION INTRAMUSCULAR; INTRAVENOUS at 14:45

## 2022-02-23 RX ADMIN — OXYCODONE HYDROCHLORIDE 10 MG: 5 SOLUTION ORAL at 15:13

## 2022-02-23 RX ADMIN — NICOTINE 14 MG: 14 PATCH TRANSDERMAL at 07:05

## 2022-02-23 RX ADMIN — KETOROLAC TROMETHAMINE 30 MG: 30 INJECTION, SOLUTION INTRAMUSCULAR at 14:45

## 2022-02-23 RX ADMIN — FENTANYL CITRATE 100 MCG: 50 INJECTION, SOLUTION INTRAMUSCULAR; INTRAVENOUS at 14:04

## 2022-02-23 RX ADMIN — LIDOCAINE HYDROCHLORIDE 100 MG: 20 INJECTION, SOLUTION EPIDURAL; INFILTRATION; INTRACAUDAL at 14:04

## 2022-02-23 RX ADMIN — SODIUM CHLORIDE, POTASSIUM CHLORIDE, SODIUM LACTATE AND CALCIUM CHLORIDE: 600; 310; 30; 20 INJECTION, SOLUTION INTRAVENOUS at 14:01

## 2022-02-23 RX ADMIN — FENTANYL CITRATE 100 MCG: 50 INJECTION, SOLUTION INTRAMUSCULAR; INTRAVENOUS at 14:39

## 2022-02-23 RX ADMIN — PROPOFOL 200 MG: 10 INJECTION, EMULSION INTRAVENOUS at 14:29

## 2022-02-23 RX ADMIN — SODIUM CHLORIDE: 9 INJECTION, SOLUTION INTRAVENOUS at 23:34

## 2022-02-23 RX ADMIN — MIDAZOLAM 1 MG: 1 INJECTION INTRAMUSCULAR; INTRAVENOUS at 15:23

## 2022-02-23 RX ADMIN — FENTANYL CITRATE 50 MCG: 50 INJECTION, SOLUTION INTRAMUSCULAR; INTRAVENOUS at 15:14

## 2022-02-23 RX ADMIN — FENTANYL CITRATE 50 MCG: 50 INJECTION, SOLUTION INTRAMUSCULAR; INTRAVENOUS at 15:22

## 2022-02-23 RX ADMIN — HYDROMORPHONE HYDROCHLORIDE 1 MG: 1 INJECTION, SOLUTION INTRAMUSCULAR; INTRAVENOUS; SUBCUTANEOUS at 11:04

## 2022-02-23 RX ADMIN — IOHEXOL 80 ML: 350 INJECTION, SOLUTION INTRAVENOUS at 05:00

## 2022-02-23 ASSESSMENT — ENCOUNTER SYMPTOMS
VOMITING: 0
HEARTBURN: 0
BLURRED VISION: 0
DOUBLE VISION: 0
BRUISES/BLEEDS EASILY: 0
HEMOPTYSIS: 0
PALPITATIONS: 0
MYALGIAS: 0
NAUSEA: 0
COUGH: 0
FEVER: 0
MYALGIAS: 1
DEPRESSION: 0
DIZZINESS: 0
HEADACHES: 0
TINGLING: 0
NECK PAIN: 0
NERVOUS/ANXIOUS: 1
CHILLS: 0

## 2022-02-23 ASSESSMENT — COGNITIVE AND FUNCTIONAL STATUS - GENERAL
SUGGESTED CMS G CODE MODIFIER DAILY ACTIVITY: CH
MOBILITY SCORE: 24
SUGGESTED CMS G CODE MODIFIER MOBILITY: CH
DAILY ACTIVITIY SCORE: 24

## 2022-02-23 ASSESSMENT — LIFESTYLE VARIABLES
HOW MANY TIMES IN THE PAST YEAR HAVE YOU HAD 5 OR MORE DRINKS IN A DAY: 0
DO YOU DRINK ALCOHOL: NO
HAVE YOU EVER FELT YOU SHOULD CUT DOWN ON YOUR DRINKING: NO
TOTAL SCORE: 0
EVER HAD A DRINK FIRST THING IN THE MORNING TO STEADY YOUR NERVES TO GET RID OF A HANGOVER: NO
DOES PATIENT WANT TO STOP DRINKING: NO
EVER FELT BAD OR GUILTY ABOUT YOUR DRINKING: NO
TOTAL SCORE: 0
HAVE PEOPLE ANNOYED YOU BY CRITICIZING YOUR DRINKING: NO
SUBSTANCE_ABUSE: 1
TOTAL SCORE: 0
ON A TYPICAL DAY WHEN YOU DRINK ALCOHOL HOW MANY DRINKS DO YOU HAVE: 0
CONSUMPTION TOTAL: NEGATIVE
AVERAGE NUMBER OF DAYS PER WEEK YOU HAVE A DRINK CONTAINING ALCOHOL: 0

## 2022-02-23 ASSESSMENT — PAIN DESCRIPTION - PAIN TYPE
TYPE: SURGICAL PAIN
TYPE: ACUTE PAIN
TYPE: SURGICAL PAIN

## 2022-02-23 NOTE — OR NURSING
1452- Pt arrives to PACU from OR on 6L of oxygen via mask. Report received. Dressing CDI.     1511- CXR for central line completed.     1524- Pt restless, trying to get out of gurney, order received for Versed.    1701- Pt ambulated to bathroom with steady gait.     1924- Report called to Larry GEE. POC discussed.     1955- Pt taken to room by transport tech. Pt comfortable and dressing CDI upon leaving PACU.

## 2022-02-23 NOTE — H&P
Hospital Medicine History & Physical Note    Date of Service  2/23/2022    Primary Care Physician  Pcp Pt States None    Consultants  orthopedics    Specialist Names:      Code Status  Full Code    Chief Complaint  Chief Complaint   Patient presents with   • Abscess     Pt has 2 abscesses to R upper thigh and R hip x 1-2 weeks. Pt admits to injecting heroin and fentanyl via IM. Pt reports she has tried to drain the areas using razor blades.  Abscesses appear swollen and red       History of Presenting Illness  Luna River is a 32 year old female with past medical history of IV drug abuse heroin, occasional methamphetamine abuse, history of MRSA in the past who presented 2/23/2022 with right upper thigh and right hip pain for the last 1 to 2 weeks.  She rates the pain as 8 out of 10 intensity, sharp, non-radiating. No relieving or exacerbating factors are noted. She reports associated fever, chills and generalized fatigue.     In the ER, CT pelvis done showed multilocular enhancing fluid collection in the right lateral subcutaneous soft tissues most compatible with abscess and surrounding cellulitis.  Patient met criteria for sepsis and was started on IV vancomycin.  Orthopedics was consulted will take patient to the OR.    I discussed the plan of care with patient.    Review of Systems  Review of Systems   Constitutional: Negative for chills and fever.   HENT: Negative for hearing loss and tinnitus.    Eyes: Negative for blurred vision and double vision.   Respiratory: Negative for cough and hemoptysis.    Cardiovascular: Negative for chest pain and palpitations.   Gastrointestinal: Negative for heartburn, nausea and vomiting.   Genitourinary: Negative for dysuria and urgency.   Musculoskeletal: Negative for myalgias and neck pain.        Right hip and thigh pain   Skin: Negative for itching and rash.   Neurological: Negative for dizziness, tingling and headaches.   Endo/Heme/Allergies: Negative for  environmental allergies. Does not bruise/bleed easily.   Psychiatric/Behavioral: Positive for substance abuse. Negative for depression and suicidal ideas.       Past Medical History   has a past medical history of Anorexia, Bipolar 2 disorder (HCC), bulemia, Hepatitis C, Hepatitis C, Impetigo, Liver disease, Major depressive disorder, Mood disorder (HCC), MRSA infection, Oppositional defiant disorder, Polysubstance abuse (HCC), Post-traumatic stress disorder, Schizoaffective disorder, and Scoliosis.    Surgical History   has a past surgical history that includes tonsillectomy; pr removal of tonsils,<13 y/o; other; and anthony by laparoscopy (2/1/2013).     Family History  Reviewed not pertinent  Family history reviewed with patient. There is no family history that is pertinent to the chief complaint.     Social History   reports that she has been smoking cigarettes. She has a 3.50 pack-year smoking history. She has never used smokeless tobacco. She reports current alcohol use. She reports current drug use. Drugs: Intravenous and Injected (Skin Popping).    Allergies  Allergies   Allergen Reactions   • Clonazepam      Pt states that she overdosed on it before. Furthermore, per historical, pt had seizure-like activity while taking Clonazepam as a child.   • Gabadone      Unknown reaction.   • Gabapentin      Causes seizures.   • Tiagabine Hydrochloride      Unknown reaction.       Medications  Prior to Admission Medications   Prescriptions Last Dose Informant Patient Reported? Taking?   Naloxone (NARCAN) 4 MG/0.1ML Liquid   No No   Sig: Administer a single spray of NARCAN Nasal [place] 4 mg/0.1 mL intranasally into one nostril, call 911 for emergency medical assistance.      Facility-Administered Medications: None       Physical Exam  Temp:  [36.6 °C (97.9 °F)] 36.6 °C (97.9 °F)  Pulse:  [] 86  Resp:  [16] 16  BP: (109-142)/(71-92) 114/77  SpO2:  [96 %-98 %] 96 %  Blood Pressure: 114/77   Temperature: 36.6 °C (97.9  °F)   Pulse: 86   Respiration: 16   Pulse Oximetry: 96 %       Physical Exam  Vitals and nursing note reviewed.   Constitutional:       Comments: Poor hygiene   HENT:      Head: Normocephalic and atraumatic.      Right Ear: Tympanic membrane normal.      Left Ear: Tympanic membrane normal.      Nose: Nose normal.      Mouth/Throat:      Mouth: Mucous membranes are moist.      Pharynx: Oropharynx is clear.   Eyes:      Extraocular Movements: Extraocular movements intact.      Pupils: Pupils are equal, round, and reactive to light.   Cardiovascular:      Rate and Rhythm: Normal rate and regular rhythm.      Pulses: Normal pulses.      Heart sounds: Normal heart sounds.   Pulmonary:      Effort: Pulmonary effort is normal.      Breath sounds: Normal breath sounds.   Abdominal:      General: Bowel sounds are normal. There is no distension.      Palpations: Abdomen is soft. There is no mass.      Tenderness: There is no abdominal tenderness.      Hernia: No hernia is present.   Musculoskeletal:         General: Normal range of motion.      Cervical back: Neck supple.      Comments: Right hip and thigh area of induration with erythema and tenderness.   Skin:     General: Skin is warm.      Capillary Refill: Capillary refill takes less than 2 seconds.   Neurological:      General: No focal deficit present.      Mental Status: She is alert. Mental status is at baseline.   Psychiatric:         Mood and Affect: Mood normal.         Behavior: Behavior normal.         Laboratory:  Recent Labs     02/23/22  0355   WBC 14.4*   RBC 5.02   HEMOGLOBIN 14.5   HEMATOCRIT 41.8   MCV 83.3   MCH 28.9   MCHC 34.7   RDW 45.7   PLATELETCT 440   MPV 10.3     Recent Labs     02/23/22  0448   SODIUM 141   POTASSIUM 3.9   CHLORIDE 102   CO2 25   GLUCOSE 87   BUN 15   CREATININE 0.61   CALCIUM 9.3     Recent Labs     02/23/22  0448   ALTSGPT 14   ASTSGOT 15   ALKPHOSPHAT 121*   TBILIRUBIN 0.4   GLUCOSE 87       Imaging:  CT-PELVIS WITH   Final  Result         1.  Multiloculated enhancing fluid collection in the right lateral subcutaneous soft tissues, appearance most compatible in appearance with abscess and surrounding cellulitis.   2.  Right inguinal adenopathy, may represent reactive adenopathy given nearby abscess. Consider other causes of adenopathy with additional workup as clinically appropriate.   3.  Subcutaneous fat stranding in the anterolateral left thigh suggests component of cellulitis.          no X-Ray or EKG requiring interpretation    Assessment/Plan:  I anticipate this patient will require at least two midnights for appropriate medical management, necessitating inpatient admission.    * Sepsis due to cellulitis (HCC)- (present on admission)  Assessment & Plan  This is Sepsis Present on admission  SIRS criteria identified on my evaluation include: Tachycardia, with heart rate greater than 90 BPM and Leukocytosis, with WBC greater than 12,000  Source is right lateral subcutaneous soft tissues abscess and cellulitis  Sepsis protocol initiated  Fluid resuscitation ordered per protocol  IV antibiotics as appropriate for source of sepsis  While organ dysfunction may be noted elsewhere in this problem list or in the chart, degree of organ dysfunction does not meet CMS criteria for severe sepsis    Follow blood cultures  IV vancomycin  Orthopedics on board for OR        Tobacco abuse  Assessment & Plan  Nicotine patch protocol    Cutaneous abscess- (present on admission)  Assessment & Plan  CT pelvis showing multiloculated and sequential right lateral subcu soft tissues  N.p.o.  IR consulted will take patient to the OR  Continue with IV vancomycin      IV drug abuse (HCC)- (present on admission)  Assessment & Plan  Has history of IV heroin and fentanyl abuse  Reports occasional methamphetamine abuse  Reports having history of MRSA in the past  Echo      VTE prophylaxis: enoxaparin ppx

## 2022-02-23 NOTE — ANESTHESIA PREPROCEDURE EVALUATION
Case: [id number] Anesthesia Start Date/Time: 02/23/22 1401    Procedure: IRRIGATION AND DEBRIDEMENT, WOUND- RIGHT THIGH AND BUTTOCK (Right Buttocks)    Anesthesia type: General    Pre-op diagnosis: Right buttock and thigh abscess    Location: [facility] OR 16 / SURGERY [facility]    Surgeons: Dakota Cortes M.D.      Bipolar 2    Relevant Problems   Other   (positive) Abnormal LFTs   (positive) Cutaneous abscess   (positive) Hepatitis C antibody test positive   (positive) IV drug abuse (HCC)   (positive) Sepsis due to cellulitis (HCC)   (positive) Tobacco abuse       Physical Exam    Airway   Mallampati: II  TM distance: <3 FB  Neck ROM: full       Cardiovascular - normal exam  Rhythm: regular  Rate: normal  (-) murmur     Dental       Very poor dentition   Pulmonary - normal exam  Breath sounds clear to auscultation     Abdominal    Neurological - normal exam                 Anesthesia Plan    ASA 3   ASA physical status 3 criteria: alcohol and/or substance dependence or abuse    Plan - general       Airway plan will be LMA          Induction: intravenous    Postoperative Plan: Postoperative administration of opioids is intended.    Pertinent diagnostic labs and testing reviewed    Informed Consent:    Anesthetic plan and risks discussed with patient.

## 2022-02-23 NOTE — ANESTHESIA PROCEDURE NOTES
Airway    Date/Time: 2/23/2022 2:30 PM  Performed by: Estevan Rodriguez M.D.  Authorized by: Estevan Rodriguez M.D.     Location:  OR  Urgency:  Elective  Indications for Airway Management:  Anesthesia      Spontaneous Ventilation: absent    Sedation Level:  Deep  Preoxygenated: Yes    Final Airway Type:  Supraglottic airway  Final Supraglottic Airway:  Standard LMA    SGA Size:  4  Number of Attempts at Approach:  1

## 2022-02-23 NOTE — OP REPORT
DATE OF OPERATION: 2/23/2022     PREOPERATIVE DIAGNOSIS: Right buttock and right thigh abscesses    POSTOPERATIVE DIAGNOSIS: Same    PROCEDURE PERFORMED: Incision and drainage of right buttock abscess incision and drainage right thigh abscess    SURGEON: Dakota Cortes M.D.     ASSISTANT: Yannick Capone MD    ANESTHESIA: General    SPECIMEN: None    ESTIMATED BLOOD LOSS: 5 mL    IMPLANTS: None      INDICATIONS: The patient is a 32 y.o. female who presented with right buttock and thigh abscesses that developed after injection drug use.  CT scan showed these fluid collections and the patient had presented with sepsis consistent with these abscesses.  Recommended debridement of these and antibiotics.  I discussed the risks and benefits of the procedure which include but are not limited to risks of infection, wound healing complication, possible need for additional procedures such as repeat debridement, and the medical risks of anesthesia including MI, stroke, and death.  Alternatives to surgery were also discussed, including non-operative management, which I did not recommend.  The patient was in agreement with the plan to proceed, and the informed consent was signed and documented.  I met with the patient pre-operatively and marked the operative extremity with their agreement.  We proceeded to the operating room.     DESCRIPTION OF PROCEDURE:  Patient was seen in the preoperative holding area on the day of surgery. The operative site was marked with my initials.  she was taken to the operating room and placed supine on the operative table.  Anesthesia was induced.  The operative extremity was prepped and draped in the normal sterile fashion.  Operative pause was conducted and the correct patient, site, side, procedure, and surgeon's initials on extremity were identified.  The thigh abscess was addressed first.  An incision was made centered over the area of fluctuance.  There is malodorous purulent drainage that  came from this and this extended down to the level of the fascia.  Loculations were broken up with a Metzenbaum scissor.  The area was thoroughly evacuated.  Rongeur was used to remove any loose nonviable necrotic fat.  The area was then thoroughly irrigated with normal saline until the fluid had a normal appearance to it.  A sample of this had been sent for culture.  Next attention was turned to the buttock abscess.  The right buttock ulcer showed an area of fluctuance that was consistent was seen on the CT scan.  Incision was made over this area of fluctuance and again this abscess carried down to the level of the fascia and possibly into the muscle based off the myositis seen on the imaging.  This irrigant was thoroughly evacuated and then debrided with a rongeur removing the loose nonviable tissue.  After the irrigation both wounds had a much healthier appearance erythema in the surrounding tissues already improving.  The soft tissues were reapproximated with a 2-0 nylon suture.  This was closed loosely to allow the wound to continue to drain if needed to.  Sterile dressings were applied and the patient was allowed to wake in the operating room taken to PACU in stable condition    POSTOPERATIVE PLAN: As tolerated weight bearing.  Twice daily dressing changes with wet-to-dry dressings.  Sutures out in 2 to 3 weeks.  Antibiotics based on cultures.  Mobilize.    ____________________________________   Dakota Cortes M.D.   DD: 2/23/2022  2:47 PM

## 2022-02-23 NOTE — ED TRIAGE NOTES
"Chief Complaint   Patient presents with   • Abscess     Pt has 2 abscesses to R upper thigh and R hip x 1-2 weeks. Pt admits to injecting heroin and fentanyl via IM. Pt reports she has tried to drain the areas using razor blades.  Abscesses appear swollen and red       Pt to triage with steady gait for above complaint.     Pt back to lobby, educated on triage process and encourage to alert staff of any changes.     /92   Pulse (!) 118   Temp 36.6 °C (97.9 °F) (Temporal)   Resp 16   Ht 1.753 m (5' 9\")   Wt 55.7 kg (122 lb 12.7 oz)   SpO2 97%   BMI 18.13 kg/m²     "

## 2022-02-23 NOTE — DISCHARGE PLANNING
2/23/2022  ED Community Health Worker Intake  • Social determinates of health intake complete.   • Identified barriers to transportation.  • Has PCP appointment scheduled for 3.11.2022@3:30pm.  • Patient will be scheduled at [address] [place] [address] for primary care.  • Has transportation to appointment: Yes via [place]  • Inpatient/Outpatient assessment completed.  • Contact information provided to [place].    CHW met with patient bedside to discuss PCP follow up appointment and introduce Community Care Management services. Patient states that she is currently not established with a PCP. CHW asked patient if she would go to follow up appointment if it was made. Patient shrugged shoulders and stated 'I guess.' CHW asked patient how she would get to appointment. Patient stated that she didn't know. CHW informed patient of [place] benefits through her insurance. Patient advised of MT contact information via AVS. Patient was scheduled at [facility] on 3.11.2022 at 3:30pm with Dr Bailon. CHW informed patient of date, time, and location of appointment via AVS. Patient stated that she was not needing any assistance with food and hosing at the moment. CHW will no longer follow.

## 2022-02-23 NOTE — CONSULTS
Date of Service: 02/23/22    Luna River was seen today in consultation for right buttock and thigh abscess at the request of Dr. Negrete    CC: Right thigh and buttock abscess    HPI: Luna River is a 32 y.o. female who presents with complaints of pain to right buttock and thigh.  This started over the last 2 weeks after injection drug use.  She has a history of heroin and methamphetamine use.  She states she has had other abscesses in the past that have required surgical treatment.  The pain is 5/10 and is described as sharp.  The pain is made worse by palpation of the area and made better by rest and immobilization.    PMH:   Past Medical History:   Diagnosis Date   • Anorexia    • Bipolar 2 disorder (HCC)    • bulemia    • Hepatitis C    • Hepatitis C    • Impetigo    • Liver disease    • Major depressive disorder    • Mood disorder (HCC)    • MRSA infection    • Oppositional defiant disorder    • Polysubstance abuse (HCC)    • Post-traumatic stress disorder    • Schizoaffective disorder    • Scoliosis        PSH:   Past Surgical History:   Procedure Laterality Date   • YONATAN BY LAPAROSCOPY  2/1/2013    Performed by Yves Rooney M.D. at SURGERY [facility] ORS   • OTHER      dental   • NC REMOVAL OF TONSILS,<15 Y/O     • TONSILLECTOMY         FH: History reviewed. No pertinent family history.    SH:   Social History     Socioeconomic History   • Marital status: Single     Spouse name: Not on file   • Number of children: Not on file   • Years of education: Not on file   • Highest education level: Not on file   Occupational History   • Not on file   Tobacco Use   • Smoking status: Current Every Day Smoker     Packs/day: 0.50     Years: 7.00     Pack years: 3.50     Types: Cigarettes   • Smokeless tobacco: Never Used   Vaping Use   • Vaping Use: Never used   Substance and Sexual Activity   • Alcohol use: Yes     Comment: rarely   • Drug use: Yes     Types: Intravenous, Injected (Skin Popping)     " Comment: Heroin, fentanyl, meth   • Sexual activity: Not on file   Other Topics Concern   • Not on file   Social History Narrative   • Not on file     Social Determinants of Health     Financial Resource Strain: Not on file   Food Insecurity: Not on file   Transportation Needs: Not on file   Physical Activity: Not on file   Stress: Not on file   Social Connections: Not on file   Intimate Partner Violence: Not on file   Housing Stability: Not on file       ROS: In review of the following systems: Constitutional, Eyes, ENT, Cardiovascular,Respiratory, GI, , Musculoskeletal, Skin, Neuro, Psych, Hematologic, Endocrine, Allergic; no pertinent findings were found related to the chief complaint and orthopedic injury     /72   Pulse 88   Temp 36.6 °C (97.9 °F) (Temporal)   Resp 16   Ht 1.753 m (5' 9\")   Wt 55.7 kg (122 lb 12.7 oz)   SpO2 96%     Physical Exam:  General: Disheveled appearance appears older than stated age  HEENT: Normocephalic, atraumatic  Psych: Somnolent and flat affect  Neck: Supple, no pain to motion  Chest/Pulmonary: breathing unlabored, no audible wheezing  Cardio: Regular heart rate and rhythm  Neuro: Sensation grossly intact to BUE and BLE, moving all four extremities  Skin: Intact with no full thickness abrasions or lacerations  MSK: Tenderness directly over the fluctuance in the right thigh and right buttock.  Surrounding erythema.  Normal sensation distally in the leg and foot.    Imaging and labs: CT scan of the pelvis showed an area of fluctuance within the right buttock extending down to the fascia and muscle.    Recent Labs     02/23/22  0355   WBC 14.4*   RBC 5.02   HEMOGLOBIN 14.5   HEMATOCRIT 41.8   MCV 83.3   MCH 28.9   RDW 45.7   PLATELETCT 440   MPV 10.3   NEUTSPOLYS 71.70   LYMPHOCYTES 21.80*   MONOCYTES 5.80   EOSINOPHILS 0.10   BASOPHILS 0.10       Assessment:   1. Abscess     2. Febrile illness, acute     3. Cellulitis of buttock         We discussed the diagnosis and " findings with the patient at length.  We reviewed possible non operative and operative interventions and the risks and benefits of each of these.  she had a chance to ask questions and all of these were answered to her satisfaction.        Plan:  N.p.o.  I&D of right thigh and buttock abscess  Cultures  Antibiotics  Wound care  Sutures out in clinic in 2 to 3 weeks

## 2022-02-23 NOTE — ASSESSMENT & PLAN NOTE
Has history of IV heroin and fentanyl abuse  Reports occasional methamphetamine abuse  Reports having history of MRSA in the past  Echo

## 2022-02-23 NOTE — ED PROVIDER NOTES
ED Provider Note    Scribed for Imtiaz Coronado M.D. by Garcia Moody. 2/23/2022  3:33 AM    Primary care provider: Pcp Pt States None  Means of arrival: Walk-in  History obtained from: Patient  History limited by: None    CHIEF COMPLAINT  Chief Complaint   Patient presents with   • Abscess     Pt has 2 abscesses to R upper thigh and R hip x 1-2 weeks. Pt admits to injecting heroin and fentanyl via IM. Pt reports she has tried to drain the areas using razor blades.  Abscesses appear swollen and red       HPI  Luna River is a 32 y.o. female who presents to the Emergency Department for evaluation of several abscesses to her right thigh and hip. These have been present for about two weeks and she has attempted to drain the areas herself. This has happened in the past as well. She has been injecting heroin and fentanyl IM and last used in this area several days ago. She believes that she has had some fevers as well. No vomiting or diarrhea.    REVIEW OF SYSTEMS  Pertinent positives include: abscess and fever. Pertinent negatives include: no vomiting or diarrhea. See history of present illness. All other systems are negative.     PAST MEDICAL HISTORY   has a past medical history of Anorexia, Bipolar 2 disorder (HCC), bulemia, Hepatitis C, Hepatitis C, Impetigo, Liver disease, Major depressive disorder, Mood disorder (HCC), MRSA infection, Oppositional defiant disorder, Polysubstance abuse (HCC), Post-traumatic stress disorder, Schizoaffective disorder, and Scoliosis.    SURGICAL HISTORY   has a past surgical history that includes tonsillectomy; removal of tonsils,<15 y/o; other; and anthony by laparoscopy (2/1/2013).    SOCIAL HISTORY  Social History     Tobacco Use   • Smoking status: Current Every Day Smoker     Packs/day: 0.50     Years: 7.00     Pack years: 3.50     Types: Cigarettes   • Smokeless tobacco: Never Used   Vaping Use   • Vaping Use: Never used   Substance Use Topics   • Alcohol use: Yes      "Comment: rarely   • Drug use: Yes     Types: Intravenous, Injected (Skin Popping)     Comment: Heroin, fentanyl, meth      Social History     Substance and Sexual Activity   Drug Use Yes   • Types: Intravenous, Injected (Skin Popping)    Comment: Heroin, fentanyl, meth       FAMILY HISTORY  History reviewed. No pertinent family history.    CURRENT MEDICATIONS  Home Medications     Reviewed by Christina Bailey R.N. (Registered Nurse) on 02/23/22 at 0253  Med List Status: <None>   Medication Last Dose Status   Naloxone (NARCAN) 4 MG/0.1ML Liquid  Active                ALLERGIES  Allergies   Allergen Reactions   • Clonazepam      Pt states that she overdosed on it before. Furthermore, per historical, pt had seizure-like activity while taking Clonazepam as a child.   • Gabadone      Unknown reaction.   • Gabapentin      Causes seizures.   • Tiagabine Hydrochloride      Unknown reaction.       PHYSICAL EXAM  VITAL SIGNS: /92   Pulse (!) 118   Temp 36.6 °C (97.9 °F) (Temporal)   Resp 16   Ht 1.753 m (5' 9\")   Wt 55.7 kg (122 lb 12.7 oz)   SpO2 97%   BMI 18.13 kg/m²     Constitutional: Alert in no apparent distress.  HENT: No signs of trauma, Bilateral external ears normal, Nose normal. Uvula midline.   Eyes: Pupils are equal and reactive, Conjunctiva normal, Non-icteric.   Neck: Normal range of motion, No tenderness, Supple, No stridor.   Lymphatic: No lymphadenopathy noted.   Cardiovascular: Regular rate and rhythm, no murmurs.   Thorax & Lungs: Normal breath sounds, No respiratory distress, No wheezing, No chest tenderness.   Abdomen:  Soft, No tenderness, No peritoneal signs, No masses, No pulsatile masses.   Skin: Warm, Dry, No erythema, No rash.   Back: No bony tenderness, No CVA tenderness.   Extremities: Intact distal pulses, No edema, No tenderness, No cyanosis.  Musculoskeletal: Good range of motion in all major joints. No tenderness to palpation or major deformities noted. 2 areas of abscesses about " "5 cm by 4.5 cm, one to medial aspect of right thigh and the other on the right superior gluteal cleft. No obvious drainage. Erythema present  Neurologic: Alert , Normal motor function, Normal sensory function, No focal deficits noted.   Psychiatric: Affect normal, Judgment normal, Mood normal.     DIAGNOSTIC STUDIES / PROCEDURES    LABS  Labs Reviewed   CBC WITH DIFFERENTIAL - Abnormal; Notable for the following components:       Result Value    WBC 14.4 (*)     Lymphocytes 21.80 (*)     Neutrophils (Absolute) 10.33 (*)     All other components within normal limits   COMP METABOLIC PANEL - Abnormal; Notable for the following components:    Alkaline Phosphatase 121 (*)     Globulin 3.6 (*)     All other components within normal limits    Narrative:     SPECIMEN IS A RECOLLECT  hemolyzed   LACTIC ACID   HCG QUAL SERUM   ESTIMATED GFR    Narrative:     SPECIMEN IS A RECOLLECT  hemolyzed   BLOOD CULTURE    Narrative:     1 of 2 for Blood Culture x 2 sites order. Per Hospital  Policy: Only change Specimen Src: to \"Line\" if specified by  physician order.   BLOOD CULTURE    Narrative:     2 of 2 blood culture x2  Sites order. Per Hospital Policy:  Only change Specimen Src: to \"Line\" if specified by physician  order.   SARS-COV ANTIGEN MAYUR   LACTIC ACID   MRSA BY PCR (AMP)   URINE DRUG SCREEN      All labs reviewed by me.    RADIOLOGY  CT-PELVIS WITH   Final Result         1.  Multiloculated enhancing fluid collection in the right lateral subcutaneous soft tissues, appearance most compatible in appearance with abscess and surrounding cellulitis.   2.  Right inguinal adenopathy, may represent reactive adenopathy given nearby abscess. Consider other causes of adenopathy with additional workup as clinically appropriate.   3.  Subcutaneous fat stranding in the anterolateral left thigh suggests component of cellulitis.      EC-ECHOCARDIOGRAM COMPLETE W/O CONT    (Results Pending)     The radiologist's interpretation of all " radiological studies have been reviewed by me.    COURSE & MEDICAL DECISION MAKING  Nursing notes, VS, PMSFHx reviewed in chart.    32 y.o. female p/w chief complaint of abscesses to her right thigh and hip.    3:33 AM Patient seen and examined at bedside.      I verified that the patient was wearing a mask and I was wearing appropriate PPE every time I entered the room. The patient's mask was on the patient at all times during my encounter except for a brief view of the oropharynx.     The differential diagnoses include but are not limited to:     Hx and PE c/w abscess  Pt w/ no crepitus or tissue necrosis to suggest concern for Necrotizing fascitis at this time  Cannot rule out osteonecrosis at this time  CT obtained to evaluate extent of multiple abscesses and to ensure no intra-abdominal spread or joint spread.  Pt started on Vancomycin    5:50 AM I discussed the patient's case and the above findings with Dr. Cortes (Ortho) who agrees to evaluate the patient for potential surgery today and drainage of abscess.     I discussed the patient's case and the above findings with Dr. Pollard (Hospitalist) who agrees to evaluate the patient for hospitalization.    DISPOSITION:  Patient will be hospitalized by Dr. Pollard in guarded condition.    FINAL IMPRESSION  1. Abscess    2. Febrile illness, acute    3. Cellulitis of buttock        IGarcia (Danyell), am scribing for, and in the presence of, Imtiaz Coronado M.D..    Electronically signed by: Garcia Moody (Danyell), 2/23/2022    IImtiaz M.D. personally performed the services described in this documentation, as scribed by Garcia Moody in my presence, and it is both accurate and complete.    The note accurately reflects work and decisions made by me.  Imtiaz Coronado M.D.  2/23/2022  6:43 AM

## 2022-02-23 NOTE — ASSESSMENT & PLAN NOTE
This is Sepsis Present on admission  SIRS criteria identified on my evaluation include: Tachycardia, with heart rate greater than 90 BPM and Leukocytosis, with WBC greater than 12,000  Source is right lateral subcutaneous soft tissues abscess and cellulitis  Sepsis protocol initiated  Fluid resuscitation ordered per protocol  IV antibiotics as appropriate for source of sepsis  While organ dysfunction may be noted elsewhere in this problem list or in the chart, degree of organ dysfunction does not meet CMS criteria for severe sepsis    Follow blood cultures  IV vancomycin  Orthopedics on board for OR

## 2022-02-23 NOTE — PROGRESS NOTES
Hospital Medicine Daily Progress Note    Date of Service  2/23/2022    Chief Complaint  Luna River is a 32 y.o. female admitted 2/23/2022 with abscess    Hospital Course  No notes on file    Interval Problem Update  - admitted this am  -Started on vancomycin, however MRSA nares negative however apparently does have h/o MRSA in past  -Going for surgery this afternoon for I&D  -Starting to withdrawal becoming very anxious wanting to leave AMA, gave dose of Dilaudid and Ativan with improvement in symptoms    I have personally seen and examined the patient at bedside. I discussed the plan of care with patient and bedside RN.    Consultants/Specialty  orthopedics    Code Status  Full Code    Disposition  Patient is not medically cleared for discharge.   Anticipate discharge to to home with close outpatient follow-up.  I have placed the appropriate orders for post-discharge needs.    Review of Systems  Review of Systems   Musculoskeletal: Positive for myalgias.   Psychiatric/Behavioral: The patient is nervous/anxious.         Physical Exam  Temp:  [36.6 °C (97.9 °F)] 36.6 °C (97.9 °F)  Pulse:  [] 88  Resp:  [16] 16  BP: (109-142)/(70-92) 132/72  SpO2:  [95 %-98 %] 96 %    Physical Exam  Vitals and nursing note reviewed.   Constitutional:       General: She is in acute distress (crying, in active opiate WD, rocking back and forth).      Appearance: She is ill-appearing and diaphoretic. She is not toxic-appearing.   HENT:      Head: Normocephalic and atraumatic.      Nose: Nose normal.      Mouth/Throat:      Mouth: Mucous membranes are moist.      Pharynx: Oropharynx is clear.   Eyes:      General: No scleral icterus.     Conjunctiva/sclera: Conjunctivae normal.   Cardiovascular:      Rate and Rhythm: Regular rhythm. Tachycardia present.      Heart sounds: No murmur heard.    No friction rub.   Pulmonary:      Effort: Pulmonary effort is normal.      Breath sounds: Normal breath sounds.   Abdominal:       General: Bowel sounds are normal. There is no distension.      Palpations: Abdomen is soft.      Tenderness: There is no abdominal tenderness.   Musculoskeletal:      Cervical back: Normal range of motion.   Skin:     Findings: Bruising (scattered bruising to lower extremities) and erythema (erythematous areas of induration on R thigh and hip) present.      Comments: Scattered hyperpigmented scars on b/l lower extremities   Neurological:      Mental Status: She is alert and oriented to person, place, and time.   Psychiatric:         Mood and Affect: Mood is anxious.         Behavior: Behavior is agitated. Behavior is not aggressive.         Fluids    Intake/Output Summary (Last 24 hours) at 2/23/2022 1315  Last data filed at 2/23/2022 0813  Gross per 24 hour   Intake 281.83 ml   Output --   Net 281.83 ml       Laboratory  Recent Labs     02/23/22  0355   WBC 14.4*   RBC 5.02   HEMOGLOBIN 14.5   HEMATOCRIT 41.8   MCV 83.3   MCH 28.9   MCHC 34.7   RDW 45.7   PLATELETCT 440   MPV 10.3     Recent Labs     02/23/22  0448   SODIUM 141   POTASSIUM 3.9   CHLORIDE 102   CO2 25   GLUCOSE 87   BUN 15   CREATININE 0.61   CALCIUM 9.3                   Imaging  CT-PELVIS WITH   Final Result         1.  Multiloculated enhancing fluid collection in the right lateral subcutaneous soft tissues, appearance most compatible in appearance with abscess and surrounding cellulitis.   2.  Right inguinal adenopathy, may represent reactive adenopathy given nearby abscess. Consider other causes of adenopathy with additional workup as clinically appropriate.   3.  Subcutaneous fat stranding in the anterolateral left thigh suggests component of cellulitis.      EC-ECHOCARDIOGRAM COMPLETE W/O CONT    (Results Pending)        Assessment/Plan  * Sepsis due to cellulitis (HCC)- (present on admission)  Assessment & Plan  This is Sepsis Present on admission  SIRS criteria identified on my evaluation include: Tachycardia, with heart rate greater than 90  BPM and Leukocytosis, with WBC greater than 12,000  Source is right lateral subcutaneous soft tissues abscess and cellulitis  Sepsis protocol initiated  Fluid resuscitation ordered per protocol  IV antibiotics as appropriate for source of sepsis  While organ dysfunction may be noted elsewhere in this problem list or in the chart, degree of organ dysfunction does not meet CMS criteria for severe sepsis    Follow blood cultures  IV vancomycin  Orthopedics on board for OR        Tobacco abuse  Assessment & Plan  Nicotine patch protocol    Cutaneous abscess- (present on admission)  Assessment & Plan  CT pelvis showing multiloculated and sequential right lateral subcu soft tissues  N.p.o.  Ortho consulted will take patient to the OR this afternoon  Continue with IV vancomycin, f/u wound cx's, MRSA nares negative, however if patient goes before cx finalized would switch to oral abx that covers MRSA (ie linezolid vs bactrim)      IV drug abuse (HCC)- (present on admission)  Assessment & Plan  Has history of IV heroin and fentanyl abuse  Reports occasional methamphetamine abuse  Reports having history of MRSA in the past  Echo       VTE prophylaxis: SCDs/TEDs    I have performed a physical exam and reviewed and updated ROS and Plan today (2/23/2022). In review of yesterday's note (2/22/2022), there are no changes except as documented above.

## 2022-02-23 NOTE — PROGRESS NOTES
2/23/2022  ED Community Health Worker Intake  • Social determinates of health intake complete.   • Identified barriers to transportation.  • Has PCP appointment scheduled for 3.11.2022@3:30pm.  • Patient will be scheduled at [address] [place] [address] for primary care.  • Has transportation to appointment: Yes via [place]  • Inpatient/Outpatient assessment completed.  • Contact information provided to [place].    CHW met with patient bedside to discuss PCP follow up appointment and introduce Community Care Management services. Patient states that she is currently not established with a PCP. CHW asked patient if she would go to follow up appointment if it was made. Patient shrugged shoulders and stated 'I guess.' CHW asked patient how she would get to appointment. Patient stated that she didn't know. CHW informed patient of [place] benefits through her insurance. Patient advised of [place] contact information via AVS. Patient was scheduled at [facility] on 3.11.2022 at 3:30pm with Dr Bailon. CHW informed patient of date, time, and location of appointment via AVS. Patient stated that she was not needing any assistance with food and hosing at the moment. CHW will no longer follow.

## 2022-02-23 NOTE — ANESTHESIA PROCEDURE NOTES
Central Venous Line  Performed by: Estevan Rodriguez M.D.  Authorized by: Estevan Rodriguez M.D.     Start Time:  2/23/2022 2:20 PM  End Time:  2/23/2022 2:25 PM  Patient Location:  OR  Indication: central venous access        Other::  Pt has no other IV access point due to chronic IV drug use.  Need for definite IV access in order to proceed with surgery and medications treatments.    provider hand hygiene performed prior to central venous catheter insertion, all 5 sterile barriers used (gloves, gown, cap, mask, large sterile drape) during central venous catheter insertion and skin prep agent completely dried prior to procedure    Patient Position:  Trendelenburg  Laterality:  Left (R internal jugular vein thrombosed)  Site:  Internal jugular  Prep:  Chlorhexidine  Catheter Size:  7 Fr  Catheter Length (cm):  20  Number of Lumens:  Triple lumen  target vein identified, needle advanced into vein and blood aspirated and guidewire advanced into vein    Seldinger Technique?: Yes    Ultrasound-Guided: ultrasound-guided  Image captured, interpreted and electronically stored.  Sterile Gel and Probe Cover Used for Ultrasound?: Yes    Intravenous Verification: verified by ultrasound, venous blood return and chest x-ray pending    all ports aspirated, all ports flushed easily, guidewire was removed intact, biopatch was applied, line was sutured in place and dressing was applied    Events: patient tolerated procedure well with no complications

## 2022-02-23 NOTE — ANESTHESIA TIME REPORT
Anesthesia Start and Stop Event Times     Date Time Event    2/23/2022 1340 Ready for Procedure     1401 Anesthesia Start     1500 Anesthesia Stop        Responsible Staff  02/23/22    Name Role Begin End    Estevan Rodriguez M.D. Anesth 1401 1500        Preop Diagnosis (Free Text):  Pre-op Diagnosis     Right buttock and thigh abscess        Preop Diagnosis (Codes):    Premium Reason  A. 3PM - 7AM    Comments:

## 2022-02-23 NOTE — PROGRESS NOTES
Pharmacy Vancomycin Kinetics Note for 2/23/2022     32 y.o. female on Vancomycin day # 1     Vancomycin Indication (AUC Dosing): Skin/skin structure infection    Provider specified end date:  (TBD)    Active Antibiotics (From admission, onward)    Ordered     Ordering Provider       Wed Feb 23, 2022  7:06 AM    02/23/22 0706  vancomycin (VANCOCIN) 1,000 mg in  mL IVPB  (vancomycin (VANCOCIN) IV (LD + Maintenance))  EVERY 12 HOURS        Note to Pharmacy: Per pharmacy pharmacokinetic protocol.    Dutch Pollard M.D.       Wed Feb 23, 2022  6:13 AM    02/23/22 0613  MD Alert...Vancomycin per Pharmacy  (Abscess (Simple Sepsis))  PHARMACY TO DOSE        Question:  Indication(s) for vancomycin?  Answer:  Skin and soft tissue infection    Dutch Pollard M.D.       Wed Feb 23, 2022  3:28 AM    02/23/22 0328  vancomycin (VANCOCIN) 1,500 mg in  mL IVPB  (vancomycin (VANCOCIN) IV (LD + Maintenance))  ONCE        Note to Pharmacy: Per P&T Kinetics Protocol    Imtiaz Coronado M.D.          Dosing Weight: 55.7 kg (122 lb 12.7 oz)      Admission History: Admitted on 2/23/2022 for Sepsis due to cellulitis (HCC) [L03.90, A41.9]  Pertinent history: The patient presented to the ER for evaluation of two abscesses to her right upper thigh and right hip x 1-2 weeks. Patient admits to injecting heroin and fentanyl via IM route. She reports trying to drain the areas using razor blades.  Abscesses appear swollen and red. CT pelvis showed multilocular enhancing fluid collection in the right lateral subcutaneous soft tissues most compatible with abscess and surrounding cellulitis.  Orthopedic Surgery was consulted and they will take the patient to the OR.    Allergies:     Clonazepam, Gabadone, Gabapentin, and Tiagabine hydrochloride     Pertinent cultures to date:     Results     Procedure Component Value Units Date/Time    Blood Culture [[id number]] Collected: 02/23/22 0355    Order Status: Sent Specimen: Blood from  "Peripheral Updated: 22    Narrative:      1 of 2 for Blood Culture x 2 sites order. Per Hospital  Policy: Only change Specimen Src: to \"Line\" if specified by  physician order.    Blood Culture [[id number]] Collected: 22    Order Status: Sent Specimen: Blood from Peripheral Updated: 22    Narrative:      2 of 2 blood culture x2  Sites order. Per Hospital Policy:  Only change Specimen Src: to \"Line\" if specified by physician  order.    MRSA By PCR (Amp) [[id number]]     Order Status: Sent Specimen: Respirate from Nares           Labs:     Estimated Creatinine Clearance: 118.6 mL/min (by C-G formula based on SCr of 0.61 mg/dL).  Recent Labs     22  0355   WBC 14.4*   NEUTSPOLYS 71.70     Recent Labs     22   BUN 15   CREATININE 0.61   ALBUMIN 4.2     No intake or output data in the 24 hours ending 22 0707   /77   Pulse 86   Temp 36.6 °C (97.9 °F) (Temporal)   Resp 16   Ht 1.753 m (5' 9\")   Wt 55.7 kg (122 lb 12.7 oz)   SpO2 96%  Temp (24hrs), Av.6 °C (97.9 °F), Min:36.6 °C (97.9 °F), Max:36.6 °C (97.9 °F)      List concerns for Vancomycin clearance:     None    Pharmacokinetics:   AUC kinetics:   Ke (hr ^-1): 0.1028 hr^-1  Half life: 6.74 hr  Clearance: 3.722  Estimated TDD: 1861  Estimated Dose: 675  Estimated interval: 8.7    A/P:     -  Vancomycin dose: 1,000 mg IV Q12h (04:00 AM, 16:00 PM)    -  Next vancomycin level(s):    -not yet ordered     -  Predicted vancomycin AUC from initial AUC test calculator: 537 mg·hr/L    -  Comments: MRSA nares swab ordered. No current concerns for renal accumulation of vancomycin at this time. A peak and trough will be obtained at steady state. Pharmacy will continue to follow and recommend de-escalation of antibiotics as appropriate.     Catalina Estrada, PharmD, BCPS      " Intractable nausea and vomiting

## 2022-02-23 NOTE — ASSESSMENT & PLAN NOTE
CT pelvis showing multiloculated and sequential right lateral subcu soft tissues  N.p.o.  Ortho consulted will take patient to the OR this afternoon  Continue with IV vancomycin, f/u wound cx's, MRSA nares negative, however if patient goes before cx finalized would switch to oral abx that covers MRSA (ie linezolid vs bactrim)

## 2022-02-24 VITALS
TEMPERATURE: 97.5 F | BODY MASS INDEX: 18.19 KG/M2 | WEIGHT: 122.8 LBS | OXYGEN SATURATION: 96 % | RESPIRATION RATE: 16 BRPM | HEART RATE: 93 BPM | SYSTOLIC BLOOD PRESSURE: 101 MMHG | HEIGHT: 69 IN | DIASTOLIC BLOOD PRESSURE: 56 MMHG

## 2022-02-24 LAB
ALBUMIN SERPL BCP-MCNC: 3.2 G/DL (ref 3.2–4.9)
ALBUMIN/GLOB SERPL: 1 G/DL
ALP SERPL-CCNC: 102 U/L (ref 30–99)
ALT SERPL-CCNC: 10 U/L (ref 2–50)
ANION GAP SERPL CALC-SCNC: 12 MMOL/L (ref 7–16)
AST SERPL-CCNC: 15 U/L (ref 12–45)
BASOPHILS # BLD AUTO: 0.1 % (ref 0–1.8)
BASOPHILS # BLD: 0.02 K/UL (ref 0–0.12)
BILIRUB SERPL-MCNC: 0.3 MG/DL (ref 0.1–1.5)
BUN SERPL-MCNC: 17 MG/DL (ref 8–22)
CALCIUM SERPL-MCNC: 8.4 MG/DL (ref 8.5–10.5)
CHLORIDE SERPL-SCNC: 106 MMOL/L (ref 96–112)
CO2 SERPL-SCNC: 23 MMOL/L (ref 20–33)
CREAT SERPL-MCNC: 0.68 MG/DL (ref 0.5–1.4)
EOSINOPHIL # BLD AUTO: 0 K/UL (ref 0–0.51)
EOSINOPHIL NFR BLD: 0 % (ref 0–6.9)
ERYTHROCYTE [DISTWIDTH] IN BLOOD BY AUTOMATED COUNT: 45.3 FL (ref 35.9–50)
GLOBULIN SER CALC-MCNC: 3.2 G/DL (ref 1.9–3.5)
GLUCOSE SERPL-MCNC: 154 MG/DL (ref 65–99)
HCT VFR BLD AUTO: 35.1 % (ref 37–47)
HGB BLD-MCNC: 11.7 G/DL (ref 12–16)
IMM GRANULOCYTES # BLD AUTO: 0.08 K/UL (ref 0–0.11)
IMM GRANULOCYTES NFR BLD AUTO: 0.6 % (ref 0–0.9)
LYMPHOCYTES # BLD AUTO: 1.65 K/UL (ref 1–4.8)
LYMPHOCYTES NFR BLD: 11.6 % (ref 22–41)
MCH RBC QN AUTO: 28.6 PG (ref 27–33)
MCHC RBC AUTO-ENTMCNC: 33.3 G/DL (ref 33.6–35)
MCV RBC AUTO: 85.8 FL (ref 81.4–97.8)
MONOCYTES # BLD AUTO: 0.52 K/UL (ref 0–0.85)
MONOCYTES NFR BLD AUTO: 3.7 % (ref 0–13.4)
NEUTROPHILS # BLD AUTO: 11.94 K/UL (ref 2–7.15)
NEUTROPHILS NFR BLD: 84 % (ref 44–72)
NRBC # BLD AUTO: 0 K/UL
NRBC BLD-RTO: 0 /100 WBC
PLATELET # BLD AUTO: 308 K/UL (ref 164–446)
PMV BLD AUTO: 9.2 FL (ref 9–12.9)
POTASSIUM SERPL-SCNC: 3.8 MMOL/L (ref 3.6–5.5)
PROT SERPL-MCNC: 6.4 G/DL (ref 6–8.2)
RBC # BLD AUTO: 4.09 M/UL (ref 4.2–5.4)
SODIUM SERPL-SCNC: 141 MMOL/L (ref 135–145)
WBC # BLD AUTO: 14.2 K/UL (ref 4.8–10.8)

## 2022-02-24 PROCEDURE — 80053 COMPREHEN METABOLIC PANEL: CPT

## 2022-02-24 PROCEDURE — 700111 HCHG RX REV CODE 636 W/ 250 OVERRIDE (IP)

## 2022-02-24 PROCEDURE — A9270 NON-COVERED ITEM OR SERVICE: HCPCS | Performed by: INTERNAL MEDICINE

## 2022-02-24 PROCEDURE — 700111 HCHG RX REV CODE 636 W/ 250 OVERRIDE (IP): Performed by: STUDENT IN AN ORGANIZED HEALTH CARE EDUCATION/TRAINING PROGRAM

## 2022-02-24 PROCEDURE — 700105 HCHG RX REV CODE 258: Performed by: STUDENT IN AN ORGANIZED HEALTH CARE EDUCATION/TRAINING PROGRAM

## 2022-02-24 PROCEDURE — 85025 COMPLETE CBC W/AUTO DIFF WBC: CPT

## 2022-02-24 PROCEDURE — 700102 HCHG RX REV CODE 250 W/ 637 OVERRIDE(OP): Performed by: INTERNAL MEDICINE

## 2022-02-24 PROCEDURE — 700111 HCHG RX REV CODE 636 W/ 250 OVERRIDE (IP): Performed by: INTERNAL MEDICINE

## 2022-02-24 RX ORDER — LORAZEPAM 2 MG/ML
0.5 INJECTION INTRAMUSCULAR ONCE
Status: COMPLETED | OUTPATIENT
Start: 2022-02-24 | End: 2022-02-24

## 2022-02-24 RX ADMIN — OXYCODONE HYDROCHLORIDE 10 MG: 10 TABLET ORAL at 02:32

## 2022-02-24 RX ADMIN — LORAZEPAM 0.5 MG: 2 INJECTION INTRAMUSCULAR; INTRAVENOUS at 01:49

## 2022-02-24 RX ADMIN — HYDROMORPHONE HYDROCHLORIDE 1 MG: 1 INJECTION, SOLUTION INTRAMUSCULAR; INTRAVENOUS; SUBCUTANEOUS at 03:24

## 2022-02-24 RX ADMIN — VANCOMYCIN HYDROCHLORIDE 1000 MG: 500 INJECTION, POWDER, LYOPHILIZED, FOR SOLUTION INTRAVENOUS at 03:25

## 2022-02-24 NOTE — CARE PLAN
The patient is Watcher - Medium risk of patient condition declining or worsening    Shift Goals  Clinical Goals: Pain control  Patient Goals: rest  Family Goals: N/A    Progress made toward(s) clinical / shift goals:    Problem: Pain - Standard  Goal: Alleviation of pain or a reduction in pain to the patient’s comfort goal  Outcome: Not Progressing     Problem: Knowledge Deficit - Standard  Goal: Patient and family/care givers will demonstrate understanding of plan of care, disease process/condition, diagnostic tests and medications  Outcome: Not Progressing       Patient is not progressing towards the following goals:      Problem: Pain - Standard  Goal: Alleviation of pain or a reduction in pain to the patient’s comfort goal  Outcome: Not Progressing     Problem: Knowledge Deficit - Standard  Goal: Patient and family/care givers will demonstrate understanding of plan of care, disease process/condition, diagnostic tests and medications  Outcome: Not Progressing

## 2022-02-24 NOTE — PROGRESS NOTES
Security unable to locate pt as she was already off [facility] property. [employer] called for wellness check and emergency contact notified of AMA. [employer] will call if they locate patient. NAM notified.

## 2022-02-24 NOTE — PROGRESS NOTES
Report received and patient care assumed. Pt is resting in bed, A&O4, with complaints of pain to right leg, treated with oxycodone and  diluadid and is on RA. Pt complains of withdrawal symptoms, MD notified and one time order of ativan ordered.  All fall precautions are in place, belongings at bedside table.  Pt was updated on POC, no questions or concerns. Pt educated on use of call light for assistance. Will continue to monitor.

## 2022-02-24 NOTE — PROGRESS NOTES
"At approx 0450, Pt got changed into her street clothes, unhooked her IJ from a running vanco infusion, and walked out of her room. This RN went to go talk to the patient. Pt stated she wanted to leave. This RN discussed with her that she is able to leave if she wants but we will need to take out her IJ. Pt stated that \"she already did\" but IJ was still visible in her neck. This RN told patient that she cannot leave and if she tries we will call the police. Pt stated \"go ahead and call them, I'm leaving and don't you touch me\". Pt then walked off unit and out of the hospital. Security notified and searched for Pt. MD notified.   "

## 2022-02-25 NOTE — ANESTHESIA POSTPROCEDURE EVALUATION
Patient: Luna River    Procedure Summary     Date: 02/23/22 Room / Location: [place] [id number] / SURGERY [facility]    Anesthesia Start: 1401 Anesthesia Stop: 1500    Procedure: IRRIGATION AND DEBRIDEMENT, WOUND- RIGHT THIGH AND BUTTOCK (Right Buttocks) Diagnosis: (Right buttock and thigh abscess)    Surgeons: Dakota Cortes M.D. Responsible Provider: Estevan Rodriguez M.D.    Anesthesia Type: general ASA Status: 3          Final Anesthesia Type: general  Last vitals  BP   Blood Pressure: 101/56    Temp   36.4 °C (97.5 °F)    Pulse   93   Resp   16    SpO2   96 %      Anesthesia Post Evaluation    Patient location during evaluation: PACU  Patient participation: complete - patient participated  Level of consciousness: awake and alert    Airway patency: patent  Anesthetic complications: no  Cardiovascular status: hemodynamically stable  Respiratory status: acceptable  Hydration status: euvolemic    PONV: none          No complications documented.     Nurse Pain Score: 10 (NPRS)

## 2022-02-28 ENCOUNTER — PHARMACY VISIT (OUTPATIENT)
Dept: PHARMACY | Facility: MEDICAL CENTER | Age: 32
End: 2022-02-28
Payer: MEDICARE

## 2022-02-28 ENCOUNTER — HOSPITAL ENCOUNTER (EMERGENCY)
Facility: MEDICAL CENTER | Age: 32
End: 2022-02-28
Attending: EMERGENCY MEDICINE
Payer: COMMERCIAL

## 2022-02-28 ENCOUNTER — APPOINTMENT (OUTPATIENT)
Dept: RADIOLOGY | Facility: MEDICAL CENTER | Age: 32
End: 2022-02-28
Attending: EMERGENCY MEDICINE
Payer: COMMERCIAL

## 2022-02-28 VITALS
OXYGEN SATURATION: 96 % | TEMPERATURE: 97.7 F | RESPIRATION RATE: 21 BRPM | HEART RATE: 72 BPM | WEIGHT: 122 LBS | SYSTOLIC BLOOD PRESSURE: 132 MMHG | DIASTOLIC BLOOD PRESSURE: 88 MMHG | HEIGHT: 69 IN | BODY MASS INDEX: 18.07 KG/M2

## 2022-02-28 DIAGNOSIS — L02.91 ABSCESS: ICD-10-CM

## 2022-02-28 DIAGNOSIS — F19.10 IV DRUG ABUSE (HCC): ICD-10-CM

## 2022-02-28 DIAGNOSIS — T40.411A ACCIDENTAL FENTANYL OVERDOSE, INITIAL ENCOUNTER (HCC): ICD-10-CM

## 2022-02-28 LAB
ALBUMIN SERPL BCP-MCNC: 3.7 G/DL (ref 3.2–4.9)
ALBUMIN/GLOB SERPL: 1 G/DL
ALP SERPL-CCNC: 116 U/L (ref 30–99)
ALT SERPL-CCNC: 14 U/L (ref 2–50)
ANION GAP SERPL CALC-SCNC: 11 MMOL/L (ref 7–16)
AST SERPL-CCNC: 21 U/L (ref 12–45)
BACTERIA BLD CULT: NORMAL
BACTERIA BLD CULT: NORMAL
BASOPHILS # BLD AUTO: 0.1 % (ref 0–1.8)
BASOPHILS # BLD: 0.01 K/UL (ref 0–0.12)
BILIRUB SERPL-MCNC: <0.2 MG/DL (ref 0.1–1.5)
BUN SERPL-MCNC: 10 MG/DL (ref 8–22)
CALCIUM SERPL-MCNC: 8.9 MG/DL (ref 8.5–10.5)
CHLORIDE SERPL-SCNC: 104 MMOL/L (ref 96–112)
CO2 SERPL-SCNC: 26 MMOL/L (ref 20–33)
CREAT SERPL-MCNC: 0.6 MG/DL (ref 0.5–1.4)
EOSINOPHIL # BLD AUTO: 0 K/UL (ref 0–0.51)
EOSINOPHIL NFR BLD: 0 % (ref 0–6.9)
ERYTHROCYTE [DISTWIDTH] IN BLOOD BY AUTOMATED COUNT: 46.6 FL (ref 35.9–50)
GLOBULIN SER CALC-MCNC: 3.6 G/DL (ref 1.9–3.5)
GLUCOSE SERPL-MCNC: 94 MG/DL (ref 65–99)
HCT VFR BLD AUTO: 38.9 % (ref 37–47)
HGB BLD-MCNC: 12.8 G/DL (ref 12–16)
IMM GRANULOCYTES # BLD AUTO: 0.05 K/UL (ref 0–0.11)
IMM GRANULOCYTES NFR BLD AUTO: 0.4 % (ref 0–0.9)
LYMPHOCYTES # BLD AUTO: 1.39 K/UL (ref 1–4.8)
LYMPHOCYTES NFR BLD: 12.2 % (ref 22–41)
MCH RBC QN AUTO: 28.3 PG (ref 27–33)
MCHC RBC AUTO-ENTMCNC: 32.9 G/DL (ref 33.6–35)
MCV RBC AUTO: 86.1 FL (ref 81.4–97.8)
MONOCYTES # BLD AUTO: 0.52 K/UL (ref 0–0.85)
MONOCYTES NFR BLD AUTO: 4.6 % (ref 0–13.4)
NEUTROPHILS # BLD AUTO: 9.38 K/UL (ref 2–7.15)
NEUTROPHILS NFR BLD: 82.7 % (ref 44–72)
NRBC # BLD AUTO: 0 K/UL
NRBC BLD-RTO: 0 /100 WBC
PLATELET # BLD AUTO: 310 K/UL (ref 164–446)
PMV BLD AUTO: 8.7 FL (ref 9–12.9)
POTASSIUM SERPL-SCNC: 4.3 MMOL/L (ref 3.6–5.5)
PROT SERPL-MCNC: 7.3 G/DL (ref 6–8.2)
RBC # BLD AUTO: 4.52 M/UL (ref 4.2–5.4)
SIGNIFICANT IND 70042: NORMAL
SIGNIFICANT IND 70042: NORMAL
SITE SITE: NORMAL
SITE SITE: NORMAL
SODIUM SERPL-SCNC: 141 MMOL/L (ref 135–145)
SOURCE SOURCE: NORMAL
SOURCE SOURCE: NORMAL
WBC # BLD AUTO: 11.4 K/UL (ref 4.8–10.8)

## 2022-02-28 PROCEDURE — 80053 COMPREHEN METABOLIC PANEL: CPT

## 2022-02-28 PROCEDURE — 36415 COLL VENOUS BLD VENIPUNCTURE: CPT

## 2022-02-28 PROCEDURE — 85025 COMPLETE CBC W/AUTO DIFF WBC: CPT

## 2022-02-28 PROCEDURE — 71045 X-RAY EXAM CHEST 1 VIEW: CPT

## 2022-02-28 PROCEDURE — RXMED WILLOW AMBULATORY MEDICATION CHARGE: Performed by: EMERGENCY MEDICINE

## 2022-02-28 PROCEDURE — 99284 EMERGENCY DEPT VISIT MOD MDM: CPT

## 2022-02-28 PROCEDURE — 700111 HCHG RX REV CODE 636 W/ 250 OVERRIDE (IP): Performed by: EMERGENCY MEDICINE

## 2022-02-28 RX ORDER — BUPRENORPHINE HYDROCHLORIDE AND NALOXONE HYDROCHLORIDE DIHYDRATE 8; 2 MG/1; MG/1
2 TABLET SUBLINGUAL DAILY
Qty: 4 TABLET | Refills: 0 | Status: SHIPPED | OUTPATIENT
Start: 2022-02-28 | End: 2022-03-02

## 2022-02-28 RX ORDER — SULFAMETHOXAZOLE AND TRIMETHOPRIM 800; 160 MG/1; MG/1
1 TABLET ORAL 2 TIMES DAILY
Qty: 14 TABLET | Refills: 0 | Status: SHIPPED | OUTPATIENT
Start: 2022-02-28 | End: 2022-03-07

## 2022-02-28 RX ORDER — BUPRENORPHINE HYDROCHLORIDE AND NALOXONE HYDROCHLORIDE DIHYDRATE 8; 2 MG/1; MG/1
1 TABLET SUBLINGUAL ONCE
Status: COMPLETED | OUTPATIENT
Start: 2022-02-28 | End: 2022-02-28

## 2022-02-28 RX ADMIN — BUPRENORPHINE HYDROCHLORIDE AND NALOXONE HYDROCHLORIDE DIHYDRATE 1 TABLET: 8; 2 TABLET SUBLINGUAL at 14:30

## 2022-02-28 ASSESSMENT — FIBROSIS 4 INDEX: FIB4 SCORE: 0.46

## 2022-02-28 NOTE — ED TRIAGE NOTES
Chief Complaint   Patient presents with   • Suicidal     Pt BIB EMS from the [facility], report that pt was recently here for surgery on right hip abscess. Pt AMA'd with PICC line in left side of neck about 3-4 days ago. Today she was found ALOC, respiratory depression after shooting up Fentanyl in her PICC line. Pt given 2.5mg Narcan and is now A&Ox4. Pt denies SI but states that she is tied of being sick.

## 2022-02-28 NOTE — ED PROVIDER NOTES
ED Provider Note    ER PROVIDER NOTE        CHIEF COMPLAINT  Chief Complaint   Patient presents with   • Suicidal     Pt BIB EMS from the [facility], report that pt was recently here for surgery on right hip abscess. Pt AMA'd with PICC line in left side of neck about 3-4 days ago. Today she was found ALOC, respiratory depression after shooting up Fentanyl in her PICC line. Pt given 2.5mg Narcan and is now A&Ox4. Pt denies SI but states that she is tied of being sick.        HPI  Luna River is a 32 y.o. female who presents to the emergency department after overdose.  Patient was found with respiratory depression and somnolent was given 2.5 mg by EMS and fully aroused and was responsive.  Patient reports to me that she injected fentanyl.  She states she was just trying to get high, denies any suicidal thoughts.  She denies any fevers or chills.  No headaches, chest pain, shortness of breath or difficulty breathing.  No new pain to her wounds    Patient did leave the hospital AGAINST MEDICAL ADVICE 4 days ago with a PICC line in place in her neck after she had had incision and drainage of abscesses    REVIEW OF SYSTEMS  Pertinent positives include overdose. Pertinent negatives include no suicidal thoughts. See HPI for details. All other systems reviewed and are negative.    PAST MEDICAL HISTORY   has a past medical history of Anorexia, Bipolar 2 disorder (HCC), bulemia, Hepatitis C, Hepatitis C, Impetigo, Liver disease, Major depressive disorder, Mood disorder (HCC), MRSA infection, Oppositional defiant disorder, Polysubstance abuse (HCC), Post-traumatic stress disorder, Schizoaffective disorder, and Scoliosis.    SURGICAL HISTORY   has a past surgical history that includes tonsillectomy; removal of tonsils,<11 y/o; other; anthony by laparoscopy (2/1/2013); and irrigation & debridement general (Right, 2/23/2022).    FAMILY HISTORY  No family history on file.   No history of coronary artery disease    SOCIAL  "HISTORY  Social History     Socioeconomic History   • Marital status: Single   Tobacco Use   • Smoking status: Current Every Day Smoker     Packs/day: 0.50     Years: 7.00     Pack years: 3.50     Types: Cigarettes   • Smokeless tobacco: Never Used   Vaping Use   • Vaping Use: Never used   Substance and Sexual Activity   • Alcohol use: Yes     Comment: rarely   • Drug use: Yes     Types: Intravenous, Injected (Skin Popping)     Comment: Heroin, fentanyl, meth      Social History     Substance and Sexual Activity   Drug Use Yes   • Types: Intravenous, Injected (Skin Popping)    Comment: Heroin, fentanyl, meth       CURRENT MEDICATIONS  Home Medications    **Home medications have not yet been reviewed for this encounter**         ALLERGIES  Allergies   Allergen Reactions   • Clonazepam      Pt states that she overdosed on it before. Furthermore, per historical, pt had seizure-like activity while taking Clonazepam as a child.   • Gabadone      Unknown reaction.   • Gabapentin      Causes seizures.   • Tiagabine Hydrochloride      Unknown reaction.       PHYSICAL EXAM  VITAL SIGNS: /53   Pulse 73   Temp 36.4 °C (97.5 °F) (Temporal)   Resp 15   Ht 1.753 m (5' 9\")   Wt 55.3 kg (122 lb)   SpO2 98%   BMI 18.02 kg/m²   Pulse ox interpretation: I interpret this pulse ox as normal.    Constitutional: Mildly sleepy but easily arousable  HENT: No signs of trauma, Bilateral external ears normal, Nose normal.   Eyes: Pupils are equal and reactive, Conjunctiva normal, Non-icteric.   Neck: Normal range of motion, No tenderness, Supple, No stridor.     Cardiovascular: Regular rate and rhythm, no murmurs.   Thorax & Lungs: Normal breath sounds, No respiratory distress, No wheezing, No chest tenderness.   Abdomen: Bowel sounds normal, Soft, No tenderness, No masses, No pulsatile masses. No peritoneal signs.  Skin: Warm, Dry, No erythema, wounds over the right buttock and thigh with dressing in place, there is no " surrounding erythema or drainage  Back: No bony tenderness, No CVA tenderness.   Extremities: Intact distal pulses, No edema, No tenderness, No cyanosis,.  Musculoskeletal: Good range of motion in all major joints. No tenderness to palpation or major deformities noted.   Neurologic: Sleepy but easily arousable normal motor function, Normal sensory function, No focal deficits noted.   Psychiatric: Affect normal, Judgment normal, Mood normal.     DIAGNOSTIC STUDIES / PROCEDURES      LABS  Labs Reviewed   CBC WITH DIFFERENTIAL - Abnormal; Notable for the following components:       Result Value    WBC 11.4 (*)     MCHC 32.9 (*)     MPV 8.7 (*)     Neutrophils-Polys 82.70 (*)     Lymphocytes 12.20 (*)     Neutrophils (Absolute) 9.38 (*)     All other components within normal limits   COMP METABOLIC PANEL - Abnormal; Notable for the following components:    Alkaline Phosphatase 116 (*)     Globulin 3.6 (*)     All other components within normal limits   ESTIMATED GFR       All labs reviewed by me.    RADIOLOGY  DX-CHEST-PORTABLE (1 VIEW)   Final Result      No acute cardiopulmonary abnormality.        The radiologist's interpretation of all radiological studies have been reviewed and images independently viewed by me.    COURSE & MEDICAL DECISION MAKING  Nursing notes, VS, PMSFHx reviewed in chart.    11:31 AM Patient seen and examined at bedside. Ordered for CBC, CMP, chest x-ray to evaluate her symptoms.     She tells me she is interested in Suboxone therapy, I will have peer recovery discussed this with the patient with potential for initiating therapy here today      In review of patient records, patient was admitted to the hospital February 23, underwent incision and drainage of a thigh and buttock abscess on the 23rd.  She left the following day AGAINST MEDICAL ADVICE prior to being discharged    12:13 PM  Patient was seen and evaluated by peer recovery, she is indeed interested in Suboxone, MAT therapy.  She will  go to intake on Tuesday for continued treatment and I will prescribe her Suboxone here with a bridge prescription    2:01 PM  Patient is placed into ED observation at 2 PM on 2/28/2022 pending further metabolism of her opioid/fentanyl so that she can be initiated on Suboxone      Decision Making:  This is a 32 y.o. female presenting after overdose on fentanyl.  Patient denies any suicidal component to this she was just trying to get high does have multiple prior overdoses.  Fortunately today she was interested in medication assisted therapy and will be provided with a dose of Suboxone here as well as start her medications for this.  Peer recovery has also seen her and she can have her intake for ongoing medication assisted treatment tomorrow.  She did have abscesses that were drained prior, and there does not appear to be any finding of progressive infection, she is afebrile, her white count is actually decreasing, and I do not see the need for IV antibiotics anymore at this time.  Prescription is given for Bactrim    Patient is admitted to ED observation for continued monitoring of her mental status with anticipation that as she begins to metabolize her opioids she can be given her dose of Suboxone here and then discharged for outpatient follow-up    Care will be turned over to Dr. Carson pending this observation    FINAL IMPRESSION  1. IV drug abuse (HCC)    2. Accidental fentanyl overdose, initial encounter (HCC)    3. Abscess         The note accurately reflects work and decisions made by me.  Shawn Lamar M.D.  2/28/2022  2:25 PM

## 2022-03-01 LAB
BACTERIA SPEC ANAEROBE CULT: NORMAL
SIGNIFICANT IND 70042: NORMAL
SITE SITE: NORMAL
SOURCE SOURCE: NORMAL

## 2022-03-01 NOTE — DISCHARGE SUMMARY
"  ED Observation Discharge Summary    Patient:Luna River  Patient : 1990  Patient MRN: [id number]  Patient PCP: Pcp Pt States None    Admit Date: 2022  Discharge Date and Time: 22 16:30 PM  Discharge Diagnosis: Drug Overdose  Discharge Attending: Samara Carson M.D.  Discharge Service: ED Observation    ED Course  Luna is a 32 y.o. female who was evaluated at [facility] for accidental drug overdose.  Patient came to the emergency department after accidentally overdosing on fentanyl.  She had done this through PICC line but she left the hospital with AMA a few days ago.  She is not having any acute suicidal ideation.  She was interested in initiating Suboxone therapy.  She was signed out to myself to follow-up metabolism of her overdose and once she was more alert and oriented she could be discharged with Suboxone.  Peer recovery has already seen patient and will help with outpatient follow-up site of this.  She was prescribed Suboxone bridge which will be provided to her by the pharmacy team.  I have reassessed patient and she was alert and oriented, tolerating oral intake and ambulating with a steady gait without difficulty.  Therefore she is discharged with her resources and Suboxone prescription.  She is in stable condition at time of discharge.  PICC line was pulled prior to discharge.    Discharge Exam:  /88   Pulse 72   Temp 36.5 °C (97.7 °F)   Resp (!) 21   Ht 1.753 m (5' 9\")   Wt 55.3 kg (122 lb)   SpO2 96%   BMI 18.02 kg/m² .    Constitutional: Awake and alert. Nontoxic  HENT:  Grossly normal  Eyes: Grossly normal  Neck: Normal range of motion  Cardiovascular: Normal heart rate   Thorax & Lungs: No respiratory distress  Abdomen: Nontender  Skin:  No pathologic rash.   Extremities: Well perfused  Psychiatric: Affect normal    Labs  Results for orders placed or performed during the hospital encounter of 22   CBC WITH DIFFERENTIAL   Result " Value Ref Range    WBC 11.4 (H) 4.8 - 10.8 K/uL    RBC 4.52 4.20 - 5.40 M/uL    Hemoglobin 12.8 12.0 - 16.0 g/dL    Hematocrit 38.9 37.0 - 47.0 %    MCV 86.1 81.4 - 97.8 fL    MCH 28.3 27.0 - 33.0 pg    MCHC 32.9 (L) 33.6 - 35.0 g/dL    RDW 46.6 35.9 - 50.0 fL    Platelet Count 310 164 - 446 K/uL    MPV 8.7 (L) 9.0 - 12.9 fL    Neutrophils-Polys 82.70 (H) 44.00 - 72.00 %    Lymphocytes 12.20 (L) 22.00 - 41.00 %    Monocytes 4.60 0.00 - 13.40 %    Eosinophils 0.00 0.00 - 6.90 %    Basophils 0.10 0.00 - 1.80 %    Immature Granulocytes 0.40 0.00 - 0.90 %    Nucleated RBC 0.00 /100 WBC    Neutrophils (Absolute) 9.38 (H) 2.00 - 7.15 K/uL    Lymphs (Absolute) 1.39 1.00 - 4.80 K/uL    Monos (Absolute) 0.52 0.00 - 0.85 K/uL    Eos (Absolute) 0.00 0.00 - 0.51 K/uL    Baso (Absolute) 0.01 0.00 - 0.12 K/uL    Immature Granulocytes (abs) 0.05 0.00 - 0.11 K/uL    NRBC (Absolute) 0.00 K/uL   COMP METABOLIC PANEL   Result Value Ref Range    Sodium 141 135 - 145 mmol/L    Potassium 4.3 3.6 - 5.5 mmol/L    Chloride 104 96 - 112 mmol/L    Co2 26 20 - 33 mmol/L    Anion Gap 11.0 7.0 - 16.0    Glucose 94 65 - 99 mg/dL    Bun 10 8 - 22 mg/dL    Creatinine 0.60 0.50 - 1.40 mg/dL    Calcium 8.9 8.5 - 10.5 mg/dL    AST(SGOT) 21 12 - 45 U/L    ALT(SGPT) 14 2 - 50 U/L    Alkaline Phosphatase 116 (H) 30 - 99 U/L    Total Bilirubin <0.2 0.1 - 1.5 mg/dL    Albumin 3.7 3.2 - 4.9 g/dL    Total Protein 7.3 6.0 - 8.2 g/dL    Globulin 3.6 (H) 1.9 - 3.5 g/dL    A-G Ratio 1.0 g/dL   ESTIMATED GFR   Result Value Ref Range    GFR If African American >60 >60 mL/min/1.73 m 2    GFR If Non African American >60 >60 mL/min/1.73 m 2       Radiology  DX-CHEST-PORTABLE (1 VIEW)   Final Result      No acute cardiopulmonary abnormality.          Disposition: *Discharged in stable condition    Medications:   Discharge Medication List as of 2/28/2022  4:09 PM      START taking these medications    Details   buprenorphine-naloxone (SUBOXONE) 8-2 mg SL Tab SL Place 2  Tablets under the tongue every day for 2 days.For meds to beds in ER.Disp-4 Tablet, R-0, Normal      sulfamethoxazole-trimethoprim (BACTRIM DS) 800-160 MG tablet Take 1 Tablet by mouth 2 times a day for 7 days., Disp-14 Tablet, R-0, Normal             Discharge Condition: Stable    Electronically signed by: Samara Carson M.D., 2/28/2022 11:45 PM

## 2022-03-30 ENCOUNTER — PHARMACY VISIT (OUTPATIENT)
Dept: PHARMACY | Facility: MEDICAL CENTER | Age: 32
End: 2022-03-30
Payer: MEDICARE

## 2022-03-30 ENCOUNTER — HOSPITAL ENCOUNTER (EMERGENCY)
Facility: MEDICAL CENTER | Age: 32
End: 2022-03-30
Attending: EMERGENCY MEDICINE
Payer: COMMERCIAL

## 2022-03-30 VITALS
BODY MASS INDEX: 17.77 KG/M2 | OXYGEN SATURATION: 98 % | TEMPERATURE: 99.1 F | RESPIRATION RATE: 22 BRPM | WEIGHT: 120 LBS | HEART RATE: 97 BPM | HEIGHT: 69 IN | SYSTOLIC BLOOD PRESSURE: 111 MMHG | DIASTOLIC BLOOD PRESSURE: 71 MMHG

## 2022-03-30 DIAGNOSIS — F11.10 HEROIN ABUSE (HCC): ICD-10-CM

## 2022-03-30 DIAGNOSIS — L02.91 CUTANEOUS ABSCESS, UNSPECIFIED SITE: ICD-10-CM

## 2022-03-30 PROCEDURE — 99285 EMERGENCY DEPT VISIT HI MDM: CPT

## 2022-03-30 PROCEDURE — 700102 HCHG RX REV CODE 250 W/ 637 OVERRIDE(OP): Performed by: EMERGENCY MEDICINE

## 2022-03-30 PROCEDURE — 304217 HCHG IRRIGATION SYSTEM

## 2022-03-30 PROCEDURE — A9270 NON-COVERED ITEM OR SERVICE: HCPCS | Performed by: EMERGENCY MEDICINE

## 2022-03-30 PROCEDURE — 96372 THER/PROPH/DIAG INJ SC/IM: CPT

## 2022-03-30 PROCEDURE — 700101 HCHG RX REV CODE 250: Performed by: EMERGENCY MEDICINE

## 2022-03-30 PROCEDURE — RXMED WILLOW AMBULATORY MEDICATION CHARGE: Performed by: EMERGENCY MEDICINE

## 2022-03-30 PROCEDURE — 303977 HCHG I & D

## 2022-03-30 RX ORDER — LIDOCAINE HYDROCHLORIDE AND EPINEPHRINE BITARTRATE 20; .01 MG/ML; MG/ML
10 INJECTION, SOLUTION SUBCUTANEOUS ONCE
Status: COMPLETED | OUTPATIENT
Start: 2022-03-30 | End: 2022-03-30

## 2022-03-30 RX ORDER — KETAMINE HYDROCHLORIDE 50 MG/ML
3 INJECTION, SOLUTION INTRAMUSCULAR; INTRAVENOUS ONCE
Status: COMPLETED | OUTPATIENT
Start: 2022-03-30 | End: 2022-03-30

## 2022-03-30 RX ORDER — SULFAMETHOXAZOLE AND TRIMETHOPRIM 800; 160 MG/1; MG/1
1 TABLET ORAL ONCE
Status: COMPLETED | OUTPATIENT
Start: 2022-03-30 | End: 2022-03-30

## 2022-03-30 RX ORDER — SULFAMETHOXAZOLE AND TRIMETHOPRIM 800; 160 MG/1; MG/1
1 TABLET ORAL 2 TIMES DAILY
Qty: 20 TABLET | Refills: 0 | Status: SHIPPED | OUTPATIENT
Start: 2022-03-30 | End: 2022-04-08 | Stop reason: SDUPTHER

## 2022-03-30 RX ADMIN — SULFAMETHOXAZOLE AND TRIMETHOPRIM 1 TABLET: 800; 160 TABLET ORAL at 18:43

## 2022-03-30 RX ADMIN — KETAMINE HYDROCHLORIDE 163 MG: 50 INJECTION INTRAMUSCULAR; INTRAVENOUS at 11:22

## 2022-03-30 RX ADMIN — LIDOCAINE HYDROCHLORIDE AND EPINEPHRINE 10 ML: 20; 10 INJECTION, SOLUTION INFILTRATION; PERINEURAL at 11:00

## 2022-03-30 ASSESSMENT — FIBROSIS 4 INDEX: FIB4 SCORE: 0.54

## 2022-03-30 NOTE — ED TRIAGE NOTES
Chief Complaint   Patient presents with   • Abscess     To left hip and to left shoulder; pt states she started to drain the one on the left shoulder     Pt brought in by EMS and is ambulatory to triage for above complaint. Pt has hx of multiple abscesses s/t skin popping. Despite hx of drug use pt is calm and cooperative at this time.     Pt is alert/oriented and follows commands. Pt speaking in full sentences and responds appropriately to questions. No acute distress noted in triage and respirations are even and unlabored.     Pt placed in lobby and educated on triage process. Pt encouraged to alert staff for any changes in condition.

## 2022-03-30 NOTE — DISCHARGE PLANNING
Meds-to-Beds: Discharge prescription orders listed below delivered to patient's bedside. GERARD Alvarado notified. Written information regarding the dispensed prescriptions was provided to the patient including the phone number of the pharmacy to call for any questions.     Current Outpatient Medications   Medication Sig Dispense Refill   • sulfamethoxazole-trimethoprim (BACTRIM DS) 800-160 MG tablet Take 1 Tablet by mouth 2 times a day for 10 days. 20 Tablet 0      Sammie Gandara, PharmD

## 2022-03-30 NOTE — ED NOTES
Pt arouses to voice moves all extremities cannot stay awake to hold conversation MD at bedside aware appears to be resting comfortably call light within reach bed low and locked

## 2022-03-30 NOTE — ED NOTES
Report received from RN. Assumed care of pt. Pt resting, visible chest rise, no s/s of pain or distress.

## 2022-03-30 NOTE — ED NOTES
Pt wounds dressed with sterile dressings by jorge Tai per MD Ross directions. Pt able to cooperate with staff to dress wounds. Moving all extremities following commands calm and sleepy

## 2022-03-30 NOTE — ED NOTES
Pt maintained RA saturation through out conscious sedation responding to MD when prompted able to tolerate procedure well

## 2022-03-30 NOTE — ED PROVIDER NOTES
ED Provider Note    CHIEF COMPLAINT  Chief Complaint   Patient presents with   • Abscess     To left hip and to left shoulder; pt states she started to drain the one on the left shoulder       HPI  Luna River is a 32 y.o. female who presents with chief complaint of cutaneous abscess to left hip and left shoulder.  Patient reports that she regularly skin pops with heroin, fentanyl or methamphetamine.  Patient admits to continually using.  Patient denies any fevers or chills.  She is able to ambulate without issue.    REVIEW OF SYSTEMS  ROS  See HPI for further details. All other systems are negative.     PAST MEDICAL HISTORY   has a past medical history of Anorexia, Bipolar 2 disorder (HCC), bulemia, Hepatitis C, Hepatitis C, Impetigo, Liver disease, Major depressive disorder, Mood disorder (HCC), MRSA infection, Oppositional defiant disorder, Polysubstance abuse (HCC), Post-traumatic stress disorder, Schizoaffective disorder, and Scoliosis.    SOCIAL HISTORY  Social History     Tobacco Use   • Smoking status: Current Every Day Smoker     Packs/day: 0.50     Years: 7.00     Pack years: 3.50     Types: Cigarettes   • Smokeless tobacco: Never Used   Vaping Use   • Vaping Use: Never used   Substance and Sexual Activity   • Alcohol use: Yes     Comment: rarely   • Drug use: Yes     Types: Intravenous, Injected (Skin Popping)     Comment: Heroin, fentanyl, meth   • Sexual activity: Not on file       SURGICAL HISTORY   has a past surgical history that includes tonsillectomy; removal of tonsils,<15 y/o; other; anthony by laparoscopy (2/1/2013); and irrigation & debridement general (Right, 2/23/2022).    CURRENT MEDICATIONS  Home Medications    **Home medications have not yet been reviewed for this encounter**         ALLERGIES  Allergies   Allergen Reactions   • Clonazepam      Pt states that she overdosed on it before. Furthermore, per historical, pt had seizure-like activity while taking Clonazepam as a child.   •  Gabadone      Unknown reaction.   • Gabapentin      Causes seizures.   • Tiagabine Hydrochloride      Unknown reaction.       PHYSICAL EXAM  Vitals:    03/30/22 0948   BP: 111/70   Pulse: (!) 101   Resp: 14   Temp: 37.2 °C (99 °F)   SpO2: 100%       Physical Exam  Constitutional:       Appearance: She is well-developed.   HENT:      Head: Normocephalic and atraumatic.   Eyes:      Conjunctiva/sclera: Conjunctivae normal.   Cardiovascular:      Rate and Rhythm: Normal rate and regular rhythm.      Comments: No associated murmur  Pulmonary:      Effort: Pulmonary effort is normal.      Breath sounds: Normal breath sounds.   Abdominal:      General: Bowel sounds are normal. There is no distension.      Palpations: Abdomen is soft.      Tenderness: There is no abdominal tenderness. There is no rebound.   Musculoskeletal:      Cervical back: Normal range of motion and neck supple.   Skin:     General: Skin is warm and dry.      Comments: Multiple scabs around patient's body, there is a 4 cm cutaneous abscess on patient's left shoulder with surrounding erythema, there is a 6 cm abscess on patient's left thigh, there is a 4 cm abscess on patient's right thigh.  Patient with forward motion of her hips without any pain evoked.  Full range of motion of shoulder without any pain involved.  There is no associated hemorrhagic bullae or wound crepitus.  Distal pulses in all 4 extremities are 2+, sensation is intact throughout.   Neurological:      Mental Status: She is alert and oriented to person, place, and time.   Psychiatric:         Behavior: Behavior normal.       Procedural sedation  Patient consented for procedural sedation, risk benefits and alternatives were discussed  Timeout was performed  Patient was placed on pulse oximetry, telemetry, and CO2 capnography.  Respiratory therapy and nursing at bedside.  Patient was given 3mg/kg IM ketamine repeat aliquots were given  Patient tolerated well without any episodes of  hypercapnia, hypoxia or apnea      Incision and Drainage Procedure Note    Indication: Abscess    Procedure: The patient was positioned appropriately and the skin over the incision site was prepped with betadine and draped in a sterile fashion. Local anesthesia was obtained by infiltration using 2% Lidocaine with epinephrine.  An incision was then made over the apex of the lesion then foul smelling and purulent material was expressed. Loculations were broken up using a hemostat and more of the material was able to be expressed. The drainage cavity was then irrigated and packed with sterile gauze.     This was performed 3 times and similar fashion, for each associated abscess.  A considerable amount of purulent drainage was removed.    The patient tolerated the procedure well.    Complications: None        COURSE & MEDICAL DECISION MAKING  Pertinent Labs & Imaging studies reviewed. (See chart for details)    Patient here with multiple cutaneous abscesses secondary to ongoing IV drug use.  Patient does not have associated fever.  She is very anxious about getting the wounds treated which would explain her very mild tachycardia.  Patient does not have any associated murmur.  Patient originally consented for abscess incision and drainage however after draining the first abscess patient refused to move forward without procedural sedation.  We discussed the risks of procedural sedation with patient.  Patient refuses to have an IV.  She refused to have any blood drawn.  Though certainly not ideal I do believe that incision and drainage is absolutely necessary, therefore will give IM ketamine as patient is refusing IV sedation.  Patient given intramuscular ketamine which helped facilitate patient's multiple abscesses incision and drainage.  Please refer to the procedure note above.  Patient given Bactrim.  Patient meds sent to the [facility], meds to beds was utilized so as to increase the chance of patient taking her  medications.  I discussed return precautions.       The patient will return for worsening symptoms and is stable at the time of discharge. The patient verbalizes understanding and will comply.    FINAL IMPRESSION    1. Cutaneous abscess, unspecified site  sulfamethoxazole-trimethoprim (BACTRIM DS) 800-160 MG tablet   2. Heroin abuse (HCC)           Electronically signed by: Shawn Ross M.D., 3/30/2022 10:36 AM

## 2022-03-30 NOTE — ED NOTES
Pt appropriately sedated RT at bedside MD at bedside RN at bedside cardiac monitor pulse ox capnography oxygen suction in place. Pt responding appropriately

## 2022-03-30 NOTE — ED NOTES
MD morocho completed drainage of abscesses pt calm and cooperative responds to commands able to tolerate procedure well

## 2022-03-30 NOTE — DISCHARGE PLANNING
Request from BSN to get Meds to Beds. CM spoke with [facility] who states they will be ready in 30 minutes and delivered in 1-11/2 hr or pt can go to pharmacy to pick them up.    Will notify BSN.

## 2022-03-31 NOTE — ED NOTES
Pt discharged home as ordered. Pt given sandwich and crackers/juice. Pt given meals from meds to bed. Pt verbalized understanding. Pt encouraged to return here for a recheck or if needed. Pt encouraged to take all her antibiotics. Pt left ambulating independently and her family is in route to pick her up

## 2022-04-05 ENCOUNTER — HOSPITAL ENCOUNTER (EMERGENCY)
Facility: MEDICAL CENTER | Age: 32
End: 2022-04-05
Attending: EMERGENCY MEDICINE
Payer: COMMERCIAL

## 2022-04-05 VITALS
HEIGHT: 69 IN | HEART RATE: 77 BPM | WEIGHT: 121.69 LBS | TEMPERATURE: 98.1 F | BODY MASS INDEX: 18.02 KG/M2 | OXYGEN SATURATION: 100 % | DIASTOLIC BLOOD PRESSURE: 76 MMHG | SYSTOLIC BLOOD PRESSURE: 112 MMHG | RESPIRATION RATE: 17 BRPM

## 2022-04-05 DIAGNOSIS — L03.317 CELLULITIS OF BUTTOCK: ICD-10-CM

## 2022-04-05 DIAGNOSIS — L02.91 ABSCESS: ICD-10-CM

## 2022-04-05 PROCEDURE — 303977 HCHG I & D

## 2022-04-05 PROCEDURE — 99282 EMERGENCY DEPT VISIT SF MDM: CPT | Mod: 25

## 2022-04-05 PROCEDURE — 700101 HCHG RX REV CODE 250: Performed by: EMERGENCY MEDICINE

## 2022-04-05 RX ORDER — LIDOCAINE HYDROCHLORIDE AND EPINEPHRINE 10; 10 MG/ML; UG/ML
10 INJECTION, SOLUTION INFILTRATION; PERINEURAL ONCE
Status: DISCONTINUED | OUTPATIENT
Start: 2022-04-05 | End: 2022-04-05

## 2022-04-05 RX ORDER — LIDOCAINE HYDROCHLORIDE AND EPINEPHRINE BITARTRATE 20; .01 MG/ML; MG/ML
10 INJECTION, SOLUTION SUBCUTANEOUS ONCE
Status: COMPLETED | OUTPATIENT
Start: 2022-04-05 | End: 2022-04-05

## 2022-04-05 RX ADMIN — LIDOCAINE HYDROCHLORIDE AND EPINEPHRINE 10 ML: 20; 10 INJECTION, SOLUTION INFILTRATION; PERINEURAL at 20:00

## 2022-04-05 ASSESSMENT — FIBROSIS 4 INDEX: FIB4 SCORE: 0.54

## 2022-04-06 NOTE — ED TRIAGE NOTES
"Chief Complaint   Patient presents with   • Wound Check     Pt had several abscesses packed with sterile gauze 4-5 days ago. Pt removed the packing from all her abscesses except for 1 and wants it looked at.      /94   Pulse (!) 117   Temp 35.9 °C (96.7 °F) (Temporal)   Resp 18   Ht 1.753 m (5' 9\")   Wt 55.2 kg (121 lb 11.1 oz)   SpO2 93%   BMI 17.97 kg/m²     Pt ambulatory to triage for above, states she has been compliant with her antibiotics she was prescribed.   "

## 2022-04-06 NOTE — ED PROVIDER NOTES
ED Provider Note    CHIEF COMPLAINT  Chief Complaint   Patient presents with   • Wound Check     Pt had several abscesses packed with sterile gauze 4-5 days ago. Pt removed the packing from all her abscesses except for 1 and wants it looked at.        HPI  Patient is a 32-year-old female with a history of recurrent abscesses from skin popping heroin, fentanyl and methamphetamine.  She admits to ongoing use and says that she has not had any fevers or chills, she has used within the last 24 hours, she was seen and evaluated at the end of March for multiple abscesses and had an incision and drainage performed along with placement of some packing.  She is here for a wound check    Review of the chart shows that the patient was seen on 3/30, there was cutaneous abscesses noted by the previous physician incisions were made over the apex of these lesions were purulent material was expressed.  She was started on Bactrim.  Patient reports she has been adherent to the medication.    PPE Note: I personally donned full PPE for all patient encounters during this visit, including being clean-shaven with an N95 respirator mask, gloves, and goggles.     REVIEW OF SYSTEMS  See HPI for further details. All other systems are negative.     PAST MEDICAL HISTORY   has a past medical history of Anorexia, Bipolar 2 disorder (HCC), bulemia, Hepatitis C, Hepatitis C, Impetigo, Liver disease, Major depressive disorder, Mood disorder (HCC), MRSA infection, Oppositional defiant disorder, Polysubstance abuse (HCC), Post-traumatic stress disorder, Schizoaffective disorder, and Scoliosis.    SOCIAL HISTORY  Social History     Tobacco Use   • Smoking status: Current Every Day Smoker     Packs/day: 0.50     Years: 7.00     Pack years: 3.50     Types: Cigarettes   • Smokeless tobacco: Never Used   Vaping Use   • Vaping Use: Never used   Substance and Sexual Activity   • Alcohol use: Yes     Comment: rarely   • Drug use: Yes     Types: Intravenous,  "Injected (Skin Popping)     Comment: Heroin, fentanyl, meth   • Sexual activity: Not on file     SURGICAL HISTORY   has a past surgical history that includes tonsillectomy; removal of tonsils,<13 y/o; other; anthony by laparoscopy (2/1/2013); and irrigation & debridement general (Right, 2/23/2022).    CURRENT MEDICATIONS  Home Medications     Reviewed by Elaina Sánchez R.N. (Registered Nurse) on 04/05/22 at 1800  Med List Status: Partial   Medication Last Dose Status   sulfamethoxazole-trimethoprim (BACTRIM DS) 800-160 MG tablet  Active              ALLERGIES  Allergies   Allergen Reactions   • Clonazepam      Pt states that she overdosed on it before. Furthermore, per historical, pt had seizure-like activity while taking Clonazepam as a child.   • Gabadone      Unknown reaction.   • Gabapentin      Causes seizures.   • Tiagabine Hydrochloride      Unknown reaction.       PHYSICAL EXAM  VITAL SIGNS: /76   Pulse 77   Temp 36.7 °C (98.1 °F) (Temporal)   Resp 17   Ht 1.753 m (5' 9\")   Wt 55.2 kg (121 lb 11.1 oz)   SpO2 100%   BMI 17.97 kg/m²   Pulse ox interpretation: I interpret this pulse ox as normal.  Genl: /F sitting in gurney comfortably, speaking clearly, appears in no acute distress   Head: NC/AT   Pulmonary: Lungs are clear to auscultation bilaterally  CV:  RRR (98), no murmur appreciated, pulses 2+ in both upper and lower extremities,  Abdomen: soft, NT/ND; no rebound/guarding  Musculoskeletal: Pain free ROM of the neck. Moving upper and lower extremities and spontaneous in coordinated fashion  Neuro: A&Ox4 (person, place, time, situation), speech fluent, gait steady, no focal deficits appreciated,   Skin: Small areas of scabbed lesions at various stages of healing.  There is a healing cutaneous abscess on the left shoulder that shows slight amount of remaining induration that this appears decreased from the previous boundaries.  The patient's left thigh has packing in place with overall " decreased amount of induration from previous skin markings.  The right hip has some redness and induration and the areas of previous incision and drainage do show some closure of the previously described abscesses and wound packing has been removed. No pallor or jaundice.  No cyanosis.     DIAGNOSTIC STUDIES / PROCEDURES    LABS  Labs Reviewed - No data to display    RADIOLOGY  No orders to display     COURSE & MEDICAL DECISION MAKING  Pertinent Labs & Imaging studies reviewed. (See chart for details)    DDX:cellulitis, abscess, sepsis considered    MDM    Initial evaluation at 1910:  Seen and evaluated presents with skin popping abscesses and cellulitis and was started on antibiotics.  She has some interval improvement in these findings of some areas where she removed the wound packing prematurely have closed over and had reaccumulation of purulent material is noted.  She was initially tachycardic though she was in acute pain and discomfort but was afebrile.  Regional anesthesia into these previously incised wounds was provided and subsequent reassessment showed normalization of her tachycardia.  She is afebrile at this time and has been taking her medications as directed.  See incision and drainage note below for purulent material was expressed out of these wounds, packing was replaced and allowed to drain.  She will continue with her Bactrim as scheduled and will follow up with repeat wound assessment in 2 days time.  She is given very strict return precautions and verbalizes this back to me should she have any fevers, chills, mental status changes or any migration or spread of the current findings.    Procedure - Incision and Drainage  Patient consented for procedure following description of procedure and pros/cons explained.  Cleaned and draped per hospital procedure.  Pt positioned appropriately,  5cc  lidocaine with epinephrine was used as a local anesthetic. #11 blade scalpal used for two separate incision.  "Additional local anesthetic injected into surrounding viable tissue prior to blunt dissection of loculated adhesions. Copius drainage of pus (culture obtained). Wound packed with 1/2\" iodoform gauze. Procedure tolerated without complications. Wound dressed with sterile 4x4 guaze and paper tape.     FINAL IMPRESSION  Visit Diagnoses     ICD-10-CM   1. Abscess  L02.91   2. Cellulitis of buttock  L03.317     Electronically signed by: Pedro Rollins M.D., 4/5/2022 7:10 PM  "

## 2022-04-06 NOTE — ED NOTES
Secured bulky dressing w/ tape per ERP    Pt signed and given d/c instructions. Discussed f/u and care for area. Provided supplies for home. Pt denies questions/concerns. Pt ambulated out of the dept w/ steady gait, A&O x4

## 2022-04-08 ENCOUNTER — HOSPITAL ENCOUNTER (EMERGENCY)
Facility: MEDICAL CENTER | Age: 32
End: 2022-04-08
Attending: EMERGENCY MEDICINE
Payer: COMMERCIAL

## 2022-04-08 VITALS
HEIGHT: 69 IN | RESPIRATION RATE: 16 BRPM | OXYGEN SATURATION: 97 % | HEART RATE: 102 BPM | BODY MASS INDEX: 18.02 KG/M2 | WEIGHT: 121.69 LBS | SYSTOLIC BLOOD PRESSURE: 120 MMHG | DIASTOLIC BLOOD PRESSURE: 84 MMHG | TEMPERATURE: 98 F

## 2022-04-08 DIAGNOSIS — L02.91 ABSCESS: ICD-10-CM

## 2022-04-08 DIAGNOSIS — L02.91 CUTANEOUS ABSCESS, UNSPECIFIED SITE: ICD-10-CM

## 2022-04-08 PROCEDURE — 99282 EMERGENCY DEPT VISIT SF MDM: CPT

## 2022-04-08 RX ORDER — AMOXICILLIN AND CLAVULANATE POTASSIUM 875; 125 MG/1; MG/1
1 TABLET, FILM COATED ORAL 2 TIMES DAILY
Qty: 14 TABLET | Refills: 0 | Status: SHIPPED | OUTPATIENT
Start: 2022-04-08 | End: 2022-04-08 | Stop reason: SDUPTHER

## 2022-04-08 RX ORDER — SULFAMETHOXAZOLE AND TRIMETHOPRIM 800; 160 MG/1; MG/1
1 TABLET ORAL 2 TIMES DAILY
Qty: 20 TABLET | Refills: 0 | Status: SHIPPED | OUTPATIENT
Start: 2022-04-08 | End: 2022-04-18

## 2022-04-08 RX ORDER — AMOXICILLIN AND CLAVULANATE POTASSIUM 875; 125 MG/1; MG/1
1 TABLET, FILM COATED ORAL 2 TIMES DAILY
Qty: 14 TABLET | Refills: 0 | Status: SHIPPED | OUTPATIENT
Start: 2022-04-08 | End: 2022-04-15

## 2022-04-08 ASSESSMENT — FIBROSIS 4 INDEX: FIB4 SCORE: 0.54

## 2022-04-09 NOTE — ED PROVIDER NOTES
ED Provider Note    CHIEF COMPLAINT  Chief Complaint   Patient presents with   • Wound Check     Several abscesses and was seen here 3/30 and 4/5 for drainage and wound checks.        HPI  Luna River is a 32 y.o. female who presents to the Emergency Department for a wound check, she was last seen here in the emergency department 3 days ago, she has a history of skin popping and had several abscesses drained.  The patient shows that she was placed on Bactrim.  She denies fevers chills or vomiting.  She tells me she has not been changing her dressings.  During her last visit she had reincision of the wounds and repacking.  She denies having any fevers or chills or vomiting.  Review of her culture showed that she did have Haemophilus parainfluenza on a abscess culture in February no recent cultures are identified    REVIEW OF SYSTEMS  As above, all other systems negative.  PAST MEDICAL HISTORY   has a past medical history of Anorexia, Bipolar 2 disorder (HCC), bulemia, Hepatitis C, Hepatitis C, Impetigo, Liver disease, Major depressive disorder, Mood disorder (HCC), MRSA infection, Oppositional defiant disorder, Polysubstance abuse (HCC), Post-traumatic stress disorder, Schizoaffective disorder, and Scoliosis.    SOCIAL HISTORY  Social History     Tobacco Use   • Smoking status: Current Every Day Smoker     Packs/day: 0.50     Years: 7.00     Pack years: 3.50     Types: Cigarettes   • Smokeless tobacco: Never Used   Vaping Use   • Vaping Use: Never used   Substance and Sexual Activity   • Alcohol use: Yes     Comment: rarely   • Drug use: Yes     Types: Intravenous, Injected (Skin Popping)     Comment: Heroin, fentanyl, meth   • Sexual activity: Not on file       SURGICAL HISTORY   has a past surgical history that includes tonsillectomy; removal of tonsils,<15 y/o; other; anthony by laparoscopy (2/1/2013); and irrigation & debridement general (Right, 2/23/2022).    CURRENT MEDICATIONS  Reviewed.  See Encounter  "Summary.  Include Bactrim    ALLERGIES  Allergies   Allergen Reactions   • Clonazepam      Pt states that she overdosed on it before. Furthermore, per historical, pt had seizure-like activity while taking Clonazepam as a child.   • Gabadone      Unknown reaction.   • Gabapentin      Causes seizures.   • Tiagabine Hydrochloride      Unknown reaction.       PHYSICAL EXAM  VITAL SIGNS: /84   Pulse (!) 102   Temp 36.7 °C (98 °F) (Temporal)   Resp 16   Ht 1.753 m (5' 9\")   Wt 55.2 kg (121 lb 11.1 oz)   LMP  (LMP Unknown)   SpO2 97%   Breastfeeding No   BMI 17.97 kg/m²   Constitutional:  Alert in no apparent distress.  HENT: Normocephalic, Bilateral external ears normal. Nose normal.   Eyes: Pupils are equal and reactive. Conjunctiva normal, non-icteric.   Thorax & Lungs: Easy unlabored respirations  Abdomen:  No gross signs of peritonitis, no pain with movement   Skin: Patient's left thigh has packing in place there is purulent drainage noted, there is no fullness, slight erythema is noted the dressing has not been changed for 3 days  Extremities:   No edema, No asymmetry  Neurologic: Alert, Grossly non-focal.   Psychiatric: Affect and Mood normal      COURSE & MEDICAL DECISION MAKING  Nursing notes and vital signs were reviewed. (See chart for details)    The patient presents to the Emergency Department with persistent drainage, this is not significantly improving I do not know if it is because she is not doing dressing changes in the bandaging was occluded or if she needs broader coverage.  Looking back through her old culture she did have Haemophilus parainfluenza in the past, the appropriate antibiotic listed for this organism is Augmentin.  I will expand her Bactrim to include Augmentin to cover for more skin maryellen.  She will return for recheck in 2 days and sooner if she has fevers chills or vomiting.  Patient know she needs to do daily dressing changes and if anything is worsening return " immediately     The patient verbally agreed to the discharge precautions and follow-up plan which is documented in EPIC.  DISPOSITION:    Patient will be discharged home in stable condition.    FOLLOW UP:  [facility], Emergency Dept  [address] [address] [phone]    return for wound check in 2 days, do dressing changes daily      OUTPATIENT MEDICATIONS:  Current Discharge Medication List      START taking these medications    Details   amoxicillin-clavulanate (AUGMENTIN) 875-125 MG Tab Take 1 Tablet by mouth 2 times a day for 7 days.  Qty: 14 Tablet, Refills: 0    Associated Diagnoses: Cutaneous abscess, unspecified site               FINAL IMPRESSION   1. Abscess    2. Cutaneous abscess, unspecified site

## 2022-04-09 NOTE — ED TRIAGE NOTES
".  Chief Complaint   Patient presents with   • Wound Check     Several abscesses and was seen here 3/30 and 4/5 for drainage and wound checks.      Pt ambulated to triage for above complaint.  Pt here tonight for wound check, abscess to right hip is bothering her with itchiness and soreness. She says she has been compliant with her bactrim. Denies fevers.     Pt educated on triage process and told to alert staff of any change in condition or concerns.    /79   Pulse (!) 111   Temp 36.4 °C (97.6 °F) (Temporal)   Resp 18   Ht 1.753 m (5' 9\")   Wt 55.2 kg (121 lb 11.1 oz)   LMP  (LMP Unknown)   SpO2 95%   Breastfeeding No   BMI 17.97 kg/m²       "

## 2022-04-09 NOTE — ED NOTES
"Pt discharged to lobby with steady gait, AOx4. Pt in possession of belongings. Pt provided discharge education and information pertaining to medications and follow up appointments. Pt received copy of discharge instructions and verbalized understanding. /84   Pulse (!) 102   Temp 36.7 °C (98 °F) (Temporal)   Resp 16   Ht 1.753 m (5' 9\")   Wt 55.2 kg (121 lb 11.1 oz)   LMP  (LMP Unknown)   SpO2 97%   Breastfeeding No   BMI 17.97 kg/m²   "

## 2022-06-08 ENCOUNTER — ANESTHESIA EVENT (OUTPATIENT)
Dept: SURGERY | Facility: MEDICAL CENTER | Age: 32
DRG: 872 | End: 2022-06-08
Payer: COMMERCIAL

## 2022-06-08 ENCOUNTER — ANESTHESIA (OUTPATIENT)
Dept: SURGERY | Facility: MEDICAL CENTER | Age: 32
DRG: 872 | End: 2022-06-08
Payer: COMMERCIAL

## 2022-06-08 ENCOUNTER — HOSPITAL ENCOUNTER (INPATIENT)
Facility: MEDICAL CENTER | Age: 32
LOS: 1 days | DRG: 872 | End: 2022-06-09
Attending: EMERGENCY MEDICINE | Admitting: STUDENT IN AN ORGANIZED HEALTH CARE EDUCATION/TRAINING PROGRAM
Payer: COMMERCIAL

## 2022-06-08 ENCOUNTER — APPOINTMENT (OUTPATIENT)
Dept: RADIOLOGY | Facility: MEDICAL CENTER | Age: 32
DRG: 872 | End: 2022-06-08
Attending: EMERGENCY MEDICINE
Payer: COMMERCIAL

## 2022-06-08 VITALS
DIASTOLIC BLOOD PRESSURE: 77 MMHG | TEMPERATURE: 98 F | WEIGHT: 127.65 LBS | RESPIRATION RATE: 18 BRPM | HEIGHT: 69 IN | BODY MASS INDEX: 18.91 KG/M2 | HEART RATE: 84 BPM | SYSTOLIC BLOOD PRESSURE: 113 MMHG | OXYGEN SATURATION: 99 %

## 2022-06-08 DIAGNOSIS — R00.0 TACHYCARDIA: ICD-10-CM

## 2022-06-08 DIAGNOSIS — L02.91 ABSCESS OF MULTIPLE SITES: Primary | ICD-10-CM

## 2022-06-08 PROBLEM — L02.419 LEG ABSCESS: Status: ACTIVE | Noted: 2022-06-08

## 2022-06-08 PROBLEM — I95.9 HYPOTENSION: Status: ACTIVE | Noted: 2022-06-08

## 2022-06-08 LAB
ALBUMIN SERPL BCP-MCNC: 3.1 G/DL (ref 3.2–4.9)
ALBUMIN/GLOB SERPL: 0.7 G/DL
ALP SERPL-CCNC: 135 U/L (ref 30–99)
ALT SERPL-CCNC: 25 U/L (ref 2–50)
ANION GAP SERPL CALC-SCNC: 11 MMOL/L (ref 7–16)
APTT PPP: 33 SEC (ref 24.7–36)
AST SERPL-CCNC: 36 U/L (ref 12–45)
BASOPHILS # BLD AUTO: 0.2 % (ref 0–1.8)
BASOPHILS # BLD: 0.02 K/UL (ref 0–0.12)
BILIRUB SERPL-MCNC: 0.2 MG/DL (ref 0.1–1.5)
BUN SERPL-MCNC: 11 MG/DL (ref 8–22)
CALCIUM SERPL-MCNC: 8.7 MG/DL (ref 8.5–10.5)
CHLORIDE SERPL-SCNC: 105 MMOL/L (ref 96–112)
CO2 SERPL-SCNC: 25 MMOL/L (ref 20–33)
CORTIS SERPL-MCNC: 4 UG/DL (ref 0–23)
CREAT SERPL-MCNC: 0.78 MG/DL (ref 0.5–1.4)
CRP SERPL HS-MCNC: 6.34 MG/DL (ref 0–0.75)
EOSINOPHIL # BLD AUTO: 0.01 K/UL (ref 0–0.51)
EOSINOPHIL NFR BLD: 0.1 % (ref 0–6.9)
ERYTHROCYTE [DISTWIDTH] IN BLOOD BY AUTOMATED COUNT: 43.4 FL (ref 35.9–50)
ERYTHROCYTE [SEDIMENTATION RATE] IN BLOOD BY WESTERGREN METHOD: 80 MM/HOUR (ref 0–25)
GFR SERPLBLD CREATININE-BSD FMLA CKD-EPI: 104 ML/MIN/1.73 M 2
GLOBULIN SER CALC-MCNC: 4.3 G/DL (ref 1.9–3.5)
GLUCOSE SERPL-MCNC: 85 MG/DL (ref 65–99)
HCG SERPL QL: NEGATIVE
HCT VFR BLD AUTO: 32.5 % (ref 37–47)
HGB BLD-MCNC: 10.5 G/DL (ref 12–16)
IMM GRANULOCYTES # BLD AUTO: 0.09 K/UL (ref 0–0.11)
IMM GRANULOCYTES NFR BLD AUTO: 0.9 % (ref 0–0.9)
INR PPP: 1.06 (ref 0.87–1.13)
LACTATE BLD-SCNC: 0.9 MMOL/L (ref 0.5–2)
LACTATE BLD-SCNC: 1.1 MMOL/L (ref 0.5–2)
LACTATE BLD-SCNC: 1.2 MMOL/L (ref 0.5–2)
LYMPHOCYTES # BLD AUTO: 3.02 K/UL (ref 1–4.8)
LYMPHOCYTES NFR BLD: 30.3 % (ref 22–41)
MCH RBC QN AUTO: 27.6 PG (ref 27–33)
MCHC RBC AUTO-ENTMCNC: 32.3 G/DL (ref 33.6–35)
MCV RBC AUTO: 85.3 FL (ref 81.4–97.8)
MONOCYTES # BLD AUTO: 0.57 K/UL (ref 0–0.85)
MONOCYTES NFR BLD AUTO: 5.7 % (ref 0–13.4)
NEUTROPHILS # BLD AUTO: 6.25 K/UL (ref 2–7.15)
NEUTROPHILS NFR BLD: 62.8 % (ref 44–72)
NRBC # BLD AUTO: 0 K/UL
NRBC BLD-RTO: 0 /100 WBC
PLATELET # BLD AUTO: 357 K/UL (ref 164–446)
PMV BLD AUTO: 9.1 FL (ref 9–12.9)
POTASSIUM SERPL-SCNC: 4.3 MMOL/L (ref 3.6–5.5)
PROCALCITONIN SERPL-MCNC: 0.17 NG/ML
PROT SERPL-MCNC: 7.4 G/DL (ref 6–8.2)
PROTHROMBIN TIME: 13.5 SEC (ref 12–14.6)
RBC # BLD AUTO: 3.81 M/UL (ref 4.2–5.4)
SODIUM SERPL-SCNC: 141 MMOL/L (ref 135–145)
WBC # BLD AUTO: 10 K/UL (ref 4.8–10.8)

## 2022-06-08 PROCEDURE — 160027 HCHG SURGERY MINUTES - 1ST 30 MINS LEVEL 2: Performed by: ORTHOPAEDIC SURGERY

## 2022-06-08 PROCEDURE — 87205 SMEAR GRAM STAIN: CPT | Mod: 91

## 2022-06-08 PROCEDURE — 36415 COLL VENOUS BLD VENIPUNCTURE: CPT

## 2022-06-08 PROCEDURE — 85730 THROMBOPLASTIN TIME PARTIAL: CPT

## 2022-06-08 PROCEDURE — 99291 CRITICAL CARE FIRST HOUR: CPT

## 2022-06-08 PROCEDURE — 84703 CHORIONIC GONADOTROPIN ASSAY: CPT

## 2022-06-08 PROCEDURE — 700111 HCHG RX REV CODE 636 W/ 250 OVERRIDE (IP): Performed by: EMERGENCY MEDICINE

## 2022-06-08 PROCEDURE — 27301 DRAIN THIGH/KNEE LESION: CPT | Mod: 50ROC | Performed by: ORTHOPAEDIC SURGERY

## 2022-06-08 PROCEDURE — 770001 HCHG ROOM/CARE - MED/SURG/GYN PRIV*

## 2022-06-08 PROCEDURE — 96366 THER/PROPH/DIAG IV INF ADDON: CPT

## 2022-06-08 PROCEDURE — 700105 HCHG RX REV CODE 258: Performed by: EMERGENCY MEDICINE

## 2022-06-08 PROCEDURE — 160038 HCHG SURGERY MINUTES - EA ADDL 1 MIN LEVEL 2: Performed by: ORTHOPAEDIC SURGERY

## 2022-06-08 PROCEDURE — 700111 HCHG RX REV CODE 636 W/ 250 OVERRIDE (IP): Performed by: ANESTHESIOLOGY

## 2022-06-08 PROCEDURE — 71045 X-RAY EXAM CHEST 1 VIEW: CPT

## 2022-06-08 PROCEDURE — 160002 HCHG RECOVERY MINUTES (STAT): Performed by: ORTHOPAEDIC SURGERY

## 2022-06-08 PROCEDURE — 94760 N-INVAS EAR/PLS OXIMETRY 1: CPT

## 2022-06-08 PROCEDURE — 87070 CULTURE OTHR SPECIMN AEROBIC: CPT

## 2022-06-08 PROCEDURE — 700111 HCHG RX REV CODE 636 W/ 250 OVERRIDE (IP): Performed by: STUDENT IN AN ORGANIZED HEALTH CARE EDUCATION/TRAINING PROGRAM

## 2022-06-08 PROCEDURE — 99223 1ST HOSP IP/OBS HIGH 75: CPT | Mod: AI | Performed by: STUDENT IN AN ORGANIZED HEALTH CARE EDUCATION/TRAINING PROGRAM

## 2022-06-08 PROCEDURE — A9270 NON-COVERED ITEM OR SERVICE: HCPCS | Performed by: INTERNAL MEDICINE

## 2022-06-08 PROCEDURE — 160048 HCHG OR STATISTICAL LEVEL 1-5: Performed by: ORTHOPAEDIC SURGERY

## 2022-06-08 PROCEDURE — 700102 HCHG RX REV CODE 250 W/ 637 OVERRIDE(OP): Performed by: ANESTHESIOLOGY

## 2022-06-08 PROCEDURE — 700105 HCHG RX REV CODE 258: Performed by: ANESTHESIOLOGY

## 2022-06-08 PROCEDURE — 84145 PROCALCITONIN (PCT): CPT

## 2022-06-08 PROCEDURE — 700101 HCHG RX REV CODE 250: Performed by: ORTHOPAEDIC SURGERY

## 2022-06-08 PROCEDURE — A9270 NON-COVERED ITEM OR SERVICE: HCPCS | Performed by: ANESTHESIOLOGY

## 2022-06-08 PROCEDURE — 87077 CULTURE AEROBIC IDENTIFY: CPT | Mod: 91

## 2022-06-08 PROCEDURE — 83605 ASSAY OF LACTIC ACID: CPT

## 2022-06-08 PROCEDURE — 80053 COMPREHEN METABOLIC PANEL: CPT

## 2022-06-08 PROCEDURE — 96365 THER/PROPH/DIAG IV INF INIT: CPT

## 2022-06-08 PROCEDURE — 700105 HCHG RX REV CODE 258: Performed by: STUDENT IN AN ORGANIZED HEALTH CARE EDUCATION/TRAINING PROGRAM

## 2022-06-08 PROCEDURE — 27603 DRAIN LOWER LEG LESION: CPT | Mod: RT | Performed by: ORTHOPAEDIC SURGERY

## 2022-06-08 PROCEDURE — 700101 HCHG RX REV CODE 250: Performed by: ANESTHESIOLOGY

## 2022-06-08 PROCEDURE — 99223 1ST HOSP IP/OBS HIGH 75: CPT | Performed by: ORTHOPAEDIC SURGERY

## 2022-06-08 PROCEDURE — 87040 BLOOD CULTURE FOR BACTERIA: CPT | Mod: 91

## 2022-06-08 PROCEDURE — 160035 HCHG PACU - 1ST 60 MINS PHASE I: Performed by: ORTHOPAEDIC SURGERY

## 2022-06-08 PROCEDURE — 85610 PROTHROMBIN TIME: CPT

## 2022-06-08 PROCEDURE — 82533 TOTAL CORTISOL: CPT

## 2022-06-08 PROCEDURE — 160009 HCHG ANES TIME/MIN: Performed by: ORTHOPAEDIC SURGERY

## 2022-06-08 PROCEDURE — 160036 HCHG PACU - EA ADDL 30 MINS PHASE I: Performed by: ORTHOPAEDIC SURGERY

## 2022-06-08 PROCEDURE — 87186 SC STD MICRODIL/AGAR DIL: CPT

## 2022-06-08 PROCEDURE — 87147 CULTURE TYPE IMMUNOLOGIC: CPT

## 2022-06-08 PROCEDURE — 85025 COMPLETE CBC W/AUTO DIFF WBC: CPT

## 2022-06-08 PROCEDURE — 87075 CULTR BACTERIA EXCEPT BLOOD: CPT | Mod: 91

## 2022-06-08 PROCEDURE — 00400 ANES INTEGUMENTARY SYS NOS: CPT | Performed by: ANESTHESIOLOGY

## 2022-06-08 PROCEDURE — 96367 TX/PROPH/DG ADDL SEQ IV INF: CPT

## 2022-06-08 PROCEDURE — 85652 RBC SED RATE AUTOMATED: CPT

## 2022-06-08 PROCEDURE — 700102 HCHG RX REV CODE 250 W/ 637 OVERRIDE(OP): Performed by: INTERNAL MEDICINE

## 2022-06-08 PROCEDURE — 86140 C-REACTIVE PROTEIN: CPT

## 2022-06-08 RX ORDER — HYDROMORPHONE HYDROCHLORIDE 1 MG/ML
0.4 INJECTION, SOLUTION INTRAMUSCULAR; INTRAVENOUS; SUBCUTANEOUS
Status: DISCONTINUED | OUTPATIENT
Start: 2022-06-08 | End: 2022-06-08 | Stop reason: HOSPADM

## 2022-06-08 RX ORDER — SODIUM CHLORIDE, SODIUM LACTATE, POTASSIUM CHLORIDE, CALCIUM CHLORIDE 600; 310; 30; 20 MG/100ML; MG/100ML; MG/100ML; MG/100ML
INJECTION, SOLUTION INTRAVENOUS
Status: DISCONTINUED | OUTPATIENT
Start: 2022-06-08 | End: 2022-06-08 | Stop reason: SURG

## 2022-06-08 RX ORDER — ONDANSETRON 2 MG/ML
4 INJECTION INTRAMUSCULAR; INTRAVENOUS
Status: DISCONTINUED | OUTPATIENT
Start: 2022-06-08 | End: 2022-06-08 | Stop reason: HOSPADM

## 2022-06-08 RX ORDER — MAGNESIUM HYDROXIDE 1200 MG/15ML
LIQUID ORAL
Status: COMPLETED | OUTPATIENT
Start: 2022-06-08 | End: 2022-06-08

## 2022-06-08 RX ORDER — PROMETHAZINE HYDROCHLORIDE 25 MG/1
12.5-25 TABLET ORAL EVERY 4 HOURS PRN
Status: DISCONTINUED | OUTPATIENT
Start: 2022-06-08 | End: 2022-06-09 | Stop reason: HOSPADM

## 2022-06-08 RX ORDER — MEPERIDINE HYDROCHLORIDE 25 MG/ML
12.5 INJECTION INTRAMUSCULAR; INTRAVENOUS; SUBCUTANEOUS
Status: DISCONTINUED | OUTPATIENT
Start: 2022-06-08 | End: 2022-06-08 | Stop reason: HOSPADM

## 2022-06-08 RX ORDER — ONDANSETRON 2 MG/ML
4 INJECTION INTRAMUSCULAR; INTRAVENOUS EVERY 4 HOURS PRN
Status: DISCONTINUED | OUTPATIENT
Start: 2022-06-08 | End: 2022-06-09 | Stop reason: HOSPADM

## 2022-06-08 RX ORDER — DIPHENHYDRAMINE HYDROCHLORIDE 50 MG/ML
12.5 INJECTION INTRAMUSCULAR; INTRAVENOUS
Status: DISCONTINUED | OUTPATIENT
Start: 2022-06-08 | End: 2022-06-08 | Stop reason: HOSPADM

## 2022-06-08 RX ORDER — AMOXICILLIN 250 MG
2 CAPSULE ORAL 2 TIMES DAILY
Status: DISCONTINUED | OUTPATIENT
Start: 2022-06-08 | End: 2022-06-09 | Stop reason: HOSPADM

## 2022-06-08 RX ORDER — BISACODYL 10 MG
10 SUPPOSITORY, RECTAL RECTAL
Status: DISCONTINUED | OUTPATIENT
Start: 2022-06-08 | End: 2022-06-09 | Stop reason: HOSPADM

## 2022-06-08 RX ORDER — PROCHLORPERAZINE EDISYLATE 5 MG/ML
5-10 INJECTION INTRAMUSCULAR; INTRAVENOUS EVERY 4 HOURS PRN
Status: DISCONTINUED | OUTPATIENT
Start: 2022-06-08 | End: 2022-06-09 | Stop reason: HOSPADM

## 2022-06-08 RX ORDER — ONDANSETRON 2 MG/ML
INJECTION INTRAMUSCULAR; INTRAVENOUS PRN
Status: DISCONTINUED | OUTPATIENT
Start: 2022-06-08 | End: 2022-06-08 | Stop reason: SURG

## 2022-06-08 RX ORDER — NICOTINE 21 MG/24HR
14 PATCH, TRANSDERMAL 24 HOURS TRANSDERMAL
Status: DISCONTINUED | OUTPATIENT
Start: 2022-06-08 | End: 2022-06-09 | Stop reason: HOSPADM

## 2022-06-08 RX ORDER — ACETAMINOPHEN 325 MG/1
650 TABLET ORAL EVERY 6 HOURS PRN
Status: DISCONTINUED | OUTPATIENT
Start: 2022-06-08 | End: 2022-06-09 | Stop reason: HOSPADM

## 2022-06-08 RX ORDER — LIDOCAINE HYDROCHLORIDE 20 MG/ML
INJECTION, SOLUTION EPIDURAL; INFILTRATION; INTRACAUDAL; PERINEURAL PRN
Status: DISCONTINUED | OUTPATIENT
Start: 2022-06-08 | End: 2022-06-08 | Stop reason: SURG

## 2022-06-08 RX ORDER — PROMETHAZINE HYDROCHLORIDE 25 MG/1
12.5-25 SUPPOSITORY RECTAL EVERY 4 HOURS PRN
Status: DISCONTINUED | OUTPATIENT
Start: 2022-06-08 | End: 2022-06-09 | Stop reason: HOSPADM

## 2022-06-08 RX ORDER — ALBUTEROL SULFATE 2.5 MG/3ML
2.5 SOLUTION RESPIRATORY (INHALATION)
Status: DISCONTINUED | OUTPATIENT
Start: 2022-06-08 | End: 2022-06-08 | Stop reason: HOSPADM

## 2022-06-08 RX ORDER — ONDANSETRON 4 MG/1
4 TABLET, ORALLY DISINTEGRATING ORAL EVERY 4 HOURS PRN
Status: DISCONTINUED | OUTPATIENT
Start: 2022-06-08 | End: 2022-06-09 | Stop reason: HOSPADM

## 2022-06-08 RX ORDER — HEPARIN SODIUM 5000 [USP'U]/ML
5000 INJECTION, SOLUTION INTRAVENOUS; SUBCUTANEOUS EVERY 8 HOURS
Status: DISCONTINUED | OUTPATIENT
Start: 2022-06-08 | End: 2022-06-09 | Stop reason: HOSPADM

## 2022-06-08 RX ORDER — POLYETHYLENE GLYCOL 3350 17 G/17G
1 POWDER, FOR SOLUTION ORAL
Status: DISCONTINUED | OUTPATIENT
Start: 2022-06-08 | End: 2022-06-09 | Stop reason: HOSPADM

## 2022-06-08 RX ORDER — OXYCODONE HCL 5 MG/5 ML
5 SOLUTION, ORAL ORAL
Status: COMPLETED | OUTPATIENT
Start: 2022-06-08 | End: 2022-06-08

## 2022-06-08 RX ORDER — HYDRALAZINE HYDROCHLORIDE 20 MG/ML
5 INJECTION INTRAMUSCULAR; INTRAVENOUS
Status: DISCONTINUED | OUTPATIENT
Start: 2022-06-08 | End: 2022-06-08 | Stop reason: HOSPADM

## 2022-06-08 RX ORDER — SODIUM CHLORIDE, SODIUM LACTATE, POTASSIUM CHLORIDE, AND CALCIUM CHLORIDE .6; .31; .03; .02 G/100ML; G/100ML; G/100ML; G/100ML
30 INJECTION, SOLUTION INTRAVENOUS ONCE
Status: COMPLETED | OUTPATIENT
Start: 2022-06-08 | End: 2022-06-08

## 2022-06-08 RX ORDER — HYDROMORPHONE HYDROCHLORIDE 1 MG/ML
0.5 INJECTION, SOLUTION INTRAMUSCULAR; INTRAVENOUS; SUBCUTANEOUS
Status: DISCONTINUED | OUTPATIENT
Start: 2022-06-08 | End: 2022-06-08 | Stop reason: HOSPADM

## 2022-06-08 RX ORDER — HYDROMORPHONE HYDROCHLORIDE 1 MG/ML
0.2 INJECTION, SOLUTION INTRAMUSCULAR; INTRAVENOUS; SUBCUTANEOUS
Status: DISCONTINUED | OUTPATIENT
Start: 2022-06-08 | End: 2022-06-08 | Stop reason: HOSPADM

## 2022-06-08 RX ORDER — HYDRALAZINE HYDROCHLORIDE 20 MG/ML
10 INJECTION INTRAMUSCULAR; INTRAVENOUS EVERY 4 HOURS PRN
Status: DISCONTINUED | OUTPATIENT
Start: 2022-06-08 | End: 2022-06-09 | Stop reason: HOSPADM

## 2022-06-08 RX ORDER — SODIUM CHLORIDE 9 MG/ML
INJECTION, SOLUTION INTRAVENOUS CONTINUOUS
Status: DISCONTINUED | OUTPATIENT
Start: 2022-06-08 | End: 2022-06-09 | Stop reason: HOSPADM

## 2022-06-08 RX ORDER — TRAMADOL HYDROCHLORIDE 50 MG/1
50 TABLET ORAL EVERY 6 HOURS PRN
Status: DISCONTINUED | OUTPATIENT
Start: 2022-06-08 | End: 2022-06-09 | Stop reason: HOSPADM

## 2022-06-08 RX ORDER — OXYCODONE HCL 5 MG/5 ML
10 SOLUTION, ORAL ORAL
Status: COMPLETED | OUTPATIENT
Start: 2022-06-08 | End: 2022-06-08

## 2022-06-08 RX ORDER — SODIUM CHLORIDE, SODIUM LACTATE, POTASSIUM CHLORIDE, CALCIUM CHLORIDE 600; 310; 30; 20 MG/100ML; MG/100ML; MG/100ML; MG/100ML
INJECTION, SOLUTION INTRAVENOUS CONTINUOUS
Status: DISCONTINUED | OUTPATIENT
Start: 2022-06-08 | End: 2022-06-08 | Stop reason: HOSPADM

## 2022-06-08 RX ORDER — DEXAMETHASONE SODIUM PHOSPHATE 4 MG/ML
INJECTION, SOLUTION INTRA-ARTICULAR; INTRALESIONAL; INTRAMUSCULAR; INTRAVENOUS; SOFT TISSUE PRN
Status: DISCONTINUED | OUTPATIENT
Start: 2022-06-08 | End: 2022-06-08 | Stop reason: SURG

## 2022-06-08 RX ADMIN — SODIUM CHLORIDE, POTASSIUM CHLORIDE, SODIUM LACTATE AND CALCIUM CHLORIDE: 600; 310; 30; 20 INJECTION, SOLUTION INTRAVENOUS at 13:42

## 2022-06-08 RX ADMIN — VANCOMYCIN HYDROCHLORIDE 1500 MG: 500 INJECTION, POWDER, LYOPHILIZED, FOR SOLUTION INTRAVENOUS at 06:00

## 2022-06-08 RX ADMIN — PROPOFOL 100 MG: 10 INJECTION, EMULSION INTRAVENOUS at 13:49

## 2022-06-08 RX ADMIN — PROPOFOL 100 MG: 10 INJECTION, EMULSION INTRAVENOUS at 13:51

## 2022-06-08 RX ADMIN — FENTANYL CITRATE 100 MCG: 50 INJECTION, SOLUTION INTRAMUSCULAR; INTRAVENOUS at 14:15

## 2022-06-08 RX ADMIN — CEFEPIME 2 G: 2 INJECTION, POWDER, FOR SOLUTION INTRAVENOUS at 10:37

## 2022-06-08 RX ADMIN — OXYCODONE HYDROCHLORIDE 10 MG: 5 SOLUTION ORAL at 14:36

## 2022-06-08 RX ADMIN — ONDANSETRON 4 MG: 2 INJECTION INTRAMUSCULAR; INTRAVENOUS at 13:55

## 2022-06-08 RX ADMIN — CEFEPIME 2 G: 2 INJECTION, POWDER, FOR SOLUTION INTRAVENOUS at 18:28

## 2022-06-08 RX ADMIN — PROPOFOL 200 MG: 10 INJECTION, EMULSION INTRAVENOUS at 13:42

## 2022-06-08 RX ADMIN — FENTANYL CITRATE 50 MCG: 50 INJECTION, SOLUTION INTRAMUSCULAR; INTRAVENOUS at 14:28

## 2022-06-08 RX ADMIN — SODIUM CHLORIDE, POTASSIUM CHLORIDE, SODIUM LACTATE AND CALCIUM CHLORIDE 1737 ML: 600; 310; 30; 20 INJECTION, SOLUTION INTRAVENOUS at 10:00

## 2022-06-08 RX ADMIN — SODIUM CHLORIDE: 9 INJECTION, SOLUTION INTRAVENOUS at 09:00

## 2022-06-08 RX ADMIN — PIPERACILLIN AND TAZOBACTAM 4.5 G: 4; .5 INJECTION, POWDER, LYOPHILIZED, FOR SOLUTION INTRAVENOUS; PARENTERAL at 06:00

## 2022-06-08 RX ADMIN — FENTANYL CITRATE 100 MCG: 50 INJECTION, SOLUTION INTRAMUSCULAR; INTRAVENOUS at 13:55

## 2022-06-08 RX ADMIN — VANCOMYCIN HYDROCHLORIDE 750 MG: 500 INJECTION, POWDER, LYOPHILIZED, FOR SOLUTION INTRAVENOUS at 18:28

## 2022-06-08 RX ADMIN — FENTANYL CITRATE 100 MCG: 50 INJECTION, SOLUTION INTRAMUSCULAR; INTRAVENOUS at 13:45

## 2022-06-08 RX ADMIN — DEXAMETHASONE SODIUM PHOSPHATE 4 MG: 4 INJECTION, SOLUTION INTRA-ARTICULAR; INTRALESIONAL; INTRAMUSCULAR; INTRAVENOUS; SOFT TISSUE at 13:55

## 2022-06-08 RX ADMIN — LIDOCAINE HYDROCHLORIDE 50 MG: 20 INJECTION, SOLUTION EPIDURAL; INFILTRATION; INTRACAUDAL at 13:42

## 2022-06-08 ASSESSMENT — ENCOUNTER SYMPTOMS
MYALGIAS: 0
ABDOMINAL PAIN: 0
SORE THROAT: 0
CHILLS: 0
DIARRHEA: 0
SHORTNESS OF BREATH: 0
FEVER: 0
DIZZINESS: 0
VOMITING: 0
NAUSEA: 0
COUGH: 0
EYES NEGATIVE: 1
HEADACHES: 0

## 2022-06-08 ASSESSMENT — PATIENT HEALTH QUESTIONNAIRE - PHQ9
2. FEELING DOWN, DEPRESSED, IRRITABLE, OR HOPELESS: NOT AT ALL
SUM OF ALL RESPONSES TO PHQ9 QUESTIONS 1 AND 2: 0
1. LITTLE INTEREST OR PLEASURE IN DOING THINGS: NOT AT ALL

## 2022-06-08 ASSESSMENT — LIFESTYLE VARIABLES
DOES PATIENT WANT TO STOP DRINKING: NO
CONSUMPTION TOTAL: NEGATIVE
TOTAL SCORE: 0
TOTAL SCORE: 0
ALCOHOL_USE: NO
EVER HAD A DRINK FIRST THING IN THE MORNING TO STEADY YOUR NERVES TO GET RID OF A HANGOVER: NO
ON A TYPICAL DAY WHEN YOU DRINK ALCOHOL HOW MANY DRINKS DO YOU HAVE: 0
SUBSTANCE_ABUSE: 1
HOW MANY TIMES IN THE PAST YEAR HAVE YOU HAD 5 OR MORE DRINKS IN A DAY: 0
HAVE YOU EVER FELT YOU SHOULD CUT DOWN ON YOUR DRINKING: NO
TOTAL SCORE: 0
AVERAGE NUMBER OF DAYS PER WEEK YOU HAVE A DRINK CONTAINING ALCOHOL: 0
HAVE PEOPLE ANNOYED YOU BY CRITICIZING YOUR DRINKING: NO
EVER FELT BAD OR GUILTY ABOUT YOUR DRINKING: NO

## 2022-06-08 ASSESSMENT — PAIN DESCRIPTION - PAIN TYPE
TYPE: SURGICAL PAIN

## 2022-06-08 ASSESSMENT — FIBROSIS 4 INDEX: FIB4 SCORE: 0.54

## 2022-06-08 NOTE — ANESTHESIA PROCEDURE NOTES
Airway    Date/Time: 6/8/2022 1:43 PM  Performed by: Sherine Aldana M.D.  Authorized by: Sherine Aldana M.D.     Location:  OR  Urgency:  Elective  Indications for Airway Management:  Anesthesia      Spontaneous Ventilation: absent    Sedation Level:  Deep  Preoxygenated: Yes    Final Airway Type:  Supraglottic airway  Final Supraglottic Airway:  Standard LMA    SGA Size:  4  Number of Attempts at Approach:  1

## 2022-06-08 NOTE — ANESTHESIA POSTPROCEDURE EVALUATION
Patient: Luna River    Procedure Summary     Date: 06/08/22 Room / Location: [place] [address] / SURGERY [facility]    Anesthesia Start: 1340 Anesthesia Stop: 1430    Procedure: IRRIGATION AND DEBRIDEMENT, MULTIPLE WOUND BILATERAL LOWER EXTREMITIES (Bilateral Leg Upper) Diagnosis: (bilateral lower extremity abscesses )    Surgeons: Dakota Cortes M.D. Responsible Provider: Sherine Aldana M.D.    Anesthesia Type: general ASA Status: 3          Final Anesthesia Type: general  Last vitals  BP   Blood Pressure: 105/56    Temp   36.6 °C (97.8 °F)    Pulse   86   Resp   16    SpO2   96 %      Anesthesia Post Evaluation    Patient location during evaluation: PACU  Patient participation: complete - patient participated  Level of consciousness: awake and alert    Airway patency: patent  Anesthetic complications: no  Cardiovascular status: hemodynamically stable  Respiratory status: acceptable  Hydration status: euvolemic    PONV: none          No complications documented.     Nurse Pain Score: 7 (NPRS)

## 2022-06-08 NOTE — ANESTHESIA TIME REPORT
Anesthesia Start and Stop Event Times     Date Time Event    6/8/2022 1324 Ready for Procedure     1340 Anesthesia Start     1430 Anesthesia Stop        Responsible Staff  06/08/22    Name Role Begin End    Sherine Aldana M.D. Anesth 1340 1430        Overtime Reason:  no overtime (within assigned shift)    Comments:

## 2022-06-08 NOTE — DOCUMENTATION QUERY
[facility]                                                                       Query Response Note      PATIENT:               PABLO BOOTH  ACCT #:                  [id number]  MRN:                     [id number]  :                      1990  ADMIT DATE:       2022 4:07 AM  DISCH DATE:          RESPONDING  PROVIDER #:        [id number]           QUERY TEXT:    The diagnosis of sepsis has been documented in the H&P. Please provide additional clinical indicators/SIRS criteria supportive of the documented diagnosis of sepsis.     Note: If an appropriate response is not listed below, please respond with a new note.      The patient's Clinical Indicators include:   SIRS Criteria POA- HR: 119. WBC: 10, RR: 15, Temp.: 36.6C, Lactic Acid: 0.9, Procalcitonin: 0.17, and CRP: 6.34 on admission. BP: 130/92 on admission with decrease to 81/45 on  at 0800; pt given LR bolus.    Treatments include: Cefepime, Zosyn, Vancomycin, and LR Bolus.    Risk factors include: dx BLE Abscess w/hx IVDA, hx Hepatitis C, and hx MRSA.    Thank you,  Phong Carlson RN, BSN  Clinical   Connect via [employer]  Options provided:   -- Sepsis is ruled out and SIRS d/t general medical condition is ruled in   -- Sepsis exists, Please document additional SIRS criteria and clinical indicators to support this diagnosis   -- Other explanation, Please specify   -- Unable to determine      Query created by: Phong Carlson on 2022 12:28 PM    RESPONSE TEXT:    Unable to determine          Electronically signed by:  KARISHMA HIDALGO MD 2022 12:58 PM

## 2022-06-08 NOTE — ED PROVIDER NOTES
ED Provider Note    Scribed for CB Lee II* by Lyubov Kothari. 6/8/2022  4:33 AM    Means of Arrival: walk-in  History obtained by: patient  Limitations: none    CHIEF COMPLAINT  Chief Complaint   Patient presents with   • Abscess     Pt has several abscesses at various sites and has lanced a few herself. The ones she has lanced herself have pus oozing from them per pt.        HPI  Luna River is a 32 y.o. female who presents to the Emergency Department for evaluation of multiple abscess along her bilateral legs onset unknown. She states that she drained one of the abscess herself. She states that they have drainage and they are all extremely painful to touch. She denies chest pain, shortness of breath, fever or chills. No alleviating or exacerbating factors were reported. The last time she used IV drugs was about a week ago. She has a history of MRSA.     REVIEW OF SYSTEMS  Review of Systems   Constitutional: Negative for chills and fever.   Respiratory: Negative for shortness of breath.    Cardiovascular: Negative for chest pain.   Skin:        Multiple abscess along left leg, with surrounding erythema and pain   All other systems reviewed and are negative.    See HPI for further details.     PAST MEDICAL HISTORY   has a past medical history of Anorexia, Bipolar 2 disorder (HCC), bulemia, Hepatitis C, Hepatitis C, Impetigo, Liver disease, Major depressive disorder, Mood disorder (HCC), MRSA infection, Oppositional defiant disorder, Polysubstance abuse (HCC), Post-traumatic stress disorder, Schizoaffective disorder, and Scoliosis.    SOCIAL HISTORY  Social History     Tobacco Use   • Smoking status: Current Every Day Smoker     Packs/day: 0.25     Years: 7.00     Pack years: 1.75     Types: Cigarettes   • Smokeless tobacco: Never Used   Vaping Use   • Vaping Use: Never used   Substance and Sexual Activity   • Alcohol use: Not Currently     Comment: rarely   • Drug use: Yes     Types:  "Intravenous, Injected (Skin Popping)     Comment: Heroin, fentanyl, meth   • Sexual activity: Not reported       SURGICAL HISTORY   has a past surgical history that includes tonsillectomy; removal of tonsils,<15 y/o; other; anthony by laparoscopy (2/1/2013); and irrigation & debridement general (Right, 2/23/2022).    CURRENT MEDICATIONS  Home Medications     Reviewed by Delmy Clemons (Pharmacy Tech) on 06/08/22 at 0631  Med List Status: Complete   Medication Last Dose Status        Patient Juan Taking any Medications                       ALLERGIES  Allergies   Allergen Reactions   • Clonazepam Unspecified     Pt states that she overdosed on it before. Furthermore, per historical, pt had seizure-like activity while taking Clonazepam as a child.   • Gabapentin Unspecified     Causes seizures.   • Gabadone Unspecified     Unknown reaction.   • Tiagabine Hydrochloride Unspecified     Unknown reaction.       PHYSICAL EXAM  VITAL SIGNS: BP (!) 130/92   Pulse (!) 119   Temp 36.6 °C (97.8 °F) (Temporal)   Resp 15   Ht 1.753 m (5' 9\")   Wt 57.9 kg (127 lb 10.3 oz)   SpO2 93%   BMI 18.85 kg/m²    Pulse ox interpretation: I interpret this pulse ox as normal.  Constitutional: Pleasant 32 y.o. female laying on ED bed in no apparent distress.   HENT: Normocephalic, Atraumatic, Bilateral external ears normal. Nose normal.   Neck: Supple, no stridor.   Eyes: Pupils are equal. Conjunctiva normal, non-icteric.   Heart: Regular rate and rhythm, no murmurs.    Lungs: Normal respiratory effort Clear to auscultation bilaterally.  Abdomen: Normal appearance, nondistended, nontender.  Skin: Multiple areas of wounds at different stages of healing  Neurologic: Alert, Grossly non-focal.   MSK: Right upper thigh abscess with three opening and purulent drainage. Right distal lateral thigh with 3 cm of erythema and induration. Left posterior calf with tenderness and central area of induration and fluctuance. Behind left knee there " "is 2 cm to 3 cm area of induration and fluctuance. Left lateral thigh has 10 cm area of erythema with two localized regions within that of fluctuance. Tenderness at right posterior calf with 2 cm area of induration. Left index finger with circumferential erythema with area of raised fluctuance on palmar surface of middle phalanx.   Psychiatric:  Appears appropriate and not intoxicated.     DIAGNOSTIC STUDIES / PROCEDURES    LABS  Pertinent Labs & Imaging studies reviewed. (See chart for details)  Labs Reviewed   CBC WITH DIFFERENTIAL - Abnormal; Notable for the following components:       Result Value    RBC 3.81 (*)     Hemoglobin 10.5 (*)     Hematocrit 32.5 (*)     MCHC 32.3 (*)     All other components within normal limits    Narrative:     Indicate which anticoagulants the patient is on:->NONE   COMP METABOLIC PANEL - Abnormal; Notable for the following components:    Alkaline Phosphatase 135 (*)     Albumin 3.1 (*)     Globulin 4.3 (*)     All other components within normal limits    Narrative:     Indicate which anticoagulants the patient is on:->NONE   CRP QUANTITIVE (NON-CARDIAC) - Abnormal; Notable for the following components:    Stat C-Reactive Protein 6.34 (*)     All other components within normal limits    Narrative:     Indicate which anticoagulants the patient is on:->NONE   LACTIC ACID   PROTHROMBIN TIME    Narrative:     Indicate which anticoagulants the patient is on:->NONE   APTT    Narrative:     Indicate which anticoagulants the patient is on:->NONE   HCG QUAL SERUM    Narrative:     Indicate which anticoagulants the patient is on:->NONE   ESTIMATED GFR    Narrative:     Indicate which anticoagulants the patient is on:->NONE   BLOOD CULTURE    Narrative:     1 of 2 for Blood Culture x 2 sites order. Per Hospital  Policy: Only change Specimen Src: to \"Line\" if specified by  physician order.   BLOOD CULTURE    Narrative:     2 of 2 blood culture x2  Sites order. Per Hospital Policy:  Only change " "Specimen Src: to \"Line\" if specified by physician  order.   BLOOD CULTURE   URINALYSIS   URINE CULTURE(NEW)   SED RATE    Narrative:     Indicate which anticoagulants the patient is on:->NONE   URINE DRUG SCREEN     RADIOLOGY  DX-CHEST-PORTABLE (1 VIEW)   Final Result      No acute cardiopulmonary abnormality.           Pertinent Labs & Imaging studies reviewed. (See chart for details)    COURSE & MEDICAL DECISION MAKING  Pertinent Labs & Imaging studies reviewed. (See chart for details)    4:33 AM This is an emergent evaluation of a 32 y.o., female who presents with multiple abscess and the differential diagnosis includes but is not limited to multiple abscess, endocarditis, sepsis. Ordered for DX-Chest, Blood Cultures, Lactic Acid, CMP, CBC with diff, Sed Rate, CRP, APTT, INR, Urine Culture, UA, and Beta HCG Qual to evaluate. Paged Orthopedics.    5:02 AM I discussed the patient's case and the above findings with Dr. Cortes (Orthopedics) who agrees to evaluate the patient. They asked for patient to be NPO. She will be admitted into the hospital service by the Hospitalist.     5:30 AM Patient will be treated with vancomycin and Zosyn 4.5 g in 100 mL NS.     I discussed the patient's case and the above findings with Dr. Mcmullen.  Labs still pending but this would not change disposition.  Perhaps if lactic acid is very high at that she would need to go to IMCU.  Hospitalist will wait for lactic acid results prior to placing orders.    HYDRATION: Based on the patient's presentation of Inability to take oral fluids, Sepsis and Tachycardia the patient was given IV fluids. IV Hydration was used because oral hydration was not allowed due to patient's NPO status. Upon recheck following hydration, the heart rate decreased to 82.      DISPOSITION:  Patient will be hospitalized by hospitalist service in guarded condition.    FINAL IMPRESSION  1. Abscess of multiple sites    2. Tachycardia           I, Lyubov Pacheco), " am scribing for, and in the presence of, MARVA Lee II.    Electronically signed by: Lyubov Kothari (Scribe), 6/8/2022    IBart II, M* personally performed the services described in this documentation, as scribed by Lyubov Kothari in my presence, and it is both accurate and complete.    The note accurately reflects work and decisions made by me.  Bart Medina II, M.D.  6/8/2022  7:25 AM

## 2022-06-08 NOTE — ED TRIAGE NOTES
Luna Cleojovan River  32 y.o. female  Chief Complaint   Patient presents with   • Abscess     Pt has several abscesses at various sites and has lanced a few herself. The ones she has lanced herself have pus oozing from them per pt.      Pt states that she's trying to quit, says she last used drugs over 24hrs ago.     Vitals:    06/08/22 0158   BP: (!) 130/92   Pulse: (!) 119   Resp: 15   Temp: 36.6 °C (97.8 °F)   SpO2: 93%       Triage process explained to patient, apologized for wait time, and returned to lobby.  Pt informed to notify staff of any change in condition.

## 2022-06-08 NOTE — H&P
Hospital Medicine History & Physical Note    Date of Service  6/8/2022    Primary Care Physician  Pcp Pt States None    Code Status  Full Code    Chief Complaint  Chief Complaint   Patient presents with   • Abscess     Pt has several abscesses at various sites and has lanced a few herself. The ones she has lanced herself have pus oozing from them per pt.        History of Presenting Illness  Luna River is a 32 y.o. female with h/o IVDA (opiates, last use 2 days ago), hepatitis C, h/o multiple admissions for abscesses presented 6/8/2022 with b/l leg abscesses. Patient report pain 8/10, redness, swelling on on multiple spots on b/l legs. Says she first noticed it about 2 weeks ago and this quickly started popping up after that, she tried to pop on her own but it came back. Says she has chills, denies fevers or any other acute complaints.  Discussed with ortho Dr. Cortes. Plan I&D today.  On admission, patient was hypotensive, responded to IVF.    In the ED afebrile, tachycardic, hemodynamically stable.    I discussed the plan of care with patient, family, bedside RN and edp.    Review of Systems  Review of Systems   Constitutional: Negative for chills and fever.   HENT: Negative for sore throat.    Eyes: Negative.    Respiratory: Negative for cough and shortness of breath.    Cardiovascular: Negative for chest pain.   Gastrointestinal: Negative for abdominal pain, diarrhea, nausea and vomiting.   Genitourinary: Negative for dysuria and urgency.   Musculoskeletal: Negative for myalgias.   Skin: Positive for rash.        Abscesses in lower extremities   Neurological: Negative for dizziness and headaches.   Psychiatric/Behavioral: Positive for substance abuse.   All other systems reviewed and are negative.      Past Medical History   has a past medical history of Anorexia, Bipolar 2 disorder (HCC), bulemia, Hepatitis C, Hepatitis C, Impetigo, Liver disease, Major depressive disorder, Mood disorder (HCC), MRSA  infection, Oppositional defiant disorder, Polysubstance abuse (HCC), Post-traumatic stress disorder, Schizoaffective disorder, and Scoliosis.    Surgical History   has a past surgical history that includes tonsillectomy; pr removal of tonsils,<13 y/o; other; anthony by laparoscopy (2/1/2013); and irrigation & debridement general (Right, 2/23/2022).     Family History  family history is not on file.   Family history reviewed with patient. There is no family history that is pertinent to the chief complaint.     Social History   reports that she has been smoking cigarettes. She has a 1.75 pack-year smoking history. She has never used smokeless tobacco. She reports previous alcohol use. She reports current drug use. Drugs: Intravenous and Injected (Skin Popping).    Allergies  Allergies   Allergen Reactions   • Clonazepam Unspecified     Pt states that she overdosed on it before. Furthermore, per historical, pt had seizure-like activity while taking Clonazepam as a child.   • Gabapentin Unspecified     Causes seizures.   • Gabadone Unspecified     Unknown reaction.   • Tiagabine Hydrochloride Unspecified     Unknown reaction.       Medications  None       Physical Exam  Temp:  [36.6 °C (97.8 °F)-36.7 °C (98 °F)] 36.7 °C (98 °F)  Pulse:  [] 56  Resp:  [15-18] 18  BP: ()/(45-92) 91/50  SpO2:  [93 %-100 %] 100 %  Blood Pressure: (!) 130/92   Temperature: 36.6 °C (97.8 °F)   Pulse: (!) 119   Respiration: 15   Pulse Oximetry: 93 %       Physical Exam  Constitutional:       Comments: Disheveled   HENT:      Head: Normocephalic and atraumatic.      Mouth/Throat:      Mouth: Mucous membranes are moist.      Pharynx: No oropharyngeal exudate or posterior oropharyngeal erythema.   Eyes:      General: No scleral icterus.  Cardiovascular:      Rate and Rhythm: Regular rhythm. Tachycardia present.      Pulses: Normal pulses.      Heart sounds: Normal heart sounds. No murmur heard.  Pulmonary:      Effort: Pulmonary effort  is normal. No respiratory distress.      Breath sounds: Normal breath sounds.   Abdominal:      General: There is no distension.      Palpations: Abdomen is soft.      Tenderness: There is no abdominal tenderness.   Musculoskeletal:         General: Normal range of motion.      Cervical back: Normal range of motion and neck supple.      Comments: Multiple abscesses in the lower extremities bilaterally.  Only 1 on the right upper thigh with serosanguineous discharge, no discharge seen around the wrist.  Left ankle edematous but nontender to palpation, full range of motion   Skin:     General: Skin is warm and dry.      Comments: Multiple old scars on the upper extremities bilaterally.  Multiple abscesses on b/l legs. One on right upper thigh is draining pus.   Neurological:      General: No focal deficit present.      Mental Status: She is alert and oriented to person, place, and time.   Psychiatric:         Mood and Affect: Mood normal.         Laboratory:  Recent Labs     06/08/22  0533   WBC 10.0   RBC 3.81*   HEMOGLOBIN 10.5*   HEMATOCRIT 32.5*   MCV 85.3   MCH 27.6   MCHC 32.3*   RDW 43.4   PLATELETCT 357   MPV 9.1     Recent Labs     06/08/22  0533   SODIUM 141   POTASSIUM 4.3   CHLORIDE 105   CO2 25   GLUCOSE 85   BUN 11   CREATININE 0.78   CALCIUM 8.7     Recent Labs     06/08/22  0533   ALTSGPT 25   ASTSGOT 36   ALKPHOSPHAT 135*   TBILIRUBIN 0.2   GLUCOSE 85     Recent Labs     06/08/22  0533   APTT 33.0   INR 1.06     No results for input(s): NTPROBNP in the last 72 hours.      No results for input(s): TROPONINT in the last 72 hours.    Imaging:  DX-CHEST-PORTABLE (1 VIEW)   Final Result      No acute cardiopulmonary abnormality.         EC-ECHOCARDIOGRAM COMPLETE W/ CONT    (Results Pending)       Assessment/Plan:  Justification for Admission Status  I anticipate this patient will require at least two midnights for appropriate medical management, necessitating inpatient admission because Injection drug  user with many abscesses in the lower extremities.  Has had bacteremia in the past secondary to this, therefore she will need to be treated with IV antibiotics and will need to follow cultures prior to discharge as she is at high risk for not following up or complying with treatment.  This require at least 2-day stay    * Leg abscess- (present on admission)  Assessment & Plan  B/l leg multiple abscesses  One on right upper thigh is draining pus  Discussed with ortho Dr. Cortes. Plan I&D today  Blood cultures x2  Cefepime+vanco  Echo     Hypotension  Assessment & Plan  Likely due to sepsis  IV fluids  Monitor  Source control, ortho planning I&D today    Tobacco abuse- (present on admission)  Assessment & Plan  Smokes 5 cigaretes per day  Nicotine replacement while in hospital  Smoking cessation counseling    Sepsis (HCC)  Assessment & Plan  This is Sepsis Present on admission  SIRS criteria identified on my evaluation include: Tachycardia, with heart rate greater than 90 BPM  Source is skin and soft tissue  Sepsis protocol initiated  Fluid resuscitation ordered per protocol  Crystalloid Fluid Administration: Fluid resuscitation ordered per standard protocol - 30 mL/kg per current or ideal body weight  IV antibiotics as appropriate for source of sepsis  Reassessment: I have reassessed the patient's hemodynamic status  Blood cultures  Broad spectrum antibiotics  Source control, ortho planning I&D today    IV drug abuse (HCC)- (present on admission)  Assessment & Plan  Check urine toxicology  Cessation counseling     Hepatitis C antibody test positive- (present on admission)  Assessment & Plan  H/o hep C  Not treated  GI follow up as outpatient      VTE prophylaxis: pharmacologic prophylaxis contraindicated due to Possible procedures

## 2022-06-08 NOTE — ANESTHESIA PREPROCEDURE EVALUATION
Case: [id number] Date/Time: 06/08/22 1135    Procedure: IRRIGATION AND DEBRIDEMENT, WOUND ALL 4 EXTREMITIES    Location: [facility] OR 16 / SURGERY [facility]    Surgeons: Dakota Cortes M.D.          Relevant Problems   No relevant active problems       Physical Exam    Airway   Mallampati: II  TM distance: >3 FB  Neck ROM: full       Cardiovascular - normal exam  Rhythm: regular  Rate: normal  (-) murmur     Dental - normal exam           Pulmonary - normal exam  Breath sounds clear to auscultation     Abdominal    Neurological - normal exam                 Anesthesia Plan    ASA 3   ASA physical status 3 criteria: alcohol and/or substance dependence or abuse    Plan - general       Airway plan will be LMA          Induction: intravenous    Postoperative Plan: Postoperative administration of opioids is intended.    Pertinent diagnostic labs and testing reviewed    Informed Consent:    Anesthetic plan and risks discussed with patient.

## 2022-06-08 NOTE — CONSULTS
Date of Service: 06/08/22    Luna River was seen today in consultation for bilateral lower extremity abscesses at the request of     CC: Bilateral lower extremity abscesses    HPI: Luna River is a 32 y.o. female who presents with complaints of pain to bilateral lower extremities and left hand.  This started over the last week after injection drug use.  She had other anterior hip abscess that she drained herself at home and is continued to drain.   The pain is 4/10 and is described as sharp.  The pain is made worse by palpation of the area and made better by rest and immobilization.    PMH:   Past Medical History:   Diagnosis Date   • Anorexia    • Bipolar 2 disorder (HCC)    • bulemia    • Hepatitis C    • Hepatitis C    • Impetigo    • Liver disease    • Major depressive disorder    • Mood disorder (HCC)    • MRSA infection    • Oppositional defiant disorder    • Polysubstance abuse (HCC)    • Post-traumatic stress disorder    • Schizoaffective disorder    • Scoliosis        PSH:   Past Surgical History:   Procedure Laterality Date   • IRRIGATION & DEBRIDEMENT GENERAL Right 2/23/2022    Procedure: IRRIGATION AND DEBRIDEMENT, WOUND- RIGHT THIGH AND BUTTOCK;  Surgeon: Dakota Cortes M.D.;  Location: [facility];  Service: Orthopedics   • YONATAN BY LAPAROSCOPY  2/1/2013    Performed by Yves Rooney M.D. at [facility] ORS   • OTHER      dental   • [place] REMOVAL OF TONSILS,<13 Y/O     • TONSILLECTOMY         FH: History reviewed. No pertinent family history.    SH:   Social History     Socioeconomic History   • Marital status: Single     Spouse name: Not on file   • Number of children: Not on file   • Years of education: Not on file   • Highest education level: Not on file   Occupational History   • Not on file   Tobacco Use   • Smoking status: Current Every Day Smoker     Packs/day: 0.25     Years: 7.00     Pack years: 1.75     Types: Cigarettes   • Smokeless tobacco:  "Never Used   Vaping Use   • Vaping Use: Never used   Substance and Sexual Activity   • Alcohol use: Not Currently     Comment: rarely   • Drug use: Yes     Types: Intravenous, Injected (Skin Popping)     Comment: Heroin, fentanyl, meth   • Sexual activity: Not on file   Other Topics Concern   • Not on file   Social History Narrative   • Not on file     Social Determinants of Health     Financial Resource Strain: Not on file   Food Insecurity: Not on file   Transportation Needs: Not on file   Physical Activity: Not on file   Stress: Not on file   Social Connections: Not on file   Intimate Partner Violence: Not on file   Housing Stability: Not on file       ROS: In review of the following systems: Constitutional, Eyes, ENT, Cardiovascular,Respiratory, GI, , Musculoskeletal, Skin, Neuro, Psych, Hematologic, Endocrine, Allergic; no pertinent findings were found related to the chief complaint and orthopedic injury     /62   Pulse 60   Temp 36.4 °C (97.6 °F) (Temporal)   Resp 16   Ht 1.753 m (5' 9\")   Wt 57.9 kg (127 lb 10.3 oz)   SpO2 99%     Physical Exam:  General: Thin and disheveled, appears older than stated age   HEENT: Normocephalic, atraumatic  Psych: Normal mood and affect  Neck: Supple, no pain to motion  Chest/Pulmonary: breathing unlabored, no audible wheezing  Cardio: Regular heart rate and rhythm  Neuro: Sensation grossly intact to BUE and BLE, moving all four extremities  Skin: Open wound to the right anterior hip with drainage.  MSK: Multiple abscesses to the lower extremities including the left calf left posterior knee left thigh and right calf and right thigh.  There is some swelling over the index finger of the left hand although she has no pain to passive range of motion or active motion.  There is no fluctuance at this location.    Imaging and labs:     Recent Labs     06/08/22  0533   WBC 10.0   RBC 3.81*   HEMOGLOBIN 10.5*   HEMATOCRIT 32.5*   MCV 85.3   MCH 27.6   RDW 43.4 "   PLATELETCT 357   MPV 9.1   NEUTSPOLYS 62.80   LYMPHOCYTES 30.30   MONOCYTES 5.70   EOSINOPHILS 0.10   BASOPHILS 0.20       Assessment:   1. Abscess of multiple sites     2. Tachycardia         I discussed the diagnosis and findings with the patient at length.  I reviewed possible non operative and operative interventions and the risks and benefits of each of these.  I recommended debridement of the more tense abscesses to prevent any further bacteremia or other complications.  There is a risk of wound healing complications or need for additional procedures she had a chance to ask questions and all of these were answered to her satisfaction.        Plan:  N.p.o.  Incision and drainage of multiple abscesses bilateral lower extremities  Wound care and secondary healing of drained abscesses  Antibiotics

## 2022-06-08 NOTE — OR NURSING
Pt ate 100% boxed lunch. Stood up and transferred from [facility] to hospital bed. Steady when up.

## 2022-06-08 NOTE — ASSESSMENT & PLAN NOTE
This is Sepsis Present on admission  SIRS criteria identified on my evaluation include: Tachycardia, with heart rate greater than 90 BPM  Source is skin and soft tissue  Sepsis protocol initiated  Fluid resuscitation ordered per protocol  Crystalloid Fluid Administration: Fluid resuscitation ordered per standard protocol - 30 mL/kg per current or ideal body weight  IV antibiotics as appropriate for source of sepsis  Reassessment: I have reassessed the patient's hemodynamic status  Blood cultures  Broad spectrum antibiotics  Source control, ortho planning I&D today

## 2022-06-08 NOTE — PROGRESS NOTES
"Pharmacy Vancomycin Kinetics Note for 6/8/2022     32 y.o. female on Vancomycin day # 1     Vancomycin Indication (AUC Dosing): Skin/skin structure infection  Vancomycin Indication (Two level/Trough based Dosing):      Provider specified end date: 06/15/22    Active Antibiotics (From admission, onward)    Ordered     Ordering Provider       Wed Jun 8, 2022  8:50 AM    06/08/22 0850  vancomycin (VANCOCIN) 750 mg in  mL IVPB  (vancomycin (VANCOCIN) IV (LD + Maintenance))  EVERY 12 HOURS        Note to Pharmacy: Per P&T kinetics protocol    Regina Ly M.D.       Wed Jun 8, 2022  8:03 AM    06/08/22 0803  cefepime (Maxipime) 2 g in dextrose 5% 20 mL IV syringe  EVERY 12 HOURS        Note to Pharmacy: Per P&T Antimicrobial Dose Adjustment Protocol    Regina Ly M.D.    06/08/22 0803  MD Alert...Vancomycin per Pharmacy  PHARMACY TO DOSE        Question:  Indication(s) for vancomycin?  Answer:  Skin and soft tissue infection    Regina Ly M.D.       Wed Jun 8, 2022  5:41 AM    06/08/22 0541  vancomycin (VANCOCIN) 1,500 mg in  mL IVPB  (vancomycin (VANCOCIN) IV (LD + Maintenance))  ONCE         Bart Medina II, M.D.          Dosing Weight: 57.9 kg (127 lb 10.3 oz)      Admission History: Admitted on 6/8/2022 for Leg abscess [L02.419]  Pertinent history: Patient is admitted with multiple bilateral LE abscesses secondary to skin popping and IVDA.  She has a history of previous abscesses (+) Haemophilus, GAS and V. Strep. Will need surgical debridement.    PMH: Hepatitis C    Allergies:     Clonazepam, Gabapentin, Gabadone, and Tiagabine hydrochloride     Pertinent cultures to date:     Results     Procedure Component Value Units Date/Time    Blood Culture [[id number]] Collected: 06/08/22 0534    Order Status: Sent Specimen: Blood from Peripheral Updated: 06/08/22 0655    Narrative:      1 of 2 for Blood Culture x 2 sites order. Per Hospital  Policy: Only change Specimen Src: to \"Line\" " "if specified by  physician order.    Blood Culture [[id number]] Collected: 22 0533    Order Status: Sent Specimen: Blood from Peripheral Updated: 22    Narrative:      2 of 2 blood culture x2  Sites order. Per Hospital Policy:  Only change Specimen Src: to \"Line\" if specified by physician  order.    Urinalysis [[id number]]     Order Status: Sent Specimen: Urine, Clean Catch     Urine Culture (NEW) [[id number]]     Order Status: Sent Specimen: Urine, Clean Catch     Blood Culture [[id number]] Collected: 22    Order Status: Canceled Specimen: Other from Peripheral     BLOOD CULTURE [[id number]] Collected: 22    Order Status: Canceled Specimen: Other from Peripheral     BLOOD CULTURE [[id number]] Collected: 22    Order Status: Canceled Specimen: Other from Peripheral           Labs:     Estimated Creatinine Clearance: 96.4 mL/min (by C-G formula based on SCr of 0.78 mg/dL).  Recent Labs     22   WBC 10.0   NEUTSPOLYS 62.80     Recent Labs     22   BUN 11   CREATININE 0.78   ALBUMIN 3.1*     No intake or output data in the 24 hours ending 22   /88   Pulse 71   Temp 36.7 °C (98 °F) (Temporal)   Resp 18   Ht 1.753 m (5' 9\")   Wt 57.9 kg (127 lb 10.3 oz)   SpO2 94%  Temp (24hrs), Av.6 °C (97.9 °F), Min:36.6 °C (97.8 °F), Max:36.7 °C (98 °F)      List concerns for Vancomycin clearance:      None    Pharmacokinetics:     AUC kinetics:   Ke (hr ^-1): 0.0844 hr^-1  Half life: 8.21 hr  Clearance: 3.176  Estimated TDD: 1588  Estimated Dose: 675  Estimated interval: 10.2    A/P:     -  Vancomycin dose: Received Vancomycin loading dose this AM at 0600. Will start maintenance dose of 750 mg IV 12 hours at 1800 tonight.    -  Next vancomycin level(s):    -None ordered at this time. To be ordered by rounding clinical pharmacist once the patient is at steady state (around dose 5).       -  Predicted vancomycin AUC from initial AUC test " calculator: 472 mg·hr/L    -  Comments: Plan for surgical debridement in the OR. F/u on cultures. Concern for compliance with outpatient regimen due to history of non-compliance. Will order nares MRSA swab.     Cecille Hernández, BaltazarD

## 2022-06-08 NOTE — ASSESSMENT & PLAN NOTE
B/l leg multiple abscesses  One on right upper thigh is draining pus  Discussed with ortho Dr. Cortes. Plan I&D today  Blood cultures x2  Cefepime+vanco  Echo

## 2022-06-09 LAB
GRAM STN SPEC: NORMAL
GRAM STN SPEC: NORMAL
SIGNIFICANT IND 70042: NORMAL
SIGNIFICANT IND 70042: NORMAL
SITE SITE: NORMAL
SITE SITE: NORMAL
SOURCE SOURCE: NORMAL
SOURCE SOURCE: NORMAL

## 2022-06-09 PROCEDURE — 99239 HOSP IP/OBS DSCHRG MGMT >30: CPT | Performed by: STUDENT IN AN ORGANIZED HEALTH CARE EDUCATION/TRAINING PROGRAM

## 2022-06-09 NOTE — PROGRESS NOTES
RN states that patient is requesting IV Dilauded and is refusing Tramadol and is threatening to leave AMA. The patient has a relevant history of opioid use disorder. RN notified that pain regimen would not be modified as there is no indication for IV pain medication at this time.

## 2022-06-09 NOTE — DISCHARGE SUMMARY
Discharge Summary/ AMA    CHIEF COMPLAINT ON ADMISSION  Chief Complaint   Patient presents with   • Abscess     Pt has several abscesses at various sites and has lanced a few herself. The ones she has lanced herself have pus oozing from them per pt.        Reason for Admission  Wound Check     Admission Date  6/8/2022    CODE STATUS  Prior    HPI & HOSPITAL COURSE  [place] is a 32 y.o. female with h/o IVDA (opiates, last use 2 days ago), hepatitis C, h/o multiple admissions for abscesses presented 6/8/2022 with b/l leg abscesses. Patient report pain 8/10, redness, swelling on on multiple spots on b/l legs. Says she first noticed it about 2 weeks ago and this quickly started popping up after that, she tried to pop on her own but it came back. Says she has chills, denies fevers or any other acute complaints.  Discussed with ortho Dr. Cortes. Plan I&D today.  On admission, patient was hypotensive, responded to IVF.     In the ED afebrile, tachycardic, hemodynamically stable.     Patient underwent I&D on 6/8/2022. In post-operative period, was requesting IV Dilauded and is refusing Tramadol and is threatening to leave AMA.    Unfortunately, she decided to leave AMA. She was oriented and understood the potential risks of leaving before medically cleared. SHe was educated and counseled on the reasons we recommend he stay but she was set on discharging.     Discharge Date  6/8/2022    FOLLOW UP ITEMS POST DISCHARGE  Follow up with PCP    DISCHARGE DIAGNOSES  Principal Problem:    Leg abscess POA: Yes  Active Problems:    Hepatitis C antibody test positive POA: Yes    IV drug abuse (HCC) POA: Yes    Cellulitis POA: Yes    Cutaneous abscess POA: Yes    Sepsis (HCC) POA: Unknown    Tobacco abuse POA: Yes    Hypotension POA: Unknown  Resolved Problems:    * No resolved hospital problems. *      FOLLOW UP  No future appointments.  No follow-up provider specified.    MEDICATIONS ON DISCHARGE     Medication List       You have not been prescribed any medications.         Allergies  Allergies   Allergen Reactions   • Clonazepam Unspecified     Pt states that she overdosed on it before. Furthermore, per historical, pt had seizure-like activity while taking Clonazepam as a child.   • Gabapentin Unspecified     Causes seizures.   • Gabadone Unspecified     Unknown reaction.   • Tiagabine Hydrochloride Unspecified     Unknown reaction.       DIET  No orders of the defined types were placed in this encounter.      ACTIVITY  As tolerated.  Weight bearing as tolerated    CONSULTATIONS  Orthopedic surgery    PROCEDURES  6/8/2022: I&D    LABORATORY  Lab Results   Component Value Date    SODIUM 141 06/08/2022    POTASSIUM 4.3 06/08/2022    CHLORIDE 105 06/08/2022    CO2 25 06/08/2022    GLUCOSE 85 06/08/2022    BUN 11 06/08/2022    CREATININE 0.78 06/08/2022    CREATININE 0.6 05/29/2008        Lab Results   Component Value Date    WBC 10.0 06/08/2022    HEMOGLOBIN 10.5 (L) 06/08/2022    HEMATOCRIT 32.5 (L) 06/08/2022    PLATELETCT 357 06/08/2022        Total time of the discharge process exceeds 31 minutes.

## 2022-06-09 NOTE — PROGRESS NOTES
Received report from previous shift RN  Assessment complete.  A&O x 4. Patient calls appropriately.  Patient is upself. Bed alarm off.   Patient has 8/10 pain. Pain medication offered per MAR, medication refused.  Denies N&V. Tolerating reg diet.  Pt has bilat leg wounds, DIP, CDI.  + void, - flatus, - BM.  Patient denies SOB.  SCD's off.    Review plan of care with patient. Call light and personal belongings with in reach. Hourly rounding in place. All needs met at this time.

## 2022-06-09 NOTE — PROGRESS NOTES
"Patient  admitted to room T 423 via transport in [facility] from PACU at 1700.  Pt /62   Pulse 72   Temp 37 °C (98.6 °F) (Temporal)   Resp 17   Ht 1.753 m (5' 9\")   Wt 57.9 kg (127 lb 10.3 oz)   LMP  (LMP Unknown)   SpO2 96%   BMI 18.85 kg/m²     Patient reports pain at 0 on a scale of 0-10. Educated patient regarding pharmacologic and non pharmacologic modalities for pain management. Oriented to room call light and smoking policy.  Reviewed plan of care (equipment, incentive spirometer, sequential compression devices, medications, activity, diet, fall precautions, skin care, and pain) with patient and family. Welcome packet given and reviewed with patient, all questions answered. Education provided on oral hygiene program.    AA&Ox4. Denies CP/SOB.  See 2 RN skin note  NPO w sips of meds diet. Denies N/V.  + void. + BM. Last BM PTA  Pt ambulates independently.  All needs met at this time. Call light within reach. Pt calls appropriately. Bed low and locked, non skid socks in place. Hourly rounding in place.      "

## 2022-06-09 NOTE — PROGRESS NOTES
Pt was asking for pain medication, Tramadol was offered to the pt but pt refused the medication and stated wanting to leave AMA if the pt was not given dilaudid. Charge RN notified of situation. A call was made to the on call Hospitalist.

## 2022-06-09 NOTE — PROGRESS NOTES
4 Eyes Skin Assessment Completed by GERARD Leger and GERARD Smith.    Head WDL  Ears WDL  Nose WDL  Mouth WDL  Neck WDL  Breast/Chest WDL  Shoulder Blades WDL  Spine WDL  (R) Arm/Elbow/Hand WDL  (L) Arm/Elbow/Hand WDL  Abdomen WDL  Groin WDL  Scrotum/Coccyx/Buttocks WDL  (R) Leg x2 Upper Thigh InD Dressing in place CDI  Multiple Scattered Abrasions   (L) Leg  x2 Leg Ind Dressings in place CDI Multiple Scattered Abrasions  (R) Heel/Foot/Toe WDL  (L) Heel/Foot/Toe  WDL          Devices In Places Tele Box, Blood Pressure Cuff, SCD's and Nasal Cannula      Interventions In Place Pillows, Heels Loaded W/Pillows and Pressure Redistribution Mattress    Possible Skin Injury No    Pictures Uploaded Into Epic N/A  Wound Consult Placed N/A  RN Wound Prevention Protocol Ordered No

## 2022-06-09 NOTE — PROGRESS NOTES
Pt was notified that pain regimen would not be modified per physician's orders. Jose De Jesus RN attempted to convince pt to stay, Brenda Mckinnon RN attempted to convince pt to stay. Pt made the decision to leave AMA.    Physician notified of pt's decision to leave AMA. No new orders received.

## 2022-06-09 NOTE — CARE PLAN
Problem: Pain - Standard  Goal: Alleviation of pain or a reduction in pain to the patient’s comfort goal  Outcome: Progressing   Pain medicated per MAR   Problem: Knowledge Deficit - Standard  Goal: Patient and family/care givers will demonstrate understanding of plan of care, disease process/condition, diagnostic tests and medications  Outcome: Progressing   Patient educated on plan of care this shift  Problem: Hemodynamics  Goal: Patient's hemodynamics, fluid balance and neurologic status will be stable or improve  Outcome: Progressing  Patient AnOx4/ Dayanara's per flowsheets   The patient is Stable - Low risk of patient condition declining or worsening    Shift Goals  Clinical Goals: Advancement of Diet  Patient Goals: Advancement of Diet  Family Goals:     Progress made toward(s) clinical / shift goals:  Diet advanced to regular     Patient is not progressing towards the following goals:

## 2022-06-11 LAB
BACTERIA WND AEROBE CULT: ABNORMAL
GRAM STN SPEC: ABNORMAL
GRAM STN SPEC: ABNORMAL
SIGNIFICANT IND 70042: ABNORMAL
SIGNIFICANT IND 70042: ABNORMAL
SITE SITE: ABNORMAL
SITE SITE: ABNORMAL
SOURCE SOURCE: ABNORMAL
SOURCE SOURCE: ABNORMAL

## 2022-06-12 LAB
BACTERIA SPEC ANAEROBE CULT: NORMAL
BACTERIA SPEC ANAEROBE CULT: NORMAL
SIGNIFICANT IND 70042: NORMAL
SIGNIFICANT IND 70042: NORMAL
SITE SITE: NORMAL
SITE SITE: NORMAL
SOURCE SOURCE: NORMAL
SOURCE SOURCE: NORMAL

## 2022-06-20 NOTE — OP REPORT
DATE OF OPERATION: 6/08/2022     PREOPERATIVE DIAGNOSIS: Bilateral thigh abscesses, right leg abscess    POSTOPERATIVE DIAGNOSIS: Same    PROCEDURE PERFORMED: Incision and drainage of bilateral thigh abscesses, incision and drainage of right leg abscess    SURGEON: Dakota Cortes M.D.     ASSISTANT: None    ANESTHESIA: General    SPECIMEN: Cultures    ESTIMATED BLOOD LOSS: 10 mL    IMPLANTS: None      INDICATIONS: The patient is a 32 y.o. female who presented with bilateral thigh abscesses and right leg abscess from injection drug use.  I recommended drainage of these abscesses both for cultures and to decompress and allow for further healing.  I discussed the risks and benefits of the procedure which include but are not limited to risks of infection, wound healing complication, neurovascular injury, pain, possible need for additional procedures such as repeat debridement, and the medical risks of anesthesia including MI, stroke, and death.  Alternatives to surgery were also discussed, including non-operative management, which I did not recommend.  The patient was in agreement with the plan to proceed, and the informed consent was signed and documented.  I met with the patient pre-operatively and marked the operative extremity with their agreement.  We proceeded to the operating room.     DESCRIPTION OF PROCEDURE:  Patient was seen in the preoperative holding area on the day of surgery. The operative site was marked with my initials.  she was taken to the operating room and placed supine on the operative table.  Anesthesia was induced.  The operative extremity was prepped and draped in the normal sterile fashion.  Operative pause was conducted and the correct patient, site, side, procedure, and surgeon's initials on extremity were identified.  Attention was first turned to the thigh abscesses.  The left thigh abscess was fluctuant to palpation and the surrounding tissues were indurated.  This was sharply incised  with a 15 blade knife.  Used a hemostat to dissect down into the abscess cavity which allow this area to fully decompress and drain outwardly.  A sample of this was sent for culture.  There was gross purulence within the abscess cavity.  This area was then thoroughly irrigated with normal saline.  Attention was then turned to the right thigh and leg.  There was a more proximal abscess over the anterior aspect of the right hip.  This had partially self decompressed and there is open wound present.  Used a knife to extend this and then a hemostat to break up loculations and further express the areas of fluctuance and purulence.  This area was then again thoroughly irrigated.  The right calf also showed an abscess to the posterior lateral calf.  Again a stab incision was made directly over this abscess and bluntly dissected down with a hemostat.  Rongeur was used to remove any other nonviable tissue.  This area was then thoroughly irrigated normal saline.  All of the wounds were left open to heal by secondary intention.  Wet-to-dry dressings were placed at each site.  Once all dressings were in place the patient awoken the operating room was taken to PACU in stable condition.    POSTOPERATIVE PLAN: As tolerated weight bearing.  Mobilize with physical and occupational therapies.  Antibiotics DVT prophylaxis with SCDs and Lovenox until mobilizing independently and then can be switched to aspirin for 4 weeks.  The patient will follow up in clinic in 2 weeks to check wounds.    ____________________________________   Dakota Cortes M.D.

## 2023-07-23 ENCOUNTER — APPOINTMENT (OUTPATIENT)
Dept: RADIOLOGY | Facility: MEDICAL CENTER | Age: 33
DRG: 141 | End: 2023-07-23
Attending: EMERGENCY MEDICINE
Payer: COMMERCIAL

## 2023-07-23 ENCOUNTER — HOSPITAL ENCOUNTER (INPATIENT)
Facility: MEDICAL CENTER | Age: 33
LOS: 1 days | DRG: 141 | End: 2023-07-25
Attending: EMERGENCY MEDICINE | Admitting: INTERNAL MEDICINE
Payer: COMMERCIAL

## 2023-07-23 DIAGNOSIS — K04.7 DENTAL INFECTION: ICD-10-CM

## 2023-07-23 DIAGNOSIS — Z59.00 HOMELESSNESS: ICD-10-CM

## 2023-07-23 DIAGNOSIS — K04.7 DENTAL ABSCESS: ICD-10-CM

## 2023-07-23 DIAGNOSIS — L03.211 FACIAL CELLULITIS: ICD-10-CM

## 2023-07-23 LAB
ALBUMIN SERPL BCP-MCNC: 4.3 G/DL (ref 3.2–4.9)
ALBUMIN/GLOB SERPL: 1.3 G/DL
ALP SERPL-CCNC: 107 U/L (ref 30–99)
ALT SERPL-CCNC: 16 U/L (ref 2–50)
ANION GAP SERPL CALC-SCNC: 11 MMOL/L (ref 7–16)
AST SERPL-CCNC: 21 U/L (ref 12–45)
BASOPHILS # BLD AUTO: 0.1 % (ref 0–1.8)
BASOPHILS # BLD: 0.01 K/UL (ref 0–0.12)
BILIRUB SERPL-MCNC: 0.4 MG/DL (ref 0.1–1.5)
BUN SERPL-MCNC: 18 MG/DL (ref 8–22)
CALCIUM ALBUM COR SERPL-MCNC: 9.2 MG/DL (ref 8.5–10.5)
CALCIUM SERPL-MCNC: 9.4 MG/DL (ref 8.5–10.5)
CHLORIDE SERPL-SCNC: 103 MMOL/L (ref 96–112)
CO2 SERPL-SCNC: 26 MMOL/L (ref 20–33)
CREAT SERPL-MCNC: 0.66 MG/DL (ref 0.5–1.4)
EOSINOPHIL # BLD AUTO: 0.01 K/UL (ref 0–0.51)
EOSINOPHIL NFR BLD: 0.1 % (ref 0–6.9)
ERYTHROCYTE [DISTWIDTH] IN BLOOD BY AUTOMATED COUNT: 41.7 FL (ref 35.9–50)
GFR SERPLBLD CREATININE-BSD FMLA CKD-EPI: 120 ML/MIN/1.73 M 2
GLOBULIN SER CALC-MCNC: 3.2 G/DL (ref 1.9–3.5)
GLUCOSE SERPL-MCNC: 91 MG/DL (ref 65–99)
HCT VFR BLD AUTO: 36.1 % (ref 37–47)
HGB BLD-MCNC: 11.9 G/DL (ref 12–16)
IMM GRANULOCYTES # BLD AUTO: 0.02 K/UL (ref 0–0.11)
IMM GRANULOCYTES NFR BLD AUTO: 0.3 % (ref 0–0.9)
LACTATE SERPL-SCNC: 0.8 MMOL/L (ref 0.5–2)
LYMPHOCYTES # BLD AUTO: 2.39 K/UL (ref 1–4.8)
LYMPHOCYTES NFR BLD: 32.1 % (ref 22–41)
MCH RBC QN AUTO: 28.7 PG (ref 27–33)
MCHC RBC AUTO-ENTMCNC: 33 G/DL (ref 32.2–35.5)
MCV RBC AUTO: 87 FL (ref 81.4–97.8)
MONOCYTES # BLD AUTO: 0.56 K/UL (ref 0–0.85)
MONOCYTES NFR BLD AUTO: 7.5 % (ref 0–13.4)
NEUTROPHILS # BLD AUTO: 4.46 K/UL (ref 1.82–7.42)
NEUTROPHILS NFR BLD: 59.9 % (ref 44–72)
NRBC # BLD AUTO: 0 K/UL
NRBC BLD-RTO: 0 /100 WBC (ref 0–0.2)
PLATELET # BLD AUTO: 236 K/UL (ref 164–446)
PMV BLD AUTO: 9.4 FL (ref 9–12.9)
POTASSIUM SERPL-SCNC: 3.2 MMOL/L (ref 3.6–5.5)
PROT SERPL-MCNC: 7.5 G/DL (ref 6–8.2)
RBC # BLD AUTO: 4.15 M/UL (ref 4.2–5.4)
SODIUM SERPL-SCNC: 140 MMOL/L (ref 135–145)
WBC # BLD AUTO: 7.5 K/UL (ref 4.8–10.8)

## 2023-07-23 PROCEDURE — 36415 COLL VENOUS BLD VENIPUNCTURE: CPT

## 2023-07-23 PROCEDURE — 70487 CT MAXILLOFACIAL W/DYE: CPT

## 2023-07-23 PROCEDURE — 85025 COMPLETE CBC W/AUTO DIFF WBC: CPT

## 2023-07-23 PROCEDURE — 700111 HCHG RX REV CODE 636 W/ 250 OVERRIDE (IP): Mod: JZ,UD | Performed by: EMERGENCY MEDICINE

## 2023-07-23 PROCEDURE — 700105 HCHG RX REV CODE 258: Mod: UD | Performed by: EMERGENCY MEDICINE

## 2023-07-23 PROCEDURE — 87040 BLOOD CULTURE FOR BACTERIA: CPT

## 2023-07-23 PROCEDURE — 83605 ASSAY OF LACTIC ACID: CPT

## 2023-07-23 PROCEDURE — 96365 THER/PROPH/DIAG IV INF INIT: CPT

## 2023-07-23 PROCEDURE — 70355 PANORAMIC X-RAY OF JAWS: CPT

## 2023-07-23 PROCEDURE — 80053 COMPREHEN METABOLIC PANEL: CPT

## 2023-07-23 PROCEDURE — 99285 EMERGENCY DEPT VISIT HI MDM: CPT

## 2023-07-23 PROCEDURE — 87077 CULTURE AEROBIC IDENTIFY: CPT | Mod: 91

## 2023-07-23 RX ADMIN — AMPICILLIN AND SULBACTAM 3 G: 1; 2 INJECTION, POWDER, FOR SOLUTION INTRAMUSCULAR; INTRAVENOUS at 23:15

## 2023-07-23 SDOH — ECONOMIC STABILITY - HOUSING INSECURITY: HOMELESSNESS UNSPECIFIED: Z59.00

## 2023-07-23 ASSESSMENT — FIBROSIS 4 INDEX: FIB4 SCORE: 0.63

## 2023-07-24 ENCOUNTER — APPOINTMENT (OUTPATIENT)
Dept: RADIOLOGY | Facility: MEDICAL CENTER | Age: 33
DRG: 141 | End: 2023-07-24
Attending: INTERNAL MEDICINE
Payer: COMMERCIAL

## 2023-07-24 ENCOUNTER — ANESTHESIA (OUTPATIENT)
Dept: SURGERY | Facility: MEDICAL CENTER | Age: 33
DRG: 141 | End: 2023-07-24
Payer: COMMERCIAL

## 2023-07-24 ENCOUNTER — ANESTHESIA EVENT (OUTPATIENT)
Dept: SURGERY | Facility: MEDICAL CENTER | Age: 33
DRG: 141 | End: 2023-07-24
Payer: COMMERCIAL

## 2023-07-24 PROBLEM — E87.6 HYPOKALEMIA: Status: ACTIVE | Noted: 2023-07-24

## 2023-07-24 PROBLEM — L03.211 FACIAL CELLULITIS: Status: ACTIVE | Noted: 2023-07-24

## 2023-07-24 PROBLEM — R78.81 BACTEREMIA: Status: ACTIVE | Noted: 2023-07-24

## 2023-07-24 PROCEDURE — 700111 HCHG RX REV CODE 636 W/ 250 OVERRIDE (IP): Mod: JZ | Performed by: ANESTHESIOLOGY

## 2023-07-24 PROCEDURE — 99406 BEHAV CHNG SMOKING 3-10 MIN: CPT | Performed by: INTERNAL MEDICINE

## 2023-07-24 PROCEDURE — 700111 HCHG RX REV CODE 636 W/ 250 OVERRIDE (IP): Mod: JZ,UD | Performed by: INTERNAL MEDICINE

## 2023-07-24 PROCEDURE — 770001 HCHG ROOM/CARE - MED/SURG/GYN PRIV*

## 2023-07-24 PROCEDURE — 700117 HCHG RX CONTRAST REV CODE 255: Performed by: EMERGENCY MEDICINE

## 2023-07-24 PROCEDURE — 160027 HCHG SURGERY MINUTES - 1ST 30 MINS LEVEL 2: Performed by: DENTIST

## 2023-07-24 PROCEDURE — 160038 HCHG SURGERY MINUTES - EA ADDL 1 MIN LEVEL 2: Performed by: DENTIST

## 2023-07-24 PROCEDURE — 700111 HCHG RX REV CODE 636 W/ 250 OVERRIDE (IP): Performed by: INTERNAL MEDICINE

## 2023-07-24 PROCEDURE — 160036 HCHG PACU - EA ADDL 30 MINS PHASE I: Performed by: DENTIST

## 2023-07-24 PROCEDURE — 700101 HCHG RX REV CODE 250: Performed by: ANESTHESIOLOGY

## 2023-07-24 PROCEDURE — 700102 HCHG RX REV CODE 250 W/ 637 OVERRIDE(OP): Performed by: INTERNAL MEDICINE

## 2023-07-24 PROCEDURE — 84703 CHORIONIC GONADOTROPIN ASSAY: CPT

## 2023-07-24 PROCEDURE — 0CDWXZ1 EXTRACTION OF UPPER TOOTH, MULTIPLE, EXTERNAL APPROACH: ICD-10-PCS | Performed by: DENTIST

## 2023-07-24 PROCEDURE — 700105 HCHG RX REV CODE 258: Mod: JZ | Performed by: INTERNAL MEDICINE

## 2023-07-24 PROCEDURE — 700101 HCHG RX REV CODE 250: Performed by: DENTIST

## 2023-07-24 PROCEDURE — A9270 NON-COVERED ITEM OR SERVICE: HCPCS | Performed by: ANESTHESIOLOGY

## 2023-07-24 PROCEDURE — 0CDXXZ1 EXTRACTION OF LOWER TOOTH, MULTIPLE, EXTERNAL APPROACH: ICD-10-PCS | Performed by: DENTIST

## 2023-07-24 PROCEDURE — 700105 HCHG RX REV CODE 258: Mod: JZ | Performed by: ANESTHESIOLOGY

## 2023-07-24 PROCEDURE — 96367 TX/PROPH/DG ADDL SEQ IV INF: CPT

## 2023-07-24 PROCEDURE — 160035 HCHG PACU - 1ST 60 MINS PHASE I: Performed by: DENTIST

## 2023-07-24 PROCEDURE — 700102 HCHG RX REV CODE 250 W/ 637 OVERRIDE(OP): Performed by: ANESTHESIOLOGY

## 2023-07-24 PROCEDURE — 96372 THER/PROPH/DIAG INJ SC/IM: CPT

## 2023-07-24 PROCEDURE — 160009 HCHG ANES TIME/MIN: Performed by: DENTIST

## 2023-07-24 PROCEDURE — 0NBV0ZZ EXCISION OF LEFT MANDIBLE, OPEN APPROACH: ICD-10-PCS | Performed by: DENTIST

## 2023-07-24 PROCEDURE — 160048 HCHG OR STATISTICAL LEVEL 1-5: Performed by: DENTIST

## 2023-07-24 PROCEDURE — 160002 HCHG RECOVERY MINUTES (STAT): Performed by: DENTIST

## 2023-07-24 PROCEDURE — 99223 1ST HOSP IP/OBS HIGH 75: CPT | Mod: 25,AI | Performed by: INTERNAL MEDICINE

## 2023-07-24 PROCEDURE — 0NBT0ZZ EXCISION OF RIGHT MANDIBLE, OPEN APPROACH: ICD-10-PCS | Performed by: DENTIST

## 2023-07-24 PROCEDURE — 00170 ANES INTRAORAL PX NOS: CPT | Performed by: ANESTHESIOLOGY

## 2023-07-24 PROCEDURE — 0NBR0ZZ EXCISION OF MAXILLA, OPEN APPROACH: ICD-10-PCS | Performed by: DENTIST

## 2023-07-24 PROCEDURE — A9270 NON-COVERED ITEM OR SERVICE: HCPCS | Performed by: INTERNAL MEDICINE

## 2023-07-24 RX ORDER — HALOPERIDOL 5 MG/ML
1 INJECTION INTRAMUSCULAR
Status: DISCONTINUED | OUTPATIENT
Start: 2023-07-24 | End: 2023-07-24 | Stop reason: HOSPADM

## 2023-07-24 RX ORDER — ONDANSETRON 2 MG/ML
4 INJECTION INTRAMUSCULAR; INTRAVENOUS EVERY 4 HOURS PRN
Status: DISCONTINUED | OUTPATIENT
Start: 2023-07-24 | End: 2023-07-25 | Stop reason: HOSPADM

## 2023-07-24 RX ORDER — POLYETHYLENE GLYCOL 3350 17 G/17G
1 POWDER, FOR SOLUTION ORAL
Status: DISCONTINUED | OUTPATIENT
Start: 2023-07-24 | End: 2023-07-25 | Stop reason: HOSPADM

## 2023-07-24 RX ORDER — HYDROMORPHONE HYDROCHLORIDE 1 MG/ML
0.1 INJECTION, SOLUTION INTRAMUSCULAR; INTRAVENOUS; SUBCUTANEOUS
Status: DISCONTINUED | OUTPATIENT
Start: 2023-07-24 | End: 2023-07-24 | Stop reason: HOSPADM

## 2023-07-24 RX ORDER — HYDROMORPHONE HYDROCHLORIDE 1 MG/ML
0.4 INJECTION, SOLUTION INTRAMUSCULAR; INTRAVENOUS; SUBCUTANEOUS
Status: DISCONTINUED | OUTPATIENT
Start: 2023-07-24 | End: 2023-07-24 | Stop reason: HOSPADM

## 2023-07-24 RX ORDER — ONDANSETRON 2 MG/ML
4 INJECTION INTRAMUSCULAR; INTRAVENOUS
Status: DISCONTINUED | OUTPATIENT
Start: 2023-07-24 | End: 2023-07-24 | Stop reason: HOSPADM

## 2023-07-24 RX ORDER — OXYCODONE HCL 5 MG/5 ML
10 SOLUTION, ORAL ORAL
Status: COMPLETED | OUTPATIENT
Start: 2023-07-24 | End: 2023-07-24

## 2023-07-24 RX ORDER — HYDROMORPHONE HYDROCHLORIDE 1 MG/ML
0.2 INJECTION, SOLUTION INTRAMUSCULAR; INTRAVENOUS; SUBCUTANEOUS
Status: DISCONTINUED | OUTPATIENT
Start: 2023-07-24 | End: 2023-07-24 | Stop reason: HOSPADM

## 2023-07-24 RX ORDER — AMOXICILLIN 250 MG
2 CAPSULE ORAL 2 TIMES DAILY
Status: DISCONTINUED | OUTPATIENT
Start: 2023-07-24 | End: 2023-07-25 | Stop reason: HOSPADM

## 2023-07-24 RX ORDER — MEPERIDINE HYDROCHLORIDE 25 MG/ML
12.5 INJECTION INTRAMUSCULAR; INTRAVENOUS; SUBCUTANEOUS
Status: DISCONTINUED | OUTPATIENT
Start: 2023-07-24 | End: 2023-07-24 | Stop reason: HOSPADM

## 2023-07-24 RX ORDER — MIDAZOLAM HYDROCHLORIDE 1 MG/ML
1 INJECTION INTRAMUSCULAR; INTRAVENOUS
Status: DISCONTINUED | OUTPATIENT
Start: 2023-07-24 | End: 2023-07-24 | Stop reason: HOSPADM

## 2023-07-24 RX ORDER — LIDOCAINE HYDROCHLORIDE 20 MG/ML
INJECTION, SOLUTION EPIDURAL; INFILTRATION; INTRACAUDAL; PERINEURAL PRN
Status: DISCONTINUED | OUTPATIENT
Start: 2023-07-24 | End: 2023-07-24 | Stop reason: SURG

## 2023-07-24 RX ORDER — DEXAMETHASONE SODIUM PHOSPHATE 4 MG/ML
INJECTION, SOLUTION INTRA-ARTICULAR; INTRALESIONAL; INTRAMUSCULAR; INTRAVENOUS; SOFT TISSUE PRN
Status: DISCONTINUED | OUTPATIENT
Start: 2023-07-24 | End: 2023-07-24 | Stop reason: SURG

## 2023-07-24 RX ORDER — PROCHLORPERAZINE EDISYLATE 5 MG/ML
5-10 INJECTION INTRAMUSCULAR; INTRAVENOUS EVERY 4 HOURS PRN
Status: DISCONTINUED | OUTPATIENT
Start: 2023-07-24 | End: 2023-07-25 | Stop reason: HOSPADM

## 2023-07-24 RX ORDER — ACETAMINOPHEN 325 MG/1
650 TABLET ORAL EVERY 6 HOURS PRN
Status: DISCONTINUED | OUTPATIENT
Start: 2023-07-24 | End: 2023-07-25 | Stop reason: HOSPADM

## 2023-07-24 RX ORDER — CLINDAMYCIN PHOSPHATE 600 MG/50ML
600 INJECTION, SOLUTION INTRAVENOUS EVERY 8 HOURS
Status: DISCONTINUED | OUTPATIENT
Start: 2023-07-24 | End: 2023-07-24

## 2023-07-24 RX ORDER — BISACODYL 10 MG
10 SUPPOSITORY, RECTAL RECTAL
Status: DISCONTINUED | OUTPATIENT
Start: 2023-07-24 | End: 2023-07-25 | Stop reason: HOSPADM

## 2023-07-24 RX ORDER — DIPHENHYDRAMINE HYDROCHLORIDE 50 MG/ML
12.5 INJECTION INTRAMUSCULAR; INTRAVENOUS
Status: DISCONTINUED | OUTPATIENT
Start: 2023-07-24 | End: 2023-07-24 | Stop reason: HOSPADM

## 2023-07-24 RX ORDER — LABETALOL HYDROCHLORIDE 5 MG/ML
10 INJECTION, SOLUTION INTRAVENOUS EVERY 4 HOURS PRN
Status: DISCONTINUED | OUTPATIENT
Start: 2023-07-24 | End: 2023-07-25 | Stop reason: HOSPADM

## 2023-07-24 RX ORDER — NICOTINE 21 MG/24HR
21 PATCH, TRANSDERMAL 24 HOURS TRANSDERMAL
Status: DISCONTINUED | OUTPATIENT
Start: 2023-07-24 | End: 2023-07-25 | Stop reason: HOSPADM

## 2023-07-24 RX ORDER — SODIUM CHLORIDE, SODIUM LACTATE, POTASSIUM CHLORIDE, CALCIUM CHLORIDE 600; 310; 30; 20 MG/100ML; MG/100ML; MG/100ML; MG/100ML
INJECTION, SOLUTION INTRAVENOUS
Status: DISCONTINUED | OUTPATIENT
Start: 2023-07-24 | End: 2023-07-24 | Stop reason: SURG

## 2023-07-24 RX ORDER — BUPIVACAINE HYDROCHLORIDE AND EPINEPHRINE 5; 5 MG/ML; UG/ML
INJECTION, SOLUTION EPIDURAL; INTRACAUDAL; PERINEURAL
Status: DISCONTINUED | OUTPATIENT
Start: 2023-07-24 | End: 2023-07-24 | Stop reason: HOSPADM

## 2023-07-24 RX ORDER — PROMETHAZINE HYDROCHLORIDE 25 MG/1
12.5-25 SUPPOSITORY RECTAL EVERY 4 HOURS PRN
Status: DISCONTINUED | OUTPATIENT
Start: 2023-07-24 | End: 2023-07-25 | Stop reason: HOSPADM

## 2023-07-24 RX ORDER — MORPHINE SULFATE 4 MG/ML
4 INJECTION INTRAVENOUS
Status: DISCONTINUED | OUTPATIENT
Start: 2023-07-24 | End: 2023-07-25 | Stop reason: HOSPADM

## 2023-07-24 RX ORDER — SODIUM CHLORIDE, SODIUM LACTATE, POTASSIUM CHLORIDE, CALCIUM CHLORIDE 600; 310; 30; 20 MG/100ML; MG/100ML; MG/100ML; MG/100ML
INJECTION, SOLUTION INTRAVENOUS CONTINUOUS
Status: DISCONTINUED | OUTPATIENT
Start: 2023-07-24 | End: 2023-07-24 | Stop reason: HOSPADM

## 2023-07-24 RX ORDER — OXYCODONE HYDROCHLORIDE 5 MG/1
5 TABLET ORAL
Status: DISCONTINUED | OUTPATIENT
Start: 2023-07-24 | End: 2023-07-25 | Stop reason: HOSPADM

## 2023-07-24 RX ORDER — ONDANSETRON 4 MG/1
4 TABLET, ORALLY DISINTEGRATING ORAL EVERY 4 HOURS PRN
Status: DISCONTINUED | OUTPATIENT
Start: 2023-07-24 | End: 2023-07-25 | Stop reason: HOSPADM

## 2023-07-24 RX ORDER — PROMETHAZINE HYDROCHLORIDE 25 MG/1
12.5-25 TABLET ORAL EVERY 4 HOURS PRN
Status: DISCONTINUED | OUTPATIENT
Start: 2023-07-24 | End: 2023-07-25 | Stop reason: HOSPADM

## 2023-07-24 RX ORDER — OXYCODONE HCL 5 MG/5 ML
5 SOLUTION, ORAL ORAL
Status: COMPLETED | OUTPATIENT
Start: 2023-07-24 | End: 2023-07-24

## 2023-07-24 RX ORDER — OXYCODONE HYDROCHLORIDE 10 MG/1
10 TABLET ORAL
Status: DISCONTINUED | OUTPATIENT
Start: 2023-07-24 | End: 2023-07-25 | Stop reason: HOSPADM

## 2023-07-24 RX ORDER — CHLORHEXIDINE GLUCONATE ORAL RINSE 1.2 MG/ML
15 SOLUTION DENTAL 2 TIMES DAILY
Status: DISCONTINUED | OUTPATIENT
Start: 2023-07-24 | End: 2023-07-25 | Stop reason: HOSPADM

## 2023-07-24 RX ORDER — ENOXAPARIN SODIUM 100 MG/ML
40 INJECTION SUBCUTANEOUS DAILY
Status: DISCONTINUED | OUTPATIENT
Start: 2023-07-24 | End: 2023-07-25 | Stop reason: HOSPADM

## 2023-07-24 RX ADMIN — FENTANYL CITRATE 100 MCG: 50 INJECTION, SOLUTION INTRAMUSCULAR; INTRAVENOUS at 20:32

## 2023-07-24 RX ADMIN — PROPOFOL 30 MG: 10 INJECTION, EMULSION INTRAVENOUS at 20:38

## 2023-07-24 RX ADMIN — FENTANYL CITRATE 50 MCG: 50 INJECTION, SOLUTION INTRAMUSCULAR; INTRAVENOUS at 21:50

## 2023-07-24 RX ADMIN — AMPICILLIN AND SULBACTAM 3 G: 1; 2 INJECTION, POWDER, FOR SOLUTION INTRAMUSCULAR; INTRAVENOUS at 17:12

## 2023-07-24 RX ADMIN — DEXAMETHASONE SODIUM PHOSPHATE 8 MG: 4 INJECTION INTRA-ARTICULAR; INTRALESIONAL; INTRAMUSCULAR; INTRAVENOUS; SOFT TISSUE at 20:56

## 2023-07-24 RX ADMIN — LIDOCAINE HYDROCHLORIDE 30 MG: 20 INJECTION, SOLUTION EPIDURAL; INFILTRATION; INTRACAUDAL at 20:38

## 2023-07-24 RX ADMIN — CLINDAMYCIN PHOSPHATE 600 MG: 600 INJECTION, SOLUTION INTRAVENOUS at 01:00

## 2023-07-24 RX ADMIN — NICOTINE TRANSDERMAL SYSTEM 21 MG: 21 PATCH, EXTENDED RELEASE TRANSDERMAL at 06:02

## 2023-07-24 RX ADMIN — IOHEXOL 80 ML: 350 INJECTION, SOLUTION INTRAVENOUS at 00:00

## 2023-07-24 RX ADMIN — FENTANYL CITRATE 50 MCG: 50 INJECTION, SOLUTION INTRAMUSCULAR; INTRAVENOUS at 21:44

## 2023-07-24 RX ADMIN — SODIUM CHLORIDE, POTASSIUM CHLORIDE, SODIUM LACTATE AND CALCIUM CHLORIDE: 600; 310; 30; 20 INJECTION, SOLUTION INTRAVENOUS at 20:48

## 2023-07-24 RX ADMIN — LIDOCAINE HYDROCHLORIDE 50 MG: 20 INJECTION, SOLUTION EPIDURAL; INFILTRATION; INTRACAUDAL at 20:35

## 2023-07-24 RX ADMIN — PROPOFOL 170 MG: 10 INJECTION, EMULSION INTRAVENOUS at 20:35

## 2023-07-24 RX ADMIN — OXYCODONE HYDROCHLORIDE 10 MG: 5 SOLUTION ORAL at 22:15

## 2023-07-24 RX ADMIN — VANCOMYCIN HYDROCHLORIDE 1500 MG: 5 INJECTION, POWDER, LYOPHILIZED, FOR SOLUTION INTRAVENOUS at 17:44

## 2023-07-24 RX ADMIN — OXYCODONE HYDROCHLORIDE 5 MG: 5 TABLET ORAL at 01:01

## 2023-07-24 RX ADMIN — HYDROMORPHONE HYDROCHLORIDE 0.4 MG: 1 INJECTION, SOLUTION INTRAMUSCULAR; INTRAVENOUS; SUBCUTANEOUS at 22:16

## 2023-07-24 RX ADMIN — OXYCODONE HYDROCHLORIDE 5 MG: 5 TABLET ORAL at 06:02

## 2023-07-24 RX ADMIN — ENOXAPARIN SODIUM 40 MG: 100 INJECTION SUBCUTANEOUS at 01:01

## 2023-07-24 RX ADMIN — CLINDAMYCIN PHOSPHATE 600 MG: 600 INJECTION, SOLUTION INTRAVENOUS at 05:58

## 2023-07-24 ASSESSMENT — LIFESTYLE VARIABLES
TOTAL SCORE: 0
DOES PATIENT WANT TO STOP DRINKING: NO
AVERAGE NUMBER OF DAYS PER WEEK YOU HAVE A DRINK CONTAINING ALCOHOL: 0
HAVE PEOPLE ANNOYED YOU BY CRITICIZING YOUR DRINKING: NO
HAVE YOU EVER FELT YOU SHOULD CUT DOWN ON YOUR DRINKING: NO
EVER FELT BAD OR GUILTY ABOUT YOUR DRINKING: NO
ALCOHOL_USE: NO
TOTAL SCORE: 0
SUBSTANCE_ABUSE: 0
TOTAL SCORE: 0
ON A TYPICAL DAY WHEN YOU DRINK ALCOHOL HOW MANY DRINKS DO YOU HAVE: 0
HOW MANY TIMES IN THE PAST YEAR HAVE YOU HAD 5 OR MORE DRINKS IN A DAY: 0
CONSUMPTION TOTAL: NEGATIVE
EVER HAD A DRINK FIRST THING IN THE MORNING TO STEADY YOUR NERVES TO GET RID OF A HANGOVER: NO

## 2023-07-24 ASSESSMENT — ENCOUNTER SYMPTOMS
FOCAL WEAKNESS: 0
PHOTOPHOBIA: 0
FLANK PAIN: 0
HALLUCINATIONS: 0
VOMITING: 0
BRUISES/BLEEDS EASILY: 0
WEIGHT LOSS: 0
SPUTUM PRODUCTION: 0
NAUSEA: 0
BACK PAIN: 0
HEARTBURN: 0
SPEECH CHANGE: 0
PALPITATIONS: 0
COUGH: 0
HEMOPTYSIS: 0
HEADACHES: 0
BLURRED VISION: 0
ORTHOPNEA: 0
POLYDIPSIA: 0
FEVER: 0
DOUBLE VISION: 0
NECK PAIN: 0
NERVOUS/ANXIOUS: 0
TREMORS: 0
CHILLS: 0

## 2023-07-24 ASSESSMENT — PAIN DESCRIPTION - PAIN TYPE
TYPE: SURGICAL PAIN
TYPE: ACUTE PAIN;SURGICAL PAIN
TYPE: SURGICAL PAIN
TYPE: ACUTE PAIN
TYPE: SURGICAL PAIN

## 2023-07-24 ASSESSMENT — PAIN SCALES - GENERAL: PAIN_LEVEL: 3

## 2023-07-24 ASSESSMENT — PATIENT HEALTH QUESTIONNAIRE - PHQ9
2. FEELING DOWN, DEPRESSED, IRRITABLE, OR HOPELESS: NOT AT ALL
1. LITTLE INTEREST OR PLEASURE IN DOING THINGS: NOT AT ALL
SUM OF ALL RESPONSES TO PHQ9 QUESTIONS 1 AND 2: 0
2. FEELING DOWN, DEPRESSED, IRRITABLE, OR HOPELESS: NOT AT ALL
1. LITTLE INTEREST OR PLEASURE IN DOING THINGS: NOT AT ALL
SUM OF ALL RESPONSES TO PHQ9 QUESTIONS 1 AND 2: 0

## 2023-07-24 ASSESSMENT — FIBROSIS 4 INDEX: FIB4 SCORE: 0.69

## 2023-07-24 NOTE — ED NOTES
Pt from traige to room 60, ambulatory. Pt is alert and orientedx4. GCS 15. Call light within reach.

## 2023-07-24 NOTE — PROGRESS NOTES
Assumed care of patient. Pt is sleeping in bed, vital signs stable. Call light in reach, no fall risks noted

## 2023-07-24 NOTE — PROGRESS NOTES
Upon attempting to hang the patient's 1300 Unasyn abx, IV was noted to be infiltrated and not flushing. VAT team notified, ED team notified for assistance as pt is very hard stick with limited venous access options.   Dr Salgado aware in delay in antibiotic therapy. Will contact pharmacy once line is placed.

## 2023-07-24 NOTE — PROGRESS NOTES
Assumed care of patient and heard report from Sunny GEE.  Admit profile and assessment completed, patient is A&Ox 4, reports 6/10 pain and is medicated per MAR, on room air, provided with food.  Patient updated on plan of care and all needs addressed at this time.  Upper bed rails in place, bed in lowest locked setting, non slip socks on, belongings and call light in reach, falls precaution and safety education reinforced, patient verbalized understanding.

## 2023-07-24 NOTE — CARE PLAN
The patient is Stable - Low risk of patient condition declining or worsening    Shift Goals  Clinical Goals: pain control, iv abx  Patient Goals: comfort, rest    Progress made toward(s) clinical / shift goals:  Pain management progressing through medication (see MAR), rest, positioning, and distraction.  Patient educated on plan of care, medications, side effects, non-pharmalogical pain control, and safety/fall precautions.        Problem: Knowledge Deficit - Standard  Goal: Patient and family/care givers will demonstrate understanding of plan of care, disease process/condition, diagnostic tests and medications  Description: Target End Date:  1-3 days or as soon as patient condition allows    Document in Patient Education    1.  Patient and family/caregiver oriented to unit, equipment, visitation policy and means for communicating concern  2.  Complete/review Learning Assessment  3.  Assess knowledge level of disease process/condition, treatment plan, diagnostic tests and medications  4.  Explain disease process/condition, treatment plan, diagnostic tests and medications  Outcome: Progressing     Problem: Pain - Standard  Goal: Alleviation of pain or a reduction in pain to the patient’s comfort goal  Description: Target End Date:  Prior to discharge or change in level of care    Document on Vitals flowsheet    1.  Document pain using the appropriate pain scale per order or unit policy  2.  Educate and implement non-pharmacologic comfort measures (i.e. relaxation, distraction, massage, cold/heat therapy, etc.)  3.  Pain management medications as ordered  4.  Reassess pain after pain med administration per policy  5.  If opiods administered assess patient's response to pain medication is appropriate per POSS sedation scale  6.  Follow pain management plan developed in collaboration with patient and interdisciplinary team (including palliative care or pain specialists if applicable)  Outcome: Progressing

## 2023-07-24 NOTE — PROGRESS NOTES
Received call from lab/microbiology re:Gram pos cocci and rods noted positive in blood cultures, informed Dr Salgado

## 2023-07-24 NOTE — PROGRESS NOTES
"Pharmacy Vancomycin Kinetics Note for 7/24/2023     33 y.o. female on Vancomycin day #  1     Vancomycin Indication (AUC Dosing): Bacteremia    Provider specified end date: 07/29/23    Active Antibiotics (From admission, onward)      Ordered     Ordering Provider       Mon Jul 24, 2023  2:42 PM    07/24/23 1442  vancomycin (Vancocin) 1,500 mg in  mL IVPB  (vancomycin (VANCOCIN) IV (LD + Maintenance))  ONCE         Rey Salgado M.D.       Mon Jul 24, 2023  2:14 PM    07/24/23 1414  MD Alert...Vancomycin per Pharmacy  PHARMACY TO DOSE        Question:  Indication(s) for vancomycin?  Answer:  Staphylococcus aureus bacteremia    Rey Salgado M.D.       Mon Jul 24, 2023 11:25 AM    07/24/23 1125  ampicillin/sulbactam (Unasyn) 3 g in  mL IVPB  EVERY 6 HOURS        Note to Pharmacy: Per P&T Pharmacist Clinical Intervention Protocol    Rey Salgado M.D.            Dosing Weight: 62.6 kg (138 lb 0.1 oz)      Admission History: Admitted on 7/23/2023 for Facial cellulitis [L03.211]  Bacteremia [R78.81]  Pertinent history: Admitted for facial cellulitis, found to have positive blood culture    Allergies:     Clonazepam, Gabapentin, Gabadone, and Tiagabine hydrochloride     Pertinent cultures to date:     Results       Procedure Component Value Units Date/Time    Blood Culture [[id number]]  (Abnormal) Collected: 07/23/23 2317    Order Status: Completed Specimen: Blood from Peripheral Updated: 07/24/23 1131     Significant Indicator POS     Source BLD     Site PERIPHERAL     Culture Result Growth detected by Bactec instrument. 07/24/2023  11:28  Gram Stain: Gram positive cocci              and  Gram positive rods.      Narrative:      2 of 2 blood culture x2  Sites order. Per Hospital Policy:  Only change Specimen Src: to \"Line\" if specified by physician  order.  Right Forearm/Arm    Blood Culture [[id number]] Collected: 07/23/23 2244    Order Status: Completed Specimen: Blood from Peripheral Updated: 07/24/23 0613     " "Significant Indicator NEG     Source BLD     Site PERIPHERAL     Culture Result No Growth  Note: Blood cultures are incubated for 5 days and  are monitored continuously.Positive blood cultures  are called to the RN and reported as soon as  they are identified.      Narrative:      1 of 2 for Blood Culture x 2 sites order. Per Hospital  Policy: Only change Specimen Src: to \"Line\" if specified by  physician order.  No site indicated            Labs:     Estimated Creatinine Clearance: 122.1 mL/min (by C-G formula based on SCr of 0.66 mg/dL).  Recent Labs     23  2244   WBC 7.5   NEUTSPOLYS 59.90     Recent Labs     23  2244   BUN 18   CREATININE 0.66   ALBUMIN 4.3       Intake/Output Summary (Last 24 hours) at 2023 1629  Last data filed at 2023 0400  Gross per 24 hour   Intake 340 ml   Output 0 ml   Net 340 ml      /62   Pulse 68   Temp 36.6 °C (97.9 °F) (Temporal)   Resp 16   Ht 1.753 m (5' 9\")   Wt 62.6 kg (138 lb 0.1 oz)   SpO2 95%  Temp (24hrs), Av.8 °C (98.2 °F), Min:36.6 °C (97.9 °F), Max:36.9 °C (98.5 °F)      List concerns for Vancomycin clearance:          Pharmacokinetics:     AUC kinetics:   Ke (hr ^-1): 0.1057 hr^-1  Half life: 6.56 hr  Clearance: 4.301  Estimated TDD: 2150.5  Estimated Dose: 771  Estimated interval: 8.6      A/P:     -  Vancomycin dose: 1000 mg IV every 12 hours    -  Next vancomycin level(s):    - pending duration of therapy    -  Predicted vancomycin AUC from initial AUC test calculator: 465 mg·hr/L    -  Comments: New start vancomycin.  Recommend following cultures and narrowing therapy as appropriate.      Rachel Tamez, PharmD, BCCCP    "

## 2023-07-24 NOTE — ED PROVIDER NOTES
ER Provider Note    Scribed for Conor Dial M.d. by Ti Pablo. 7/23/2023  8:52 PM    Primary Care Provider: Pcp Pt States None    CHIEF COMPLAINT  Chief Complaint   Patient presents with    Mouth Swelling     The pt reports hx of broken jaw and lower mouth swelling for the last several months. The pt reports going to [place] to get abscess drained last night and the swelling increased. The pt reports 6/10 mouth pain. The pt protecting airway, no excessive drooling. The pt speaking in full complete sentences.    Tooth Abscess     EXTERNAL RECORDS REVIEWED  External ED Note The patient presented to the [facility] ED at midnight this morning for a left lower dental abscess. The abscess was drained at this time and she was discharged. She also presented to [facility] ED on 7/17/23 for similar complaints and was prescribed Augmentin at this time.    HPI/ROS  LIMITATION TO HISTORY   Select: : None    OUTSIDE HISTORIAN(S):  None    Luna River is a 33 y.o. female who presents to the ED for evaluation of right sided facial swelling. Onset today. She states a few years ago she broke her jaw. Then several months ago she noticed swelling to her lower mouth that got progressively worse. She presented to [facility] ED last night for a dental abscess drainage. She was discharged following the drainage. She also presented to the [facility] ED on 7/17/23 for similar complains and was prescribed Augmentin. However, she has been unable to fill the prescription yet. Today when she woke up she noted the swelling to be worse than it was prior to the drainage. Prompting her to present to the ED for further evaluation. She also reports associated symptoms of 6/10 dental pain. She denies any fevers or chills. She reports no new major medical history and denies any history of diabetes or hypertension. However, she does note she had brain cancer as a child, but she is unsure the type of cancer. She takes no  daily medications. She is allergic to gabapentin. She admits to smoking cigarettes and has a history of alcohol consumption and IV opiate use, but states she has not drank alcohol or used recreational drugs recently. She is currently homeless and states she lives between a few different places.     PAST MEDICAL HISTORY  Past Medical History:   Diagnosis Date    Anorexia     Bipolar 2 disorder (HCC)     bulemia     Hepatitis C     Hepatitis C     Impetigo     Liver disease     Major depressive disorder     Mood disorder (HCC)     MRSA infection     Oppositional defiant disorder     Polysubstance abuse (HCC)     Post-traumatic stress disorder     Schizoaffective disorder     Scoliosis        SURGICAL HISTORY  Past Surgical History:   Procedure Laterality Date    IRRIGATION & DEBRIDEMENT GENERAL Bilateral 6/8/2022    Procedure: IRRIGATION AND DEBRIDEMENT, MULTIPLE WOUND BILATERAL LOWER EXTREMITIES;  Surgeon: Dakota Cortes M.D.;  Location: SURGERY [facility];  Service: Orthopedics    IRRIGATION & DEBRIDEMENT GENERAL Right 2/23/2022    Procedure: IRRIGATION AND DEBRIDEMENT, WOUND- RIGHT THIGH AND BUTTOCK;  Surgeon: Dakota Cortes M.D.;  Location: [facility];  Service: Orthopedics    YONATAN BY LAPAROSCOPY  2/1/2013    Performed by Yves Rooney M.D. at SURGERY [facility] ORS    OTHER      dental    MT REMOVAL OF TONSILS,<13 Y/O      TONSILLECTOMY         FAMILY HISTORY  History reviewed. No pertinent family history.    SOCIAL HISTORY   reports that she has been smoking cigarettes. She has a 1.75 pack-year smoking history. She has never used smokeless tobacco. She reports that she does not currently use alcohol. She reports that she does not currently use drugs after having used the following drugs: Intravenous and Injected (Skin Popping).    CURRENT MEDICATIONS  Current medications can be seen on the nurse's note.     ALLERGIES  Clonazepam, Gabapentin, Gabadone, and Tiagabine hydrochloride    PHYSICAL  "EXAM  /79   Pulse (!) 113   Temp 36.6 °C (97.9 °F) (Temporal)   Resp 18   Ht 1.753 m (5' 9\")   Wt 62.6 kg (138 lb 0.1 oz)   SpO2 98%   BMI 20.38 kg/m²   Constitutional: Patient is unkempt.  Mild to moderate distress.  HENT:Normocephalic, Atraumatic, Bilateral external ears normal, Oropharynx moist, poor dentition with multiple dental caries and essentially all teeth are eroded with no enamel some down to the gums.  The significant swelling on the left side of the face.  The jaw with some surrounding erythema consistent with an abscess.  Eyes: PERRL, EOMI, Conjunctiva normal, No discharge.   Neck: Normal range of motion, No tenderness, Supple, No stridor.   Cardiovascular: Normal heart rate, Normal rhythm, No murmurs, No rubs,   Thorax & Lungs: Normal breath sounds, No respiratory distress, No wheezing,     Back: No tenderness, No CVA tenderness.   Musculoskeletal: Good range of motion in all major joints.  Neurologic: Alert, No focal deficits noted.   l       DIAGNOSTIC STUDIES    Labs:   CBC, CMP, lipase and cultures are pending.    Radiology:   The attending emergency physician has independently interpreted the diagnostic imaging associated with this visit and am waiting the final reading from the radiologist.   Preliminary interpretation is a follows: Caries are seen on the maxillary x-ray.  Same CT is pending.  Radiologist interpretation:     [id number]   Final Result      Numerous caries      CT-MAXILLOFACIAL WITH PLUS RECONS    (Results Pending)        COURSE & MEDICAL DECISION MAKING     ED Observation Status? Yes; I am placing the patient in to an observation status due to a diagnostic uncertainty as well as therapeutic intensity. Patient placed in observation status at 9:00 PM, 7/23/2023.     Observation plan is as follows: Monitor for symptom management and diagnostic results.       INITIAL ASSESSMENT, COURSE AND PLAN  Care Narrative: Records reviewed for patient's baseline labs and " vitals.     8:52 PM - Patient seen and examined at bedside. Discussed plan of care, including diagnostic work-up followed by re-evaluation. Patient agrees to the plan of care. The patient will be medicated with Unasyn 3 g in  mL IVPB. Ordered for DX-Mandible panoramic, CT-maxillofacial with plus recons, CMP, CBC with differential, Lactic acid, and Blood culture x2 to evaluate her symptoms. Differential diagnoses include but not limited to:   Abscess, dental caries, early sub mandibular infection.    Patient with IV drug abuse she will be difficult stick I ordered some basic labs and IV antibiotics and a CTA.  She has poor dentition and she has had 2 previous visits in the last several days at [facility] been unable to fill her antibiotics and reports continued worsening of the symptoms.  I think she needs a CT, IV antibiotics and likely to be hospitalized for definitive care.    At the end of my shift we have been unable to` like labs because she has a history of IV drug abuse difficult stick.  Care will be transferred to my partner for further work-up and treatment.        ADDITIONAL PROBLEM LIST  Homelessness  Opioid dependency  Noncompliance.    DISPOSITION AND DISCUSSIONS  I have discussed management of the patient with the following physicians and CATHY's: He is transferred to my partner Dr. Elizondo at 10 PM        Barriers to care at this time, including but not limited to: Patient does not have established PCP and Patient is homeless.         FINAL DIAGNOSIS  1. Dental abscess    2. Dental infection    3. Facial cellulitis    4. Homelessness         Ti BOONE), am scribing for, and in the presence of, Conor Dial M.D..    Electronically signed by: Ti Pacheco), 7/23/2023    Conor BOONE M.D. personally performed the services described in this documentation, as scribed by Ti Pablo in my presence, and it is both accurate and complete.     The note  accurately reflects work and decisions made by me.  Conor Dial M.D.  7/23/2023  9:59 PM

## 2023-07-24 NOTE — ED TRIAGE NOTES
"Chief Complaint   Patient presents with    Mouth Swelling     The pt reports hx of broken jaw and lower mouth swelling for the last several months. The pt reports going to [place] to get abscess drained last night and the swelling increased. The pt reports 6/10 mouth pain. The pt protecting airway, no excessive drooling. The pt speaking in full complete sentences.    Tooth Abscess       Pt ambulatory to triage. Pt A&Ox4, for the above complaint.     Pt to lobby . Pt educated on alerting staff in changes to condition. Pt verbalized understanding.    /79   Pulse (!) 113   Temp 36.6 °C (97.9 °F) (Temporal)   Resp 18   Ht 1.753 m (5' 9\")   Wt 62.6 kg (138 lb 0.1 oz)   SpO2 98%   BMI 20.38 kg/m²     "

## 2023-07-24 NOTE — ASSESSMENT & PLAN NOTE
CT of the maxillofacial area noted.  Currently does not appear septic.  S/p extraction of all teeth and I&D by Dr. Jarrell on 7/24  Continue IV antibiotics for now  Pain management with Tylenol, oxycodone and morphine as needed  Now edentulous - advance to soft diet as tolerated.

## 2023-07-24 NOTE — PROGRESS NOTES
Patient seen and evaluated bedside.  I have consulted oral surgery Dr. Truong Jarrell.  Patient will be kept n.p.o. and will undergo procedure later in the afternoon.  Continue IV antibiotics and pain control  Blood culture returned positive for gram-positive cocci and rods.  I have consulted infectious disease.  Started on IV Unasyn and IV vancomycin

## 2023-07-24 NOTE — ASSESSMENT & PLAN NOTE
Provided 3 mins on Tobacco cessation education .  Patch and gum provided  Discussed outpatient options

## 2023-07-24 NOTE — PROGRESS NOTES
"ED Observation Progress Note    Date of Service: 07/23/23    Interval History and Interventions  Patient's care signed out to me by my partner, Dr. Dial.  There was a follow-up on lab and CT face results.  Labs demonstrate no leukocytosis.  Blood cultures x2 were collected and are pending.  Patient was given Unasyn.  CT is as below    CT-MAXILLOFACIAL WITH PLUS RECONS   Final Result      1.  Extensive dental disease with probable small bilateral maxillary tooth abscesses, worse on the LEFT.   2.  No discrete soft tissue abscess however inflammatory changes overlie the mandible and maxilla anteriorly, worse on the LEFT.      [id number]   Final Result      Numerous caries        11:52 PM patient was reevaluated at bedside.  I discussed results with patient.  Given she has failed outpatient antibiotics, I recommend that we admit her to the hospital for further evaluation and management such as IV antibiotics.  Patient is agreeable to plan.  All questions answered.    12:08 AM I discussed the patient's case with hospitalist, Dr. Rivas, who agreed to admit the patient to the hospital.     Physical Exam  /57   Pulse 75   Temp 36.6 °C (97.9 °F) (Temporal)   Resp 18   Ht 1.753 m (5' 9\")   Wt 62.6 kg (138 lb 0.1 oz)   SpO2 96%   BMI 20.38 kg/m² .    Constitutional: Awake and alert. Nontoxic  HENT:  Grossly normal, poor dentition, significant left lower jaw swelling. No submandibular hardness.   Eyes: Grossly normal  Neck: Normal range of motion  Cardiovascular: Normal heart rate   Thorax & Lungs: No respiratory distress  Abdomen: Nontender  Skin:  No pathologic rash.   Extremities: Well perfused  Psychiatric: Affect normal    Labs  Abnormal Labs Reviewed   COMP METABOLIC PANEL - Abnormal; Notable for the following components:       Result Value    Potassium 3.2 (*)     Alkaline Phosphatase 107 (*)     All other components within normal limits   CBC WITH DIFFERENTIAL - Abnormal; Notable for the " following components:    RBC 4.15 (*)     Hemoglobin 11.9 (*)     Hematocrit 36.1 (*)     All other components within normal limits         Radiology  CT-MAXILLOFACIAL WITH PLUS RECONS   Final Result      1.  Extensive dental disease with probable small bilateral maxillary tooth abscesses, worse on the LEFT.   2.  No discrete soft tissue abscess however inflammatory changes overlie the mandible and maxilla anteriorly, worse on the LEFT.      [id number]   Final Result      Numerous caries        Patient admitted to the hospitalist, Dr. Rivas, in guarded condition.  Patient agreeable to plan with no further questions.    Problem List  1. Dental abscess  2. Dental infection  3. Facial cellulitis  4. Homelessness    Electronically signed by: Savannah Elizondo M.D., 7/23/2023 10:32 PM

## 2023-07-24 NOTE — PROGRESS NOTES
4 Eyes Skin Assessment Completed by GERARD Banegas and GERARD Dorantes.    Head Swelling around jaw  Ears WDL  Nose WDL  Mouth Redness and Discoloration, swelling L side  Neck Swelling L side  Breast/Chest Abrasion  Shoulder Blades WDL  Spine WDL  (R) Arm/Elbow/Hand Redness, Bruising, Abrasion, Scab, and Discoloration  (L) Arm/Elbow/Hand Redness, Bruising, Abrasion, Scab, and Discoloration  Abdomen WDL  Groin WDL  Scrotum/Coccyx/Buttocks WDL  (R) Leg Redness, Scab, Bruising, and Abrasion  (L) Leg Redness, Scab, Bruising, and Abrasion  (R) Heel/Foot/Toe Redness, Discoloration, Bruising, and Scab  (L) Heel/Foot/Toe Redness, Discoloration, Bruising, and Scab          Devices In Places Blood Pressure Cuff and Pulse Ox      Interventions In Place Pillows    Possible Skin Injury No    Pictures Uploaded Into Epic N/A  Wound Consult Placed N/A  RN Wound Prevention Protocol Ordered No

## 2023-07-24 NOTE — CARE PLAN
The patient is   Problem: Knowledge Deficit - Standard  Goal: Patient and family/care givers will demonstrate understanding of plan of care, disease process/condition, diagnostic tests and medications  7/24/2023 0817 by Shalonda Mehta R.N.  Outcome: Progressing  7/24/2023 0817 by NGUYỄN CmN.  Outcome: Progressing     Problem: Pain - Standard  Goal: Alleviation of pain or a reduction in pain to the patient’s comfort goal  7/24/2023 0817 by Shalonda Mehta RMattN.  Outcome: Progressing  7/24/2023 0817 by NGUYỄN CmN.  Outcome: Progressing       Shift Goals  Clinical Goals: pain control, iv abx  Patient Goals: comfort, rest    Progress made toward(s) clinical / shift goals:  infection control    Patient is not progressing towards the following goals:knowledge deficit regarding diet

## 2023-07-24 NOTE — PROGRESS NOTES
Through the afternoon multiple attempts were made to gain IV access with no success.   VAT team has one RN on for today and will be here shortly.   ED charge contacted x2 for staff request, no one available.

## 2023-07-24 NOTE — H&P
Hospital Medicine History & Physical Note    Date of Service  7/24/2023    Primary Care Physician  Pcp Pt States None        Code Status  Full Code    Chief Complaint  Chief Complaint   Patient presents with    Mouth Swelling     The pt reports hx of broken jaw and lower mouth swelling for the last several months. The pt reports going to [place] to get abscess drained last night and the swelling increased. The pt reports 6/10 mouth pain. The pt protecting airway, no excessive drooling. The pt speaking in full complete sentences.    Tooth Abscess       History of Presenting Illness  Luna River is a 33 y.o. female with past medical history of bipolar disorder, schizoaffective disorder, PTSD, hepatitis C, homelessness, alcohol use, IV opiates, who presented 7/23/2023 with complaints of swelling of the left lower face for over 1 months, dull and sharp pain worsening with mastication.  She was seen at [facility] ED 2 times on 7/17 when she was prescribed Augmentin but was not able to refill it and yesterday when she got I&D for gingival abscess, prescribed clindamycin but did not pick it up.  Due to worsening of pain and swelling she came to ED today.  CT of maxillofacial showed small bilateral maxillary tooth abscesses, worst on the left, no discrete soft tissue abscess.  She was given Unasyn.  Oral surgery has not been consulted.  Asked to admit for IV antibiotics and oral surgery consult in a.m.  I discussed the plan of care with patient and ERP .    Review of Systems  Review of Systems   Constitutional:  Negative for chills, fever and weight loss.   HENT:  Negative for ear pain, hearing loss and tinnitus.         Face swelling, dental pain   Eyes:  Negative for blurred vision, double vision and photophobia.   Respiratory:  Negative for cough, hemoptysis and sputum production.    Cardiovascular:  Negative for chest pain, palpitations and orthopnea.   Gastrointestinal:  Negative for heartburn,  nausea and vomiting.   Genitourinary:  Negative for dysuria, flank pain, frequency and hematuria.   Musculoskeletal:  Negative for back pain, joint pain and neck pain.   Skin:  Negative for itching and rash.   Neurological:  Negative for tremors, speech change, focal weakness and headaches.   Endo/Heme/Allergies:  Negative for environmental allergies and polydipsia. Does not bruise/bleed easily.   Psychiatric/Behavioral:  Negative for hallucinations and substance abuse. The patient is not nervous/anxious.        Past Medical History   has a past medical history of Anorexia, Bipolar 2 disorder (HCC), bulemia, Hepatitis C, Hepatitis C, Impetigo, Liver disease, Major depressive disorder, Mood disorder (HCC), MRSA infection, Oppositional defiant disorder, Polysubstance abuse (HCC), Post-traumatic stress disorder, Schizoaffective disorder, and Scoliosis.    Surgical History   has a past surgical history that includes tonsillectomy; pr removal of tonsils,<13 y/o; other; anthony by laparoscopy (2/1/2013); irrigation & debridement general (Right, 2/23/2022); and irrigation & debridement general (Bilateral, 6/8/2022).     Family History  family history is not on file.   Family history reviewed with patient. There is no family history that is pertinent to the chief complaint.     Social History   reports that she has been smoking cigarettes. She has a 1.75 pack-year smoking history. She has never used smokeless tobacco. She reports that she does not currently use alcohol. She reports that she does not currently use drugs after having used the following drugs: Intravenous and Injected (Skin Popping).    Allergies  Allergies   Allergen Reactions    Clonazepam Unspecified     Pt states that she overdosed on it before. Furthermore, per historical, pt had seizure-like activity while taking Clonazepam as a child.    Gabapentin Unspecified     Causes seizures.    Gabadone Unspecified     Unknown reaction.    Tiagabine Hydrochloride  Unspecified     Unknown reaction.       Medications  None       Physical Exam  Temp:  [36.6 °C (97.9 °F)] 36.6 °C (97.9 °F)  Pulse:  [] 75  Resp:  [18] 18  BP: (112-122)/(57-80) 117/57  SpO2:  [96 %-100 %] 96 %  Blood Pressure: 117/57   Temperature: 36.6 °C (97.9 °F)   Pulse: 75   Respiration: 18   Pulse Oximetry: 96 %       Physical Exam  Vitals and nursing note reviewed.   Constitutional:       General: She is not in acute distress.     Appearance: Normal appearance.   HENT:      Head: Normocephalic and atraumatic.      Comments: Left lower face swelling, tenderness to palpation  Poor dentition     Nose: Nose normal.      Mouth/Throat:      Mouth: Mucous membranes are moist.   Eyes:      Extraocular Movements: Extraocular movements intact.      Pupils: Pupils are equal, round, and reactive to light.   Cardiovascular:      Rate and Rhythm: Normal rate and regular rhythm.   Pulmonary:      Effort: Pulmonary effort is normal.      Breath sounds: Normal breath sounds.   Abdominal:      General: Abdomen is flat. There is no distension.      Tenderness: There is no abdominal tenderness.   Musculoskeletal:         General: No swelling or deformity. Normal range of motion.      Cervical back: Normal range of motion and neck supple.   Skin:     General: Skin is warm and dry.   Neurological:      General: No focal deficit present.      Mental Status: She is alert and oriented to person, place, and time.   Psychiatric:         Mood and Affect: Mood normal.         Behavior: Behavior normal.         Laboratory:  Recent Labs     07/23/23 2244   WBC 7.5   RBC 4.15*   HEMOGLOBIN 11.9*   HEMATOCRIT 36.1*   MCV 87.0   MCH 28.7   MCHC 33.0   RDW 41.7   PLATELETCT 236   MPV 9.4     Recent Labs     07/23/23 2244   SODIUM 140   POTASSIUM 3.2*   CHLORIDE 103   CO2 26   GLUCOSE 91   BUN 18   CREATININE 0.66   CALCIUM 9.4     Recent Labs     07/23/23 2244   ALTSGPT 16   ASTSGOT 21   ALKPHOSPHAT 107*   TBILIRUBIN 0.4   GLUCOSE 91          No results for input(s): NTPROBNP in the last 72 hours.      No results for input(s): TROPONINT in the last 72 hours.    Imaging:  CT-MAXILLOFACIAL WITH PLUS RECONS   Final Result      1.  Extensive dental disease with probable small bilateral maxillary tooth abscesses, worse on the LEFT.   2.  No discrete soft tissue abscess however inflammatory changes overlie the mandible and maxilla anteriorly, worse on the LEFT.      [id number]   Final Result      Numerous caries              Assessment/Plan:  Justification for Admission Status  I anticipate this patient is appropriate for observation status at this time because facial cellulitis    Patient will need a Med/Surg bed on MEDICAL service .  The need is secondary to facial cellulitis.    * Facial cellulitis, odontogenic- (present on admission)  Assessment & Plan  CT of the maxillofacial area noted.  Currently is not septic.  Homeless and was not able to fill out prescriptions  Plan to treat with IV antibiotics.  Ordered IV clindamycin since history of MRSA  Pain management with Tylenol, oxycodone and morphine as needed  We will plan to consult oral surgery for tooth extraction    Hypokalemia  Assessment & Plan  P.o. replacement    Tobacco abuse- (present on admission)  Assessment & Plan  Spent approx 5 mins on Tobacco cessation education . Discussed options of nicotine patch, medical treatment with wellbutrin and chantix. Discussed the benefits of quitting smoking and risks of continued smoking including cardiovascular disease, cancer and COPD.   Code 36645          VTE prophylaxis: enoxaparin ppx

## 2023-07-25 ENCOUNTER — APPOINTMENT (OUTPATIENT)
Dept: RADIOLOGY | Facility: MEDICAL CENTER | Age: 33
DRG: 141 | End: 2023-07-25
Payer: COMMERCIAL

## 2023-07-25 VITALS
SYSTOLIC BLOOD PRESSURE: 108 MMHG | DIASTOLIC BLOOD PRESSURE: 67 MMHG | BODY MASS INDEX: 20.44 KG/M2 | HEIGHT: 69 IN | WEIGHT: 138.01 LBS | OXYGEN SATURATION: 93 % | TEMPERATURE: 97.7 F | HEART RATE: 66 BPM | RESPIRATION RATE: 16 BRPM

## 2023-07-25 LAB
ALBUMIN SERPL BCP-MCNC: 3.4 G/DL (ref 3.2–4.9)
ALBUMIN/GLOB SERPL: 1.3 G/DL
ALP SERPL-CCNC: 124 U/L (ref 30–99)
ALT SERPL-CCNC: 16 U/L (ref 2–50)
ANION GAP SERPL CALC-SCNC: 11 MMOL/L (ref 7–16)
AST SERPL-CCNC: 28 U/L (ref 12–45)
BACTERIA BLD CULT: ABNORMAL
BASOPHILS # BLD AUTO: 0 % (ref 0–1.8)
BASOPHILS # BLD: 0 K/UL (ref 0–0.12)
BILIRUB SERPL-MCNC: 0.2 MG/DL (ref 0.1–1.5)
BUN SERPL-MCNC: 9 MG/DL (ref 8–22)
CALCIUM ALBUM COR SERPL-MCNC: 9.3 MG/DL (ref 8.5–10.5)
CALCIUM SERPL-MCNC: 8.8 MG/DL (ref 8.5–10.5)
CHLORIDE SERPL-SCNC: 102 MMOL/L (ref 96–112)
CO2 SERPL-SCNC: 24 MMOL/L (ref 20–33)
CREAT SERPL-MCNC: 0.41 MG/DL (ref 0.5–1.4)
EOSINOPHIL # BLD AUTO: 0 K/UL (ref 0–0.51)
EOSINOPHIL NFR BLD: 0 % (ref 0–6.9)
ERYTHROCYTE [DISTWIDTH] IN BLOOD BY AUTOMATED COUNT: 41.7 FL (ref 35.9–50)
GFR SERPLBLD CREATININE-BSD FMLA CKD-EPI: 134 ML/MIN/1.73 M 2
GLOBULIN SER CALC-MCNC: 2.7 G/DL (ref 1.9–3.5)
GLUCOSE SERPL-MCNC: 178 MG/DL (ref 65–99)
HCG SERPL QL: NEGATIVE
HCT VFR BLD AUTO: 34.1 % (ref 37–47)
HGB BLD-MCNC: 11.4 G/DL (ref 12–16)
IMM GRANULOCYTES # BLD AUTO: 0.02 K/UL (ref 0–0.11)
IMM GRANULOCYTES NFR BLD AUTO: 0.4 % (ref 0–0.9)
LYMPHOCYTES # BLD AUTO: 0.5 K/UL (ref 1–4.8)
LYMPHOCYTES NFR BLD: 10.9 % (ref 22–41)
MCH RBC QN AUTO: 29.2 PG (ref 27–33)
MCHC RBC AUTO-ENTMCNC: 33.4 G/DL (ref 32.2–35.5)
MCV RBC AUTO: 87.4 FL (ref 81.4–97.8)
MONOCYTES # BLD AUTO: 0.07 K/UL (ref 0–0.85)
MONOCYTES NFR BLD AUTO: 1.5 % (ref 0–13.4)
NEUTROPHILS # BLD AUTO: 3.98 K/UL (ref 1.82–7.42)
NEUTROPHILS NFR BLD: 87.2 % (ref 44–72)
NRBC # BLD AUTO: 0 K/UL
NRBC BLD-RTO: 0 /100 WBC (ref 0–0.2)
PLATELET # BLD AUTO: 186 K/UL (ref 164–446)
PMV BLD AUTO: 9.9 FL (ref 9–12.9)
POTASSIUM SERPL-SCNC: 3.5 MMOL/L (ref 3.6–5.5)
PROT SERPL-MCNC: 6.1 G/DL (ref 6–8.2)
RBC # BLD AUTO: 3.9 M/UL (ref 4.2–5.4)
SIGNIFICANT IND 70042: ABNORMAL
SITE SITE: ABNORMAL
SODIUM SERPL-SCNC: 137 MMOL/L (ref 135–145)
SOURCE SOURCE: ABNORMAL
WBC # BLD AUTO: 4.6 K/UL (ref 4.8–10.8)

## 2023-07-25 PROCEDURE — A9270 NON-COVERED ITEM OR SERVICE: HCPCS

## 2023-07-25 PROCEDURE — 700111 HCHG RX REV CODE 636 W/ 250 OVERRIDE (IP): Mod: JZ | Performed by: INTERNAL MEDICINE

## 2023-07-25 PROCEDURE — 700102 HCHG RX REV CODE 250 W/ 637 OVERRIDE(OP): Performed by: DENTIST

## 2023-07-25 PROCEDURE — 80053 COMPREHEN METABOLIC PANEL: CPT

## 2023-07-25 PROCEDURE — 85025 COMPLETE CBC W/AUTO DIFF WBC: CPT

## 2023-07-25 PROCEDURE — 700102 HCHG RX REV CODE 250 W/ 637 OVERRIDE(OP): Performed by: INTERNAL MEDICINE

## 2023-07-25 PROCEDURE — 87040 BLOOD CULTURE FOR BACTERIA: CPT

## 2023-07-25 PROCEDURE — 700111 HCHG RX REV CODE 636 W/ 250 OVERRIDE (IP): Performed by: INTERNAL MEDICINE

## 2023-07-25 PROCEDURE — A9270 NON-COVERED ITEM OR SERVICE: HCPCS | Performed by: DENTIST

## 2023-07-25 PROCEDURE — A9270 NON-COVERED ITEM OR SERVICE: HCPCS | Performed by: INTERNAL MEDICINE

## 2023-07-25 PROCEDURE — 700102 HCHG RX REV CODE 250 W/ 637 OVERRIDE(OP)

## 2023-07-25 PROCEDURE — 99254 IP/OBS CNSLTJ NEW/EST MOD 60: CPT | Performed by: INTERNAL MEDICINE

## 2023-07-25 PROCEDURE — 700105 HCHG RX REV CODE 258: Performed by: INTERNAL MEDICINE

## 2023-07-25 RX ORDER — POTASSIUM CHLORIDE 20 MEQ/1
20 TABLET, EXTENDED RELEASE ORAL 2 TIMES DAILY
Status: DISCONTINUED | OUTPATIENT
Start: 2023-07-25 | End: 2023-07-25 | Stop reason: HOSPADM

## 2023-07-25 RX ADMIN — NICOTINE TRANSDERMAL SYSTEM 21 MG: 21 PATCH, EXTENDED RELEASE TRANSDERMAL at 05:13

## 2023-07-25 RX ADMIN — POTASSIUM CHLORIDE 20 MEQ: 1500 TABLET, EXTENDED RELEASE ORAL at 11:20

## 2023-07-25 RX ADMIN — OXYCODONE HYDROCHLORIDE 10 MG: 10 TABLET ORAL at 09:07

## 2023-07-25 RX ADMIN — ENOXAPARIN SODIUM 40 MG: 100 INJECTION SUBCUTANEOUS at 00:18

## 2023-07-25 RX ADMIN — AMPICILLIN AND SULBACTAM 3 G: 1; 2 INJECTION, POWDER, FOR SOLUTION INTRAMUSCULAR; INTRAVENOUS at 11:36

## 2023-07-25 RX ADMIN — VANCOMYCIN HYDROCHLORIDE 1000 MG: 5 INJECTION, POWDER, LYOPHILIZED, FOR SOLUTION INTRAVENOUS at 06:04

## 2023-07-25 RX ADMIN — 0.12% CHLORHEXIDINE GLUCONATE 15 ML: 1.2 RINSE ORAL at 05:13

## 2023-07-25 RX ADMIN — OXYCODONE HYDROCHLORIDE 10 MG: 10 TABLET ORAL at 05:13

## 2023-07-25 RX ADMIN — AMPICILLIN AND SULBACTAM 3 G: 1; 2 INJECTION, POWDER, FOR SOLUTION INTRAMUSCULAR; INTRAVENOUS at 00:16

## 2023-07-25 RX ADMIN — AMPICILLIN AND SULBACTAM 3 G: 1; 2 INJECTION, POWDER, FOR SOLUTION INTRAMUSCULAR; INTRAVENOUS at 05:15

## 2023-07-25 ASSESSMENT — ENCOUNTER SYMPTOMS
FEVER: 0
SORE THROAT: 0
SENSORY CHANGE: 0
CHILLS: 0
DIARRHEA: 0
NECK PAIN: 0
COUGH: 0
WEAKNESS: 0
ABDOMINAL PAIN: 0
SINUS PAIN: 0
NAUSEA: 0
SHORTNESS OF BREATH: 0
HEADACHES: 0

## 2023-07-25 ASSESSMENT — PATIENT HEALTH QUESTIONNAIRE - PHQ9
1. LITTLE INTEREST OR PLEASURE IN DOING THINGS: NOT AT ALL
SUM OF ALL RESPONSES TO PHQ9 QUESTIONS 1 AND 2: 0
2. FEELING DOWN, DEPRESSED, IRRITABLE, OR HOPELESS: NOT AT ALL

## 2023-07-25 ASSESSMENT — PAIN DESCRIPTION - PAIN TYPE
TYPE: SURGICAL PAIN
TYPE: ACUTE PAIN;SURGICAL PAIN

## 2023-07-25 ASSESSMENT — FIBROSIS 4 INDEX: FIB4 SCORE: 1.166666666666666667

## 2023-07-25 NOTE — PROGRESS NOTES
Patient returned from surgery and is resting comfortably in room.  Guaze packing still in place, patient is A&Ox4, on room air, has 6/10 pain.

## 2023-07-25 NOTE — PROGRESS NOTES
[facility] Medicine Daily Progress Note    Date of Service  7/25/2023    Chief Complaint  Luna River is a 33 y.o. female admitted 7/23/2023 with facial swelling    Hospital Course  Luna River is a 33 y.o. female with past medical history of bipolar disorder, schizoaffective disorder, PTSD, hepatitis C, homelessness, alcohol use, IV opiates, who presented 7/23/2023 with complaints of swelling of the left lower face for over 1 months, dull and sharp pain worsening with mastication.    She was seen at [facility] ED 2 times on 7/17 when she was prescribed Augmentin but was not able to refill it and yesterday when she got I&D for gingival abscess, prescribed clindamycin but did not pick it up.  Due to worsening of pain and swelling she came to ED today.  CT of maxillofacial showed extensive dental disease with small bilateral maxillary tooth abscesses, worse on the left. She was started on Unasyn and Vancomycin and was admitted for antibiotics and oral surgery consult.     Dr. Jarrell with oral surgery was consulted. Patient requested extraction of all remaining teeth from him, which he performed along with I&D on 7/24. Her blood cultures did grow gram positive rods and cocci and infectious disease was consulted.     Interval Problem Update  7/25: Patient sore but pain controlled on current regimen. Tolerating PO intake. No fever / chills / hypotension. Discussed her possible bacteremia with Dr. Varela at patient bedside. Unclear if contaminant. Will DC vanc today, keep IV Unasyn. Repeating blood cultures, if repeat is negative she may be able to de-escalate to PO Augmentin. See ID notes for definitive recommendations. She is a very difficult stick, consulting vascular access for assistance - ok for midline if unable to obtain inadequate peripheral access.    I have discussed this patient's plan of care and discharge plan at IDT rounds today with Case Management, Nursing, Nursing leadership, and  other members of the IDT team.    Consultants/Specialty  infectious disease and oral surgery    Code Status  Full Code    Disposition  The patient is not medically cleared for discharge to home or a post-acute facility.  Anticipate discharge to: home with close outpatient follow-up    I have placed the appropriate orders for post-discharge needs.    Review of Systems  Review of Systems   Constitutional:  Negative for chills, fever and malaise/fatigue.   HENT:  Negative for sinus pain and sore throat.         Facial swelling, dental pain   Respiratory:  Negative for cough and shortness of breath.    Cardiovascular:  Negative for chest pain and leg swelling.   Gastrointestinal:  Negative for abdominal pain, diarrhea and nausea.   Genitourinary:  Negative for dysuria.   Musculoskeletal:  Negative for joint pain and neck pain.   Neurological:  Negative for sensory change, weakness and headaches.        Physical Exam  Temp:  [36.3 °C (97.3 °F)-36.8 °C (98.3 °F)] 36.5 °C (97.7 °F)  Pulse:  [65-89] 66  Resp:  [12-16] 16  BP: (106-144)/(58-89) 108/67  SpO2:  [93 %-100 %] 93 %    Physical Exam  Constitutional:       General: She is not in acute distress.     Appearance: She is not toxic-appearing.   HENT:      Head: Normocephalic.      Mouth/Throat:      Mouth: Mucous membranes are moist.      Dentition: Abnormal dentition (edentulous).      Pharynx: Oropharynx is clear. No oropharyngeal exudate.      Comments: Left lower facial tenderness  Eyes:      Conjunctiva/sclera: Conjunctivae normal.      Pupils: Pupils are equal, round, and reactive to light.   Cardiovascular:      Rate and Rhythm: Normal rate and regular rhythm.      Pulses: Normal pulses.      Heart sounds: Normal heart sounds. No murmur heard.  Pulmonary:      Effort: Pulmonary effort is normal.      Breath sounds: Normal breath sounds.   Abdominal:      General: Abdomen is flat. Bowel sounds are normal.      Palpations: Abdomen is soft.   Musculoskeletal:          General: Normal range of motion.      Cervical back: Normal range of motion and neck supple.   Skin:     General: Skin is warm and dry.      Capillary Refill: Capillary refill takes less than 2 seconds.      Comments: Many small scars on extremities   Neurological:      General: No focal deficit present.      Mental Status: She is alert and oriented to person, place, and time.   Psychiatric:         Mood and Affect: Mood normal.         Behavior: Behavior normal.         Fluids    Intake/Output Summary (Last 24 hours) at 7/25/2023 1104  Last data filed at 7/24/2023 2156  Gross per 24 hour   Intake 320 ml   Output 10 ml   Net 310 ml       Laboratory  Recent Labs     07/23/23 2244 07/25/23  0349   WBC 7.5 4.6*   RBC 4.15* 3.90*   HEMOGLOBIN 11.9* 11.4*   HEMATOCRIT 36.1* 34.1*   MCV 87.0 87.4   MCH 28.7 29.2   MCHC 33.0 33.4   RDW 41.7 41.7   PLATELETCT 236 186   MPV 9.4 9.9     Recent Labs     07/23/23 2244 07/25/23  0349   SODIUM 140 137   POTASSIUM 3.2* 3.5*   CHLORIDE 103 102   CO2 26 24   GLUCOSE 91 178*   BUN 18 9   CREATININE 0.66 0.41*   CALCIUM 9.4 8.8       Imaging  IR-US GUIDED PIV   Final Result    Ultrasound-guided PERIPHERAL IV INSERTION performed by    qualified nursing staff as above.      CT-MAXILLOFACIAL WITH PLUS RECONS   Final Result      1.  Extensive dental disease with probable small bilateral maxillary tooth abscesses, worse on the LEFT.   2.  No discrete soft tissue abscess however inflammatory changes overlie the mandible and maxilla anteriorly, worse on the LEFT.      [id number]   Final Result      Numerous caries      IR-PICC LINE PLACEMENT W/ GUIDANCE > AGE 5    (Results Pending)        Assessment/Plan  * Facial cellulitis, odontogenic- (present on admission)  Assessment & Plan  CT of the maxillofacial area noted.  Currently does not appear septic.  S/p extraction of all teeth and I&D by Dr. Jarrell on 7/24  Continue IV antibiotics for now  Pain management with Tylenol,  oxycodone and morphine as needed  Now edentulous - advance to soft diet as tolerated.    Gram-positive bacteremia- (present on admission)  Assessment & Plan  7/24: 1 out of 2 bottles with Lysinibacillus boronitolerans and Viridans Streptococcus  Unclear if contaminant, she does have multiple risk factors for a true bacteremia  Dr. Varela with ID following, discussed at bedside.  Will repeat blood cultures and follow  Continue IV antibiotics for now.   DC vancomycin.    Difficult stick, OK for midline if PIV access inadequate.     Hypokalemia  Assessment & Plan  P.o. replacement  Recheck in AM    Tobacco abuse- (present on admission)  Assessment & Plan  Provided 3 mins on Tobacco cessation education .  Patch and gum provided  Discussed outpatient options       VTE prophylaxis: SCDs/TEDs and enoxaparin ppx    I have performed a physical exam and reviewed and updated ROS and Plan today (7/25/2023). In review of yesterday's note (7/24/2023), there are no changes except as documented above.    Time spent with patient, 20 minutes: 10 minutes assessment and history, 10 minutes education and counseling.

## 2023-07-25 NOTE — OR NURSING
2255  Transferred to room via bed  Patient on room air  Pertinent post op orders reviewed with receiving RN

## 2023-07-25 NOTE — ANESTHESIA PREPROCEDURE EVALUATION
Case: [id number] Date/Time: 07/24/23 2101    Procedure: INCISION AND DRAINAGE, ABSCESS, SUBMANDIBULAR REGION    Pre-op diagnosis: Facial abscess 2/2 odontogenic infection    Location: [facility] OR 14 / SURGERY [facility]    Surgeons: Truong Jarrell D.D.S.          Relevant Problems   No relevant active problems       Physical Exam    Airway   Mallampati: II  TM distance: >3 FB  Neck ROM: full       Cardiovascular - normal exam  Rhythm: regular  Rate: normal  (-) murmur     Dental - normal exam           Pulmonary - normal exam  Breath sounds clear to auscultation     Abdominal    Neurological - normal exam                 Anesthesia Plan    ASA 3 (chart)       Plan - general       Airway plan will be ETT          Induction: intravenous    Postoperative Plan: Postoperative administration of opioids is intended.    Pertinent diagnostic labs and testing reviewed    Informed Consent:    Anesthetic plan and risks discussed with patient.    Use of blood products discussed with: patient whom consented to blood products.

## 2023-07-25 NOTE — CONSULTS
[facility] INFECTIOUS DISEASES INPATIENT CONSULT NOTE     Date of Service: 7/25/2023    Consult Requested By: TRICIA Levi    Reason for Consultation: Mandibular abscess    Chief Complaint: Tooth infection    History of Present Illness:     Luna River is a 33 y.o. female admitted 7/23/2023.  Extensive review of documentation from multiple specialties performed in generating this HPI.  Patient with ongoing methamphetamine use and IV opioid use, bipolar disorder, schizoaffective disorder, PTSD, hepatitis C antibody positive, homelessness, alcohol use, presented 7/23 with swelling left lower face for about a month, worsening pain with chewing.  Patient was seen at [place] [facility] twice this month was prescribed Augmentin but could not fill.  Per report she underwent I&D of gingival abscess on 7/23, did not  clindamycin.  She had worsening pain thus presented to [facility] ER.  CT scan was concerning for bilateral maxillary tooth abscesses worse on the left without soft tissue abscess.  Patient is afebrile, white count 7.5, was started on vancomycin and Unasyn.  1 out of 2 blood cultures turned positive for gram-positive latisha and a viridans Streptococcus.  Creatinine 0.41, normal transaminases, albumin 3.4.  Oral surgery was consulted and underwent extraction of multiple teeth on 7/24.    Review of Systems:  All other systems reviewed and are negative expect as noted in HPI    Past Medical History:   Diagnosis Date    Anorexia     Bipolar 2 disorder (HCC)     bulemia     Hepatitis C     Hepatitis C     Impetigo     Liver disease     Major depressive disorder     Mood disorder (HCC)     MRSA infection     Oppositional defiant disorder     Polysubstance abuse (HCC)     Post-traumatic stress disorder     Schizoaffective disorder     Scoliosis        Past Surgical History:   Procedure Laterality Date    IRRIGATION & DEBRIDEMENT GENERAL Bilateral 6/8/2022    Procedure: IRRIGATION AND DEBRIDEMENT, MULTIPLE  WOUND BILATERAL LOWER EXTREMITIES;  Surgeon: Dakota Cortes M.D.;  Location: SURGERY [facility];  Service: Orthopedics    IRRIGATION & DEBRIDEMENT GENERAL Right 2/23/2022    Procedure: IRRIGATION AND DEBRIDEMENT, WOUND- RIGHT THIGH AND BUTTOCK;  Surgeon: Dakota Cortes M.D.;  Location: SURGERY [facility];  Service: Orthopedics    YONATAN BY LAPAROSCOPY  2/1/2013    Performed by Yves Rooney M.D. at SURGERY [facility] ORS    OTHER      dental    NV REMOVAL OF TONSILS,<13 Y/O      TONSILLECTOMY         History reviewed. No pertinent family history.    Social History     Socioeconomic History    Marital status: Single     Spouse name: Not on file    Number of children: Not on file    Years of education: Not on file    Highest education level: Not on file   Occupational History    Not on file   Tobacco Use    Smoking status: Some Days     Packs/day: 0.25     Years: 7.00     Pack years: 1.75     Types: Cigarettes    Smokeless tobacco: Never   Vaping Use    Vaping Use: Every day    Substances: Nicotine   Substance and Sexual Activity    Alcohol use: Not Currently     Comment: rarely    Drug use: Not Currently     Types: Intravenous, Injected (Skin Popping)     Comment: Heroin, fentanyl, meth    Sexual activity: Not on file   Other Topics Concern    Not on file   Social History Narrative    Not on file     Social Determinants of Health     Financial Resource Strain: Not on file   Food Insecurity: Not on file   Transportation Needs: Not on file   Physical Activity: Not on file   Stress: Not on file   Social Connections: Not on file   Intimate Partner Violence: Not on file   Housing Stability: Not on file       Allergies   Allergen Reactions    Clonazepam Unspecified     Pt states that she overdosed on it before. Furthermore, per historical, pt had seizure-like activity while taking Clonazepam as a child.    Gabapentin Unspecified     Causes seizures.    Gabadone Unspecified     Unknown reaction.    Tiagabine  Hydrochloride Unspecified     Unknown reaction.       Medications:    Current Facility-Administered Medications:     senna-docusate (Pericolace Or Senokot S) 8.6-50 MG per tablet 2 Tablet, 2 Tablet, Oral, BID **AND** polyethylene glycol/lytes (Miralax) PACKET 1 Packet, 1 Packet, Oral, QDAY PRN **AND** magnesium hydroxide (Milk Of Magnesia) suspension 30 mL, 30 mL, Oral, QDAY PRN **AND** bisacodyl (Dulcolax) suppository 10 mg, 10 mg, Rectal, QDAY PRN, Jamey Rivas M.D.    enoxaparin (Lovenox) inj 40 mg, 40 mg, Subcutaneous, DAILY AT 1800, Jamey Rivas M.D., 40 mg at 07/25/23 0018    acetaminophen (Tylenol) tablet 650 mg, 650 mg, Oral, Q6HRS PRN, Jamey Rivas M.D.    Notify provider if pain remains uncontrolled, , , CONTINUOUS **AND** Use the Numeric Rating Scale (NRS), Tellez-Baker Faces (WBF), or FLACC on regular floors and Critical-Care Pain Observation Tool (CPOT) on ICUs/Trauma to assess pain, , , CONTINUOUS **AND** Pulse Ox, , , CONTINUOUS **AND** Pharmacy Consult Request ...Pain Management Review 1 Each, 1 Each, Other, PHARMACY TO DOSE **AND** If patient difficult to arouse and/or has respiratory depression (respiratory rate of 10 or less), stop any opiates that are currently infusing and call a Rapid Response., , , CONTINUOUS, Jamey Rivas M.D.    oxyCODONE immediate-release (Roxicodone) tablet 5 mg, 5 mg, Oral, Q3HRS PRN, 5 mg at 07/24/23 0602 **OR** oxyCODONE immediate release (Roxicodone) tablet 10 mg, 10 mg, Oral, Q3HRS PRN, 10 mg at 07/25/23 0513 **OR** morphine 4 MG/ML injection 4 mg, 4 mg, Intravenous, Q3HRS PRN, Jamey Rivas M.D.    ondansetron (Zofran) syringe/vial injection 4 mg, 4 mg, Intravenous, Q4HRS PRN, Jamey Rivas M.D.    ondansetron (Zofran ODT) dispertab 4 mg, 4 mg, Oral, Q4HRS PRN, Jamey Rivas M.D.    promethazine (Phenergan) tablet 12.5-25 mg, 12.5-25 mg, Oral, Q4HRS PRN, Jamey Rivas M.D.    promethazine (Phenergan) suppository 12.5-25 mg, 12.5-25  "mg, Rectal, Q4HRS PRN, Jamey Rivas M.D.    prochlorperazine (Compazine) injection 5-10 mg, 5-10 mg, Intravenous, Q4HRS PRN, Jamey Rivas M.D.    labetalol (Normodyne/Trandate) injection 10 mg, 10 mg, Intravenous, Q4HRS PRN, Jamey Rivas M.D.    nicotine (Nicoderm) 21 MG/24HR 21 mg, 21 mg, Transdermal, Daily-0600, 21 mg at 23 0513 **AND** Nicotine Replacement Patient Education Materials, , , Once **AND** nicotine polacrilex (Nicorette) 2 MG piece 2 mg, 2 mg, Oral, Q HOUR PRN, Jamey Rivas M.D.    ampicillin/sulbactam (Unasyn) 3 g in  mL IVPB, 3 g, Intravenous, Q6HRS, Rey Salgado M.D., Stopped at 23 0545    MD Alert...Vancomycin per Pharmacy, , Other, PHARMACY TO DOSE, Rey Salgado M.D.    vancomycin (Vancocin) 1,000 mg in  mL IVPB, 1,000 mg, Intravenous, Q12HR, Rey Salgado M.D., Stopped at 23 0804    chlorhexidine (Peridex) 0.12 % solution 15 mL, 15 mL, Mouth/Throat, BID, Truong Jarrell D.D.S., 15 mL at 23 0513    Physical Exam:   Vital Signs: /67   Pulse 66   Temp 36.5 °C (97.7 °F) (Temporal)   Resp 16   Ht 1.753 m (5' 9\")   Wt 62.6 kg (138 lb 0.1 oz)   SpO2 93%   BMI 20.38 kg/m²   Temp  Av.6 °C (97.9 °F)  Min: 36.3 °C (97.3 °F)  Max: 36.9 °C (98.5 °F)  Vital signs reviewed  Constitutional: Patient is oriented to person, place, and time. Appears but in no acute distress  Head: Atraumatic, normocephalic  Eyes: Conjunctivae normal, EOM intact. Pupils are equal, round, and reactive to light.   Mouth/Throat: Lips without lesions, multiple teeth removed  Neck: Neck supple. No masses/lymphadenopathy  Cardiovascular: Normal rate, regular rhythm. No pedal edema.  Pulmonary/Chest: No respiratory distress. Unlabored respiratory effort, Abdominal: Soft, non tender. BS + x 4. No masses or hepatosplenomegaly.   Musculoskeletal: No joint tenderness, swelling, erythema, or restriction of motion noted.  Neurological: Alert and oriented to person, place, and " time. No gross cranial nerve deficit. No focal neural deficit noted  Skin: Multiple healed scabs lower extremities  Psychiatric: Normal mood and affect. Behavior is normal.     LABS:  Recent Labs     07/23/23 2244 07/25/23  0349   WBC 7.5 4.6*      Recent Labs     07/23/23 2244 07/25/23  0349   HEMOGLOBIN 11.9* 11.4*   HEMATOCRIT 36.1* 34.1*   MCV 87.0 87.4   MCH 28.7 29.2   PLATELETCT 236 186       Recent Labs     07/23/23 2244 07/25/23  0349   SODIUM 140 137   POTASSIUM 3.2* 3.5*   CHLORIDE 103 102   CO2 26 24   CREATININE 0.66 0.41*        Recent Labs     07/23/23 2244 07/25/23 0349   ALBUMIN 4.3 3.4        MICRO:  Blood Culture   Date Value Ref Range Status   11/27/2018 No growth after 5 days of incubation.  Final        Latest pertinent labs were reviewed    IMAGING STUDIES:  As above    Hospital Course/Assessment:   Luna River is a pleasant 33 y.o. female patient with ongoing methamphetamine use and IV opioid use, bipolar and schizoaffective disorder, PTSD, hepatitis C antibody positive, homelessness, alcohol use, admitted with worsening dental decay of multiple teeth and tooth abscesses, status post extraction of multiple teeth on 7/24.    1 out of 2 blood cultures are positive for 2 organisms - Lysinibacillus (an environmental gram-positive latisha), and viridans Streptococcus.  The other set is negative.  This likely represents contamination.  Patient is quite a difficult stick which raises the possibility of contaminated blood culture.  Patient does however use IV opioids that she would mix with water which can result in a true bacteremia with environmental organisms.  Recommend repeating blood cultures x2 to ensure they are negative as well.    Pertinent Diagnoses:  Dental decay  Tooth abscesses  Positive blood culture  Methamphetamine use  Hepatitis C antibody positive, negative PCR May 2022  Homelessness  Alcohol use  Bipolar and schizoaffective disorder  PTSD    Plan:   -Repeat blood cultures  x2  -Stop vancomycin, okay to continue IV Unasyn for now  -On discharge, recommend 2 weeks of p.o. Augmentin 875 mg twice daily  -Please call us back if repeat blood cultures turn positive or if any worsening    Plan was discussed with the primary team, YAEL Terrell and Dr. Zacarias HIGGINBOTHAM will sign off.  Please call with questions.    Onel Varela M.D.    Please note that this dictation was created using voice recognition software. I have worked with technical experts from [facility] to optimize the interface.  I have made every reasonable attempt to correct obvious errors, but there may be errors of grammar and possibly content that I did not discover before finalizing the note.

## 2023-07-25 NOTE — ASSESSMENT & PLAN NOTE
7/24: 1 out of 2 bottles with Lysinibacillus boronitolerans and Viridans Streptococcus  Unclear if contaminant, she does have multiple risk factors for a true bacteremia  Dr. Varela with ID following, discussed at bedside.  Will repeat blood cultures and follow  Continue IV antibiotics for now.   DC vancomycin.    Difficult stick, OK for midline if PIV access inadequate.

## 2023-07-25 NOTE — PROGRESS NOTES
Checked on patient. Noted gown on bed. All personal belongings gone. No sign of PIVs being removed. Assumed left AMA with IVs intact. MD and charge RN aware.

## 2023-07-25 NOTE — ANESTHESIA TIME REPORT
Anesthesia Start and Stop Event Times     Date Time Event    7/24/2023 2010 Ready for Procedure     2025 Anesthesia Start     2116 Anesthesia Stop        Responsible Staff  07/24/23    Name Role Begin End    Dani Helm M.D. Anesth 2025 2116        Overtime Reason:  no overtime (within assigned shift)    Comments:

## 2023-07-25 NOTE — OR NURSING
Per MD Salgado pt okay to return to CDU for the night and can transfer to S1 when room becomes available.

## 2023-07-25 NOTE — HOSPITAL COURSE
Luna River is a 33 y.o. female with past medical history of bipolar disorder, schizoaffective disorder, PTSD, hepatitis C, homelessness, alcohol use, IV opiates, who presented 7/23/2023 with complaints of swelling of the left lower face for over 1 months, dull and sharp pain worsening with mastication.    She was seen at [facility] ED 2 times on 7/17 when she was prescribed Augmentin but was not able to refill it and yesterday when she got I&D for gingival abscess, prescribed clindamycin but did not pick it up.  Due to worsening of pain and swelling she came to ED today.  CT of maxillofacial showed extensive dental disease with small bilateral maxillary tooth abscesses, worse on the left. She was started on Unasyn and Vancomycin and was admitted for antibiotics and oral surgery consult.     Dr. Jarrell with oral surgery was consulted. Patient requested extraction of all remaining teeth from him, which he performed along with I&D on 7/24. Her blood cultures did grow gram positive rods and cocci and infectious disease was consulted.     On 7/25 she tolerated PO intake, remained without fever, chills, or hypotension. Blood culture organisms identified as Lysinibacillus and viridans Streptococcus. Discussed with Dr. Varela in infectious disease who recommended to DC vancomycin, continue Unasyn, repeat blood cultures, and possibly de-escalate to 2 weeks Augmentin if repeat cultures negative.     Repeat blood cultures were drawn and plan of care was discussed with the patient. However around 3:30pm patient was noted by her bedside nurse to have eloped against medical advice without notifying staff.

## 2023-07-25 NOTE — OP REPORT
Operative Report  Oral & Facial Surgery  Dental Extractions    Pt Name:  Luna River     Date of Surgery: 7/24/2023     Surgeon:  Truong Jarrell DDS    Assistant: None    Pre-Op Diagnosis:     Dental Decay on Teeth #s 2,3,4,5,6,7,8,9,10,11,12,13,14,15,16,17,18,20 21 22,23,24,25,26,27,28,29,30,31              Oral Abscess    Post Op Diagnosis:       Same    Operation Performed:           Srugical Extraction of teeth # 2,3,4,5,6,7,8,9,10,11,12,13,14,15,16,17,18,20 21 22,23,24,25,26,27,28,29,30,31   Alveoloplasty (4 quadrants)   Complicated Intraoral I&D    Description of Surgery    The pt was brought to the operating room by the anesthesia team.  A time out was performed and consents were verified.  The pt was then uneventfully induced into general anesthesia.  A endotracheal tube was inserted and secured by the anesthesia team.  An oropharyngeal throat pack was placed.  approximately 15 mL of 0.5% buprivicain with 1:100K epi was administered to the proposed surgical sites.     A 15 blade was used to make an incision on the mandibular left vestibule in the area of #21.  Blunt dissection was then carried out to the bone and purulence was encountered.  Copious irrigation was placed in the wound until clear return was encountered.      A 15 blade was used to make a sulcular incision along the sites to be extracted.  A full thickness mucoperiosteal flap was elevated.  Teeth #s 2,3,4,5,6,7,8,9,10,11,12,13,14,15,16,17,18,20 21 22,23,24,25,26,27,28,29,30,31 were removed with appropriate bone removal and sectioning with forceps and elevators.  Rongeurs and bone files were used to smooth the alveolus of the bone.  Copious irrigation was placed in the surgical sites.      The throat pack was then removed and hemostasis achieved.  The pt was then turned back over to the anesthesia team, was awakened and extubated uneventfully and taken to the PACU in stable condition.      Estimated Blood Loss:  50 mL  Needle and sponge  count:  Correct  Specimens Removed:  Teeth #s 2,3,4,5,6,7,8,9,10,11,12,13,14,15,16,17,18,20 21 22,23,24,25,26,27,28,29,30,31    Truong Jarrell DDS
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
## 2023-07-25 NOTE — CONSULTS
"Oral & Facial Surgery   Consultation Note    ID:  Luna Cleojovan River     HPI:  Pt with mult day hx of failed po abx and facial swelling and tooth pain.  Repeat visit to ER.      Chief Complaint:  tooth pain    Exam:   Mouth:  quang  25 Mm, all remaining teeth with gross decay and fractreus.   Very poor dentation.  Left lateral mandible with STS and pian present.  Tenderness to palpation on teeth as well.  Tissue locally erythematous, induration present  Throat:  OP Clear    Imaging:   Maxillofacial CT:  Abscess located at apices of teeth  Pano:  gross decay and fractures on all remainin gteeth.     Assessment:  Facial abscess 2/2 odontogenic infeiton    Discussed R/B/A with pt regarding extractions with I&D.  Discussed risks such as pain, infection, bleeding, swelling, need for additional surgery, numbness, muscle weakness, nerve damage, as well as damage to adjacent structures.  Discussed possibility of penrose drain placement and need for good oral hygiene and cleaning/flushing of the drain.  Emphasized follow up.  Informed pt that all remaiing teeth with gross decay and area in poor condition.  She asked to have all teeth removed. Inforemd her that she will nto have a denture made by me and will be responsibe to find a dentist on her own who will alas a denture for her.  She said she will \"figure it out and \" she \"just wants teeth out.\".  Again, told her that she will not have teeth when she leave the hosptial, she veralieed undersnating and agreennt.  All questions answered, and pt verbalized understanding and agreement.  Written consents obtained.      Plan:    To OR for I&D with extraction of all remaining teeth    Thank You,  Truong Jarrell, DDS  [phone]    "

## 2023-07-25 NOTE — OR NURSING
Patient tolerating fluids well; no complaints of nausea  Gauze in mouth changed X 3; small amount of drainage on each ghost each time changed  Medicated for pain with partial relief; states tolerable for transfer to room     Negative

## 2023-07-25 NOTE — PROGRESS NOTES
Pt tolerated extractions and I&D well. Excellent prognosis for recovery . When pt is medically stable, OK to dc from OMFS standpoint.  Pt can f/u with my office in 1 week . Recommend dc with Augmentin, pain meds and peridex.      Thank you,  Truong Jarrell, DDS  [phone]

## 2023-07-25 NOTE — ANESTHESIA PROCEDURE NOTES
Airway    Date/Time: 7/24/2023 8:38 PM    Performed by: Dani Helm M.D.  Authorized by: Dani Helm M.D.    Location:  OR  Urgency:  Elective  Difficult Airway: No    Indications for Airway Management:  Anesthesia      Spontaneous Ventilation: absent    Sedation Level:  Deep  Preoxygenated: Yes    Patient Position:  Sniffing  Mask Difficulty Assessment:  1 - vent by mask  Final Airway Type:  Endotracheal airway  Final Endotracheal Airway:  ETT  Cuffed: Yes    Technique Used for Successful ETT Placement:  Direct laryngoscopy  Devices/Methods Used in Placement:  Magill forceps and NPA    Insertion Site:  Right naris  Blade Type:  Delano  Laryngoscope Blade/Videolaryngoscope Blade Size:  3  ETT Size (mm):  7.0  Measured from:  Nares  ETT to Nares (cm):  23  Placement Verified by: auscultation and capnometry    Cormack-Lehane Classification:  Grade IIa - partial view of glottis  Number of Attempts at Approach:  1  Number of Other Approaches Attempted:  0

## 2023-07-25 NOTE — PROGRESS NOTES
Assumed care. A/O. Pain 6/10 to mouth otherwise doing ok. No active bleeding/drainage. States is hungry, taking full liquids.

## 2023-07-25 NOTE — ANESTHESIA POSTPROCEDURE EVALUATION
Patient: Luna River    Procedure Summary     Date: 07/24/23 Room / Location: [place] [address] / SURGERY [facility]    Anesthesia Start: 2025 Anesthesia Stop: 2116    Procedure: INCISION AND DRAINAGE, ABSCESS, SUBMANDIBULAR REGION (Mouth) Diagnosis: (Facial abscess 2/2 odontogenic infection)    Surgeons: Truong Jarrell D.D.S. Responsible Provider: Dani Helm M.D.    Anesthesia Type: general ASA Status: 3          Final Anesthesia Type: general  Last vitals  BP   Blood Pressure: (!) 144/89    Temp   36.5 °C (97.7 °F)    Pulse   77   Resp   14    SpO2   100 %      Anesthesia Post Evaluation    Patient location during evaluation: PACU  Patient participation: complete - patient participated  Level of consciousness: awake and alert  Pain score: 3    Airway patency: patent  Anesthetic complications: no  Cardiovascular status: hemodynamically stable  Respiratory status: acceptable  Hydration status: euvolemic    PONV: none          No notable events documented.     Nurse Pain Score: 9 (NPRS)

## 2023-07-25 NOTE — CARE PLAN
The patient is Stable - Low risk of patient condition declining or worsening    Shift Goals  Clinical Goals: pain control, rest, IV ABX  Patient Goals: comfort, sleep, eat    Progress made toward(s) clinical / shift goals:  Pain management progressing through medication (see MAR), rest, positioning, and distraction.  Patient educated on plan of care, medications, side effects, non-pharmalogical pain control, and safety/fall precautions.        Problem: Knowledge Deficit - Standard  Goal: Patient and family/care givers will demonstrate understanding of plan of care, disease process/condition, diagnostic tests and medications  Description: Target End Date:  1-3 days or as soon as patient condition allows    Document in Patient Education    1.  Patient and family/caregiver oriented to unit, equipment, visitation policy and means for communicating concern  2.  Complete/review Learning Assessment  3.  Assess knowledge level of disease process/condition, treatment plan, diagnostic tests and medications  4.  Explain disease process/condition, treatment plan, diagnostic tests and medications  Outcome: Progressing     Problem: Pain - Standard  Goal: Alleviation of pain or a reduction in pain to the patient’s comfort goal  Description: Target End Date:  Prior to discharge or change in level of care    Document on Vitals flowsheet    1.  Document pain using the appropriate pain scale per order or unit policy  2.  Educate and implement non-pharmacologic comfort measures (i.e. relaxation, distraction, massage, cold/heat therapy, etc.)  3.  Pain management medications as ordered  4.  Reassess pain after pain med administration per policy  5.  If opiods administered assess patient's response to pain medication is appropriate per POSS sedation scale  6.  Follow pain management plan developed in collaboration with patient and interdisciplinary team (including palliative care or pain specialists if applicable)  Outcome: Progressing

## 2023-07-26 NOTE — DISCHARGE SUMMARY
Discharge Summary    CHIEF COMPLAINT ON ADMISSION  Chief Complaint   Patient presents with    Mouth Swelling     The pt reports hx of broken jaw and lower mouth swelling for the last several months. The pt reports going to [facility] to get abscess drained last night and the swelling increased. The pt reports 6/10 mouth pain. The pt protecting airway, no excessive drooling. The pt speaking in full complete sentences.    Tooth Abscess       Reason for Admission  Oral Pain     Admission Date  7/23/2023    CODE STATUS  Full Code    HPI & HOSPITAL COURSE  Luna River is a 33 y.o. female with past medical history of bipolar disorder, schizoaffective disorder, PTSD, hepatitis C, homelessness, alcohol use, IV opiates, who presented 7/23/2023 with complaints of swelling of the left lower face for over 1 months, dull and sharp pain worsening with mastication.    She was seen at [facility] ED 2 times on 7/17 when she was prescribed Augmentin but was not able to refill it and yesterday when she got I&D for gingival abscess, prescribed clindamycin but did not pick it up.  Due to worsening of pain and swelling she came to ED today.  CT of maxillofacial showed extensive dental disease with small bilateral maxillary tooth abscesses, worse on the left. She was started on Unasyn and Vancomycin and was admitted for antibiotics and oral surgery consult.     Dr. Jarrell with oral surgery was consulted. Patient requested extraction of all remaining teeth from him, which he performed along with I&D on 7/24. Her blood cultures did grow gram positive rods and cocci and infectious disease was consulted.     On 7/25 she tolerated PO intake, remained without fever, chills, or hypotension. Blood culture organisms identified as Lysinibacillus and viridans Streptococcus. Discussed with Dr. Varela in infectious disease who recommended to DC vancomycin, continue Unasyn, repeat blood cultures, and possibly de-escalate to 2 weeks  Augmentin if repeat cultures negative.     Repeat blood cultures were drawn and plan of care was discussed with the patient. However around 3:30pm patient was noted by her bedside nurse to have eloped against medical advice without notifying staff.     Attempted without success to contact patient at provided numbers to discuss outpatient antibiotic options.    Therefore, she is discharged in fair and stable condition against medcial advice.    Discharge Date  7/25/2023    FOLLOW UP ITEMS POST DISCHARGE  Follow with a PCP or other physician as soon as possible    DISCHARGE DIAGNOSES  Principal Problem:    Facial cellulitis, odontogenic (POA: Yes)  Active Problems:    Tobacco abuse (POA: Yes)    Hypokalemia (POA: Unknown)    Gram-positive bacteremia (POA: Yes)  Resolved Problems:    * No resolved hospital problems. *      FOLLOW UP  PCP asap    MEDICATIONS ON DISCHARGE     Medication List      You have not been prescribed any medications.         Allergies  Allergies   Allergen Reactions    Clonazepam Unspecified     Pt states that she overdosed on it before. Furthermore, per historical, pt had seizure-like activity while taking Clonazepam as a child.    Gabapentin Unspecified     Causes seizures.    Gabadone Unspecified     Unknown reaction.    Tiagabine Hydrochloride Unspecified     Unknown reaction.       DIET  Orders Placed This Encounter   Procedures    Diet Order Diet: Full Liquid     Standing Status:   Standing     Number of Occurrences:   1     Order Specific Question:   Diet:     Answer:   Full Liquid [11]       ACTIVITY  As tolerated.  Weight bearing as tolerated    CONSULTATIONS  Infectious disease  Oral surgery    PROCEDURES  7/24 with Dr. Jarrell-  Surgical Extraction of teeth # 2,3,4,5,6,7,8,9,10,11,12,13,14,15,16,17,18,20 21 22,23,24,25,26,27,28,29,30,31              Alveoloplasty (4 quadrants)              Complicated Intraoral I&D    LABORATORY  Lab Results   Component Value Date    SODIUM 137  07/25/2023    POTASSIUM 3.5 (L) 07/25/2023    CHLORIDE 102 07/25/2023    CO2 24 07/25/2023    GLUCOSE 178 (H) 07/25/2023    BUN 9 07/25/2023    CREATININE 0.41 (L) 07/25/2023    CREATININE 0.6 05/29/2008        Lab Results   Component Value Date    WBC 4.6 (L) 07/25/2023    HEMOGLOBIN 11.4 (L) 07/25/2023    HEMATOCRIT 34.1 (L) 07/25/2023    PLATELETCT 186 07/25/2023        Total time of the discharge process exceeds 25 minutes.

## 2023-07-29 LAB
BACTERIA BLD CULT: NORMAL
SIGNIFICANT IND 70042: NORMAL
SITE SITE: NORMAL
SOURCE SOURCE: NORMAL

## 2024-05-30 ENCOUNTER — HOSPITAL ENCOUNTER (EMERGENCY)
Facility: MEDICAL CENTER | Age: 34
End: 2024-05-30
Attending: EMERGENCY MEDICINE
Payer: COMMERCIAL

## 2024-05-30 ENCOUNTER — PHARMACY VISIT (OUTPATIENT)
Dept: PHARMACY | Facility: MEDICAL CENTER | Age: 34
End: 2024-05-30
Payer: MEDICARE

## 2024-05-30 VITALS
HEART RATE: 72 BPM | BODY MASS INDEX: 21.26 KG/M2 | OXYGEN SATURATION: 96 % | SYSTOLIC BLOOD PRESSURE: 114 MMHG | TEMPERATURE: 97 F | DIASTOLIC BLOOD PRESSURE: 75 MMHG | RESPIRATION RATE: 16 BRPM | HEIGHT: 69 IN | WEIGHT: 143.52 LBS

## 2024-05-30 DIAGNOSIS — A64 STI (SEXUALLY TRANSMITTED INFECTION): ICD-10-CM

## 2024-05-30 DIAGNOSIS — A74.9 CHLAMYDIA INFECTION: ICD-10-CM

## 2024-05-30 DIAGNOSIS — A53.9 SYPHILIS: ICD-10-CM

## 2024-05-30 LAB
APPEARANCE UR: CLEAR
BACTERIA #/AREA URNS HPF: ABNORMAL /HPF
BILIRUB UR QL STRIP.AUTO: NEGATIVE
COLOR UR: ABNORMAL
EPI CELLS #/AREA URNS HPF: ABNORMAL /HPF
GLUCOSE UR STRIP.AUTO-MCNC: NEGATIVE MG/DL
HIV 1+2 AB+HIV1 P24 AG SERPL QL IA: NORMAL
HYALINE CASTS #/AREA URNS LPF: ABNORMAL /LPF
KETONES UR STRIP.AUTO-MCNC: ABNORMAL MG/DL
LEUKOCYTE ESTERASE UR QL STRIP.AUTO: NEGATIVE
MICRO URNS: ABNORMAL
MUCOUS THREADS #/AREA URNS HPF: ABNORMAL /HPF
NITRITE UR QL STRIP.AUTO: NEGATIVE
PH UR STRIP.AUTO: 5.5 [PH] (ref 5–8)
PROT UR QL STRIP: 30 MG/DL
RBC # URNS HPF: ABNORMAL /HPF
RBC UR QL AUTO: ABNORMAL
SP GR UR STRIP.AUTO: 1.03
T PALLIDUM AB SER QL IA: REACTIVE
UROBILINOGEN UR STRIP.AUTO-MCNC: 1 MG/DL
WBC #/AREA URNS HPF: ABNORMAL /HPF

## 2024-05-30 PROCEDURE — A9270 NON-COVERED ITEM OR SERVICE: HCPCS | Mod: UD | Performed by: EMERGENCY MEDICINE

## 2024-05-30 PROCEDURE — 96372 THER/PROPH/DIAG INJ SC/IM: CPT

## 2024-05-30 PROCEDURE — RXMED WILLOW AMBULATORY MEDICATION CHARGE: Performed by: EMERGENCY MEDICINE

## 2024-05-30 PROCEDURE — 86780 TREPONEMA PALLIDUM: CPT

## 2024-05-30 PROCEDURE — 86592 SYPHILIS TEST NON-TREP QUAL: CPT

## 2024-05-30 PROCEDURE — 99284 EMERGENCY DEPT VISIT MOD MDM: CPT

## 2024-05-30 PROCEDURE — 700102 HCHG RX REV CODE 250 W/ 637 OVERRIDE(OP): Mod: UD | Performed by: EMERGENCY MEDICINE

## 2024-05-30 PROCEDURE — 87591 N.GONORRHOEAE DNA AMP PROB: CPT

## 2024-05-30 PROCEDURE — 700101 HCHG RX REV CODE 250: Mod: UD | Performed by: EMERGENCY MEDICINE

## 2024-05-30 PROCEDURE — 87491 CHLMYD TRACH DNA AMP PROBE: CPT

## 2024-05-30 PROCEDURE — 81001 URINALYSIS AUTO W/SCOPE: CPT

## 2024-05-30 PROCEDURE — 87389 HIV-1 AG W/HIV-1&-2 AB AG IA: CPT

## 2024-05-30 PROCEDURE — 36415 COLL VENOUS BLD VENIPUNCTURE: CPT

## 2024-05-30 PROCEDURE — 700111 HCHG RX REV CODE 636 W/ 250 OVERRIDE (IP): Mod: JZ,UD | Performed by: EMERGENCY MEDICINE

## 2024-05-30 RX ORDER — DOXYCYCLINE 100 MG/1
100 TABLET ORAL ONCE
Status: COMPLETED | OUTPATIENT
Start: 2024-05-30 | End: 2024-05-30

## 2024-05-30 RX ORDER — DOXYCYCLINE 100 MG/1
100 CAPSULE ORAL 2 TIMES DAILY
Qty: 20 CAPSULE | Refills: 0 | Status: ACTIVE | OUTPATIENT
Start: 2024-05-30

## 2024-05-30 RX ORDER — CEFTRIAXONE 500 MG/1
500 INJECTION, POWDER, FOR SOLUTION INTRAMUSCULAR; INTRAVENOUS ONCE
Status: COMPLETED | OUTPATIENT
Start: 2024-05-30 | End: 2024-05-30

## 2024-05-30 RX ADMIN — DOXYCYCLINE 100 MG: 100 TABLET, FILM COATED ORAL at 12:27

## 2024-05-30 RX ADMIN — CEFTRIAXONE SODIUM 500 MG: 500 INJECTION, POWDER, FOR SOLUTION INTRAMUSCULAR; INTRAVENOUS at 12:27

## 2024-05-30 RX ADMIN — LIDOCAINE HYDROCHLORIDE 1 ML: 10 INJECTION, SOLUTION EPIDURAL; INFILTRATION; INTRACAUDAL at 12:27

## 2024-05-30 RX ADMIN — PENICILLIN G BENZATHINE 2.4 MILLION UNITS: 2400000 INJECTION, SUSPENSION INTRAMUSCULAR at 14:19

## 2024-05-30 ASSESSMENT — FIBROSIS 4 INDEX: FIB4 SCORE: 1.08

## 2024-05-30 NOTE — ED TRIAGE NOTES
Lnua River  34 y.o. female  Chief Complaint   Patient presents with    Exposure to STD     Pt states that several months ago she had sex with someone who may have syphilis. Pt states she is having vaginal itching and burning with urination but denies any abnormal dc.        Vitals:    05/30/24 1108   BP: 127/81   Pulse: 94   Resp: 18   Temp: 37 °C (98.6 °F)   SpO2: 94%       Patient educated on triage process and encouraged to alert staff of any changes in condition.

## 2024-05-30 NOTE — ED NOTES
Patient discharged home per ERP.  Discharge teaching and education discussed with patient. POC discussed.   Patient verbalized understanding of discharge teaching and education. No other questions at this time.     RX for doxycycline sent to pt's pharmacy.   Referral made to [facility] for follow up in 1 week & phone number provided.     VSS. Patient alert and oriented. Patient arranged ride for self. Able to ambulate off unit safely with steady gait.

## 2024-05-30 NOTE — ED PROVIDER NOTES
ER Provider Note    Scribed for Dr. Altagracia Arriaga D.O. by Nyla Chanel. 5/30/2024  11:58 AM    Primary Care Provider: Pcp Pt States None    CHIEF COMPLAINT  Chief Complaint   Patient presents with    Exposure to STD     Pt states that several months ago she had sex with someone who may have syphilis. Pt states she is having vaginal itching and burning with urination but denies any abnormal dc.        EXTERNAL RECORDS REVIEWED  External ED Note Patient seen frequently here and at surrounding ED systems. Most frequently seen on 2/25/24 for hand swelling and pain.    HPI/ROS  LIMITATION TO HISTORY   Select: : None    OUTSIDE HISTORIAN(S):  None    Luna River is a 34 y.o. female who presents to the ED following exposure to STD 6-7 months ago. She reports having sexual intercourse with someone who she thinks had syphilis. She said that she attempted to get her blood drawn out patient but they did not have an US to draw her blood. She was told she had chlamydia but was not treated for it, because she got mad. She has symptoms of itchiness, burning with urination, and vaginal discharge, but no lesions.     PAST MEDICAL HISTORY  Past Medical History:   Diagnosis Date    Anorexia     Bipolar 2 disorder (HCC)     bulemia     Hepatitis C     Hepatitis C     Impetigo     Liver disease     Major depressive disorder     Mood disorder (HCC)     MRSA infection     Oppositional defiant disorder     Polysubstance abuse (HCC)     Post-traumatic stress disorder     Schizoaffective disorder     Scoliosis        SURGICAL HISTORY  Past Surgical History:   Procedure Laterality Date    SUBMANDIBLE ABSCESS INCISION AND DRAINAGE N/A 7/24/2023    Procedure: INCISION AND DRAINAGE, ABSCESS, SUBMANDIBULAR REGION;  Surgeon: Truong Jarrell D.D.S.;  Location: SURGERY [facility];  Service: Dental    IRRIGATION & DEBRIDEMENT GENERAL Bilateral 6/8/2022    Procedure: IRRIGATION AND DEBRIDEMENT, MULTIPLE WOUND BILATERAL LOWER EXTREMITIES;   "Surgeon: Dakota Cortes M.D.;  Location: SURGERY [facility];  Service: Orthopedics    IRRIGATION & DEBRIDEMENT GENERAL Right 2/23/2022    Procedure: IRRIGATION AND DEBRIDEMENT, WOUND- RIGHT THIGH AND BUTTOCK;  Surgeon: Dakota Cortes M.D.;  Location: SURGERY [facility];  Service: Orthopedics    YONATAN BY LAPAROSCOPY  2/1/2013    Performed by Yves Rooney M.D. at SURGERY [facility] ORS    OTHER      dental    WI REMOVAL OF TONSILS,<13 Y/O      TONSILLECTOMY         FAMILY HISTORY  History reviewed. No pertinent family history.    SOCIAL HISTORY   reports that she has been smoking cigarettes. She has a 1.8 pack-year smoking history. She has never used smokeless tobacco. She reports that she does not currently use alcohol. She reports that she does not currently use drugs after having used the following drugs: Intravenous and Injected (Skin Popping).    CURRENT MEDICATIONS  Discharge Medication List as of 5/30/2024  2:25 PM          ALLERGIES  Clonazepam, Gabapentin, Gabadone, and Tiagabine hydrochloride    PHYSICAL EXAM  /81   Pulse 94   Temp 37 °C (98.6 °F) (Temporal)   Resp 18   Ht 1.753 m (5' 9\")   Wt 65.1 kg (143 lb 8.3 oz)   SpO2 94%   BMI 21.19 kg/m²   Constitutional: Patient is a thin unhealthy female in no acute distress  HENT: Normocephalic, atraumatic.  Moist oral mucosa  Cardiovascular: Normal heart rate and Regular rhythm. No murmur,   Thorax & Lungs: Clear and equal breath sounds with good excursion. No respiratory distress, no rhonchi, wheezing or rales.  Abdomen: Bowel sounds normal in all four quadrants. Soft,nontender, no rebound , guarding, palpable masses.   Skin: Warm, Dry, No erythema, No rashes.    Extremities: Peripheral pulses 4/4 No edema, No tenderness,    Neurologic: Alert & oriented x 3, Normal motor function, Normal sensory function,   Psychiatric: Affect odd, agitated    DIAGNOSTIC STUDIES & PROCEDURES    Labs:   Results for orders placed or performed during the " hospital encounter of 05/30/24   Chlamydia/GC, PCR (Urine)    Specimen: Urine   Result Value Ref Range    Source Urine    T.PALLIDUM AB KARTIK (Syphilis)   Result Value Ref Range    Syphilis, Treponemal Qual Reactive (A) Non-Reactive   HIV AG/AB Combo Assay Screening   Result Value Ref Range    HIV Ag/Ab Combo Assay Non-Reactive Non Reactive   URINALYSIS (UA)    Specimen: Urine   Result Value Ref Range    Color DK Yellow     Character Clear     Specific Gravity 1.033 <1.035    Ph 5.5 5.0 - 8.0    Glucose Negative Negative mg/dL    Ketones Trace (A) Negative mg/dL    Protein 30 (A) Negative mg/dL    Bilirubin Negative Negative    Urobilinogen, Urine 1.0 Negative    Nitrite Negative Negative    Leukocyte Esterase Negative Negative    Occult Blood Moderate (A) Negative    Micro Urine Req Microscopic    URINE MICROSCOPIC (W/UA)   Result Value Ref Range    WBC 10-20 (A) /hpf    RBC 20-50 (A) /hpf    Bacteria Few (A) None /hpf    Epithelial Cells Few /hpf    Mucous Threads Moderate /hpf    Hyaline Cast 0-2 /lpf   All labs reviewed by me.      COURSE & MEDICAL DECISION MAKING      INITIAL ASSESSMENT AND PLAN  Care Narrative:       11:58 AM - Patient seen and evaluated at bedside. Discussed plan of care, including labs to rule out various STI's and treatment for her diagnosed chlamydia. Patient agrees to plan of care. Patient will be treated with for her symptoms. Ordered UA, Chlamydia/GC, T. Pallidum, and HIV to evaluate. Differential diagnoses include but are not limited to: Chlamydia, Syphilis, Gonorrhea, HIV, UTI    12:05 PM- Ordered for Rocephin and Doxycycline due to positive Chlamydia in the clinic.  Laboratories were eventually obtained and sent and she was positive for syphilis.,  HIV is negative.  2:06 PM - Patient reevaluated at bedside. Discussed her syphilis results with her and treatment course. Discussed plan for discharge; I advised the patient to follow-up with [facility], and to return to the [facility] ED with any  new or worsening symptoms. Patient was given the opportunity for questions. I addressed all questions or concerns at this time and they verbalize agreement to the plan of care.                    DISPOSITION AND DISCUSSIONS  I have discussed management of the patient with the following physicians and CATHY's: None     Discussion of management with other [facility] or appropriate source(s): None     Escalation of care considered, and ultimately not performed: acute inpatient care management, however at this time, the patient is most appropriate for outpatient management.    Barriers to care at this time, including but not limited to: Patient does not have established PCP and Patient lacks financial resources.     The patient will return for new or worsening symptoms and is stable at the time of discharge.    The patient is referred to a primary physician for blood pressure management, diabetic screening, and for all other preventative health concerns.      DISPOSITION:  Patient will be discharged home in stable condition.    FOLLOW UP:  [address] [address]  [phone]  Schedule an appointment as soon as possible for a visit in 1 week  For recheck      OUTPATIENT MEDICATIONS:  Discharge Medication List as of 5/30/2024  2:25 PM        START taking these medications    Details   doxycycline (MONODOX) 100 MG capsule Take 1 Capsule by mouth 2 times a day. Take with food, Disp-20 Capsule, R-0, Normal           FINAL IMPRESSION   1. STI (sexually transmitted infection)    2. Syphilis    3. Chlamydia infection         Nyla BOONE (Danyell), am scribing for, and in the presence of, Altagracia Arriaga D.O..    Electronically signed by: Nyla Pacheco), 5/30/2024    Altagracia BOONE D.O. personally performed the services described in this documentation, as scribed by Nyla Chanel in my presence, and it is both accurate and complete.    The note accurately reflects  work and decisions made by me.  Altagracia Arriaga D.O.  5/30/2024  7:23 PM

## 2024-05-30 NOTE — LETTER
6/3/2024               Luna River  [address] [place] [address]        Dear Luna (MR#[id number])    As we have been unable to contact you by phone, this letter is sent in regards to your, recent visit to the [facility] Emergency Department on 5/30/2024. During the visit, tests were performed to assist the physician in your medical diagnosis. A review of your tests requires that we notify you of the following:    Your culture test was POSITIVE for Chlamydia, a sexually transmitted infection. This was treated appropriately in the Emergency Department and with the antibiotics prescribed for you (doxycycline) on discharge. It is important that you continue taking your antibiotic until it is finished..      Based on the above findings it is recommended that you seek testing for the presence of additional sexually transmitted infections, hepatitis, and HIV from the Health Department. Also, it is advised that you inform your sexual partner(s) within the previous 60 days of the above findings and direct them to the Health Department for testing, and to abstain from sexual intercourse seven days after the completion of antibiotic treatment. Should your symptoms progress, it is important that you follow up with your primary care physician, your local urgent care office, or return to the emergency department for further work up in order to prevent long term health issues.      Additionally, patients who are HIV negative and have been diagnosed with a sexually transmitted infection (STI) in the past 6 months are considered for PrEP.  PrEP stands for Pre-Exposure Prophylaxis. It is a once-daily pill regimen that can help you stay HIV-negative. When taken as prescribed, PrEP has been shown to be safe and highly effective against jaye HIV. While PrEP does not protect against other sexually transmitted infections or unwanted pregnancy, it can be paired with condoms and several other prevention strategies for  additional protection. We have a clinic here in [facility] that can help you obtain this medication, if interested please contact the number below.       Thank you for your cooperation in the matter.    Sincerely,  ED Culture Follow-Up Staff  Quentin Benton, Briana    [facility], Emergency Department  [address] [place] [phone] (ED Culture Line)

## 2024-05-30 NOTE — ED NOTES
Patient ambulated to room PUR 81 with steady gait and without incident. Urine sample collected and sent to lab, chart up for ERP.

## 2024-05-31 LAB
C TRACH DNA SPEC QL NAA+PROBE: POSITIVE
N GONORRHOEA DNA SPEC QL NAA+PROBE: NEGATIVE
RPR SER QL: NON REACTIVE
SPECIMEN SOURCE: ABNORMAL

## 2024-06-01 LAB — T PALLIDUM AB SER QL AGGL: REACTIVE

## 2024-06-03 NOTE — ED NOTES
"ED Positive Culture Follow-up/Notification Note:    Date: 6/3/24     Patient seen in the ED on 5/30/2024 for evaluation of possible STD exposure about 6-7 months ago. Per ED provider note, \"She reports having sexual intercourse with someone who she thinks had syphilis. She said that she attempted to get her blood drawn out patient but they did not have an US to draw her blood. She was told she had chlamydia but was not treated for it, because she got mad. She has symptoms of itchiness, burning with urination, and vaginal discharge, but no lesions.\"  1. STI (sexually transmitted infection)    2. Syphilis    3. Chlamydia infection       Discharge Medication List as of 5/30/2024  2:25 PM        START taking these medications    Details   doxycycline (MONODOX) 100 MG capsule Take 1 Capsule by mouth 2 times a day. Take with food, Disp-20 Capsule, R-0, Normal             Allergies: Clonazepam, Gabapentin, Gabadone, and Tiagabine hydrochloride     Vitals:    05/30/24 1108 05/30/24 1117 05/30/24 1400   BP: 127/81  114/75   Pulse: 94  72   Resp: 18  16   Temp: 37 °C (98.6 °F)  36.1 °C (97 °F)   TempSrc: Temporal  Temporal   SpO2: 94%  96%   Weight:  65.1 kg (143 lb 8.3 oz)    Height:  1.753 m (5' 9\")        Final cultures:   Results       Procedure Component Value Units Date/Time    Chlamydia/GC, PCR (Urine) [[id number]]  (Abnormal) Collected: 05/30/24 1155    Order Status: Completed Specimen: Urine Updated: 05/31/24 1805     C. trachomatis by PCR POSITIVE     Gc By Dna Probe Negative     Source Urine    URINALYSIS (UA) [[id number]]  (Abnormal) Collected: 05/30/24 1155    Order Status: Completed Specimen: Urine Updated: 05/30/24 1234     Color DK Yellow     Character Clear     Specific Gravity 1.033     Ph 5.5     Glucose Negative mg/dL      Ketones Trace mg/dL      Protein 30 mg/dL      Bilirubin Negative     Urobilinogen, Urine 1.0     Nitrite Negative     Leukocyte Esterase Negative     Occult Blood Moderate     Micro Urine " Req Microscopic            Plan:   Patient tested positive for chlamydia in the ER. Appropriately treated with ceftriaxone and doxycycline in ER. No additional treatment necessary.  Left voicemail for patient via telephone. Sent letter to  the patient to abstain from sexual intercourse 7 days after antibiotic therapy is completed, to notify any sexual partners within the last 60 days to go the [facility] for testing, to seek further HIV/STD testing, and to seek medical attention if symptoms persist. Quentin Benton, PharmD

## 2024-09-25 NOTE — ED NOTES
Gave PO intake prior to d/c   
Med Rec completed per patient   Allergies reviewed  No ORAL antibiotics in last 30 days    Patient is not currently taking any daily medications at this time   
Pt A/Ox3 unlaboured breathing VSS. Agrees and understands d/c teaching. Ambulated off unit independently. IG removed, dressing applied c/d/i.     
Pt able to ambulate throughout the department  with steady gait.   
Pt changed into gown and placed on cardiac monitor. Denies SI.   
Report received from Sumit GEE. Pt is arousable, A/Ox3, speaking in full sentences, follows commands and responds appropriately to questions. Resp are even and unlabored.      This RN masked and in appropriate PPE during encounter.    
X ray at bedside.   
Patent
normal...

## 2024-09-29 ENCOUNTER — HOSPITAL ENCOUNTER (EMERGENCY)
Facility: MEDICAL CENTER | Age: 34
End: 2024-09-29
Attending: EMERGENCY MEDICINE
Payer: COMMERCIAL

## 2024-09-29 ENCOUNTER — APPOINTMENT (OUTPATIENT)
Dept: RADIOLOGY | Facility: MEDICAL CENTER | Age: 34
End: 2024-09-29
Attending: EMERGENCY MEDICINE
Payer: COMMERCIAL

## 2024-09-29 VITALS
TEMPERATURE: 97 F | WEIGHT: 137.35 LBS | SYSTOLIC BLOOD PRESSURE: 142 MMHG | HEART RATE: 67 BPM | RESPIRATION RATE: 16 BRPM | BODY MASS INDEX: 20.28 KG/M2 | DIASTOLIC BLOOD PRESSURE: 87 MMHG | OXYGEN SATURATION: 95 %

## 2024-09-29 DIAGNOSIS — J06.9 VIRAL URI WITH COUGH: ICD-10-CM

## 2024-09-29 LAB
FLUAV RNA SPEC QL NAA+PROBE: NEGATIVE
FLUBV RNA SPEC QL NAA+PROBE: NEGATIVE
RSV RNA SPEC QL NAA+PROBE: NEGATIVE
SARS-COV-2 RNA RESP QL NAA+PROBE: NOTDETECTED

## 2024-09-29 PROCEDURE — 71045 X-RAY EXAM CHEST 1 VIEW: CPT

## 2024-09-29 PROCEDURE — 99283 EMERGENCY DEPT VISIT LOW MDM: CPT

## 2024-09-29 PROCEDURE — 0241U HCHG SARS-COV-2 COVID-19 NFCT DS RESP RNA 4 TRGT ED POC: CPT

## 2024-09-29 RX ORDER — BENZONATATE 100 MG/1
100 CAPSULE ORAL 3 TIMES DAILY PRN
Qty: 15 CAPSULE | Refills: 1 | Status: SHIPPED | OUTPATIENT
Start: 2024-09-29

## 2024-09-29 ASSESSMENT — FIBROSIS 4 INDEX: FIB4 SCORE: 1.08

## 2024-09-29 NOTE — ED TRIAGE NOTES
Ambulates to triage  Chief Complaint   Patient presents with    Cough     Cough x1 week, not taking any OTC meds, denies any fevers at home     VSS, denies any CP or SOB.

## 2024-09-30 NOTE — ED NOTES
Pt discharged home, discharge and follow up care instructions given, prescriptions sent to the pharmacy of choice, pt verbalized understanding, pt ambulated out of ED independently.

## 2024-09-30 NOTE — ED PROVIDER NOTES
"ED Provider Note    CHIEF COMPLAINT  Chief Complaint   Patient presents with    Cough     Cough x1 week, not taking any OTC meds, denies any fevers at home           HPI/ROS  LIMITATION TO HISTORY   Select: : None  OUTSIDE HISTORIAN(S):  None    [place] is a 34 y.o. female who presents with 10 days of cough, productive green sputum.  No shortness of breath, no chest pain, no pleurisy.  Patient is a smoker.  She denies history of DVT or pulmonary embolism.  No leg swelling.  She denies history of cardiac or lung illness.  Patient states she is here today after a week and a half of symptoms because \"I could get a ride\".  No abdominal pain.  No vomiting or diarrhea.  She has mild rhinorrhea and sore throat.  No difficulties drinking or eating.    PAST MEDICAL HISTORY   has a past medical history of Anorexia, Bipolar 2 disorder (HCC), bulemia, Hepatitis C, Hepatitis C, Impetigo, Liver disease, Major depressive disorder, Mood disorder (HCC), MRSA infection, Oppositional defiant disorder, Polysubstance abuse (HCC), Post-traumatic stress disorder, Schizoaffective disorder, and Scoliosis.    SURGICAL HISTORY   has a past surgical history that includes tonsillectomy; removal of tonsils,<13 y/o; other; anthony by laparoscopy (2/1/2013); irrigation & debridement general (Right, 2/23/2022); irrigation & debridement general (Bilateral, 6/8/2022); and submandible abscess incision and drainage (N/A, 7/24/2023).    FAMILY HISTORY  History reviewed. No pertinent family history.    SOCIAL HISTORY  Social History     Tobacco Use    Smoking status: Some Days     Current packs/day: 0.25     Average packs/day: 0.3 packs/day for 7.0 years (1.8 ttl pk-yrs)     Types: Cigarettes    Smokeless tobacco: Never   Vaping Use    Vaping status: Every Day    Substances: Nicotine   Substance and Sexual Activity    Alcohol use: Not Currently     Comment: rarely    Drug use: Not Currently     Types: Intravenous, Injected (Skin Popping), " Inhaled     Comment: Heroin, fentanyl, meth    Sexual activity: Not on file       CURRENT MEDICATIONS  Home Medications       Reviewed by Marjan Gunderson R.N. (Registered Nurse) on 09/29/24 at 1646  Med List Status: Complete     Medication Last Dose Status        Patient Juan Taking any Medications                           ALLERGIES  Allergies   Allergen Reactions    Clonazepam Unspecified     Pt states that she overdosed on it before. Furthermore, per historical, pt had seizure-like activity while taking Clonazepam as a child.    Gabapentin Unspecified     Causes seizures.    Gabadone Unspecified     Unknown reaction.    Tiagabine Hydrochloride Unspecified     Unknown reaction.       PHYSICAL EXAM  VITAL SIGNS: BP (!) 134/94   Pulse 94   Temp 36 °C (96.8 °F) (Temporal)   Resp 16   Wt 62.3 kg (137 lb 5.6 oz)   LMP 09/27/2024 (Exact Date)   SpO2 96%   BMI 20.28 kg/m²    Respiratory: Slightly coarse or breath sounds.  No crackles  Cardiac: Regular rate, regular rhythm  GI: Nontender, no distention  Skin: No rash, no cyanosis  Neurologic: Strength intact, speech clear  Psychiatric: Normal mood.  Cooperative    EKG/LABS  Results for orders placed or performed during the hospital encounter of 09/29/24   POC CoV-2, FLU A/B, RSV by PCR   Result Value Ref Range    POC Influenza A RNA, PCR Negative Negative    POC Influenza B RNA, PCR Negative Negative    POC RSV, by PCR Negative Negative    POC SARS-CoV-2, PCR NotDetected NotDetected       I have independently interpreted this EKG    RADIOLOGY/PROCEDURES   I have independently interpreted the diagnostic imaging associated with this visit and am waiting the final reading from the radiologist.   My preliminary interpretation is as follows: Chest x-ray negative for pneumonia    Radiologist interpretation:  DX-CHEST-PORTABLE (1 VIEW)   Final Result         1. No acute cardiopulmonary abnormalities are identified.          COURSE & MEDICAL DECISION  MAKING    ASSESSMENT, COURSE AND PLAN  Care Narrative: Patient presents with productive cough, present now for a week and a half.  Differential including both bacterial and viral illness.  She has tested negative for COVID and influenza.  Chest x-ray was negative for pneumonia.  Patient does not meet criteria for antibiotics.  She is afebrile with normal pulse oximetry and stable for discharge.  There is no evidence of sepsis.  She has been prescribed Tessalon to help with cough suppression.  I have encouraged her to stop smoking.  Patient is well-appearing upon discharge, advised to see a doctor for recheck if not better in 1 week          ADDITIONAL PROBLEMS MANAGED  4 minutes were spent on discussion of smoking cessation with the patient.    DISPOSITION AND DISCUSSIONS    Escalation of care considered, and ultimately not performed:acute inpatient care management, however at this time, the patient is most appropriate for outpatient management    Barriers to care at this time, including but not limited to: Patient does not have established PCP.     Decision tools and prescription drugs considered including, but not limited to: Antibiotics were not indicated, no evidence of bacterial infection, likely viral etiology .    FINAL DIAGNOSIS  1. Viral URI with cough         Electronically signed by: Sher Badillo M.D., 9/29/2024 5:15 PM

## 2024-09-30 NOTE — DISCHARGE INSTRUCTIONS
See a doctor for recheck if not better in 1 week, please return for difficulty breathing or any concerns.    Please stop smoking

## (undated) DEVICE — GLOVE BIOGEL PI INDICATOR SZ 7.5 SURGICAL PF LF -(50/BX 4BX/CA)

## (undated) DEVICE — MASK ANESTHESIA ADULT  - (100/CA)

## (undated) DEVICE — SUTURE 2-0 ETHILON FS - (36/BX) 18 INCH

## (undated) DEVICE — BOVIE BLADE COATED - (50/PK)

## (undated) DEVICE — CHLORAPREP 26 ML APPLICATOR - ORANGE TINT(25/CA)

## (undated) DEVICE — GLOVE BIOGEL SZ 7 SURGICAL PF LTX - (50PR/BX 4BX/CA)

## (undated) DEVICE — IV TUBING HI-FLO RATE W/CLAMP (50/CA)

## (undated) DEVICE — SET EXTENSION WITH 2 PORTS (48EA/CA) ***PART #2C8610 IS A SUBSTITUTE*****

## (undated) DEVICE — SWAB CULTURE AMIES ESWAB (50EA/PK)

## (undated) DEVICE — GLOVE BIOGEL PI ORTHO SZ 6 1/2 SURGICAL PF LF (40PR/BX)

## (undated) DEVICE — CANISTER SUCTION 3000ML MECHANICAL FILTER AUTO SHUTOFF MEDI-VAC NONSTERILE LF DISP  (40EA/CA)

## (undated) DEVICE — SLEEVE, VASO, THIGH, MED

## (undated) DEVICE — SENSOR SPO2 NEO LNCS ADHESIVE (20/BX) SEE USER NOTES

## (undated) DEVICE — STAPLER SKIN DISP - (6/BX 10BX/CA) VISISTAT

## (undated) DEVICE — SENSOR OXIMETER ADULT SPO2 RD SET (20EA/BX)

## (undated) DEVICE — ELECTRODE DUAL RETURN W/ CORD - (50/PK)

## (undated) DEVICE — DRAPE SURGICAL U 77X120 - (10/CA)

## (undated) DEVICE — SET LEADWIRE 5 LEAD BEDSIDE DISPOSABLE ECG (1SET OF 5/EA)

## (undated) DEVICE — TOWEL STOP TIMEOUT SAFETY FLAG (40EA/CA)

## (undated) DEVICE — MASK, LARYNGEAL AIRWAY #4

## (undated) DEVICE — CONTAINER SPECIMEN BAG OR - STERILE 4 OZ W/LID (100EA/CA)

## (undated) DEVICE — ELECTRODE 850 FOAM ADHESIVE - HYDROGEL RADIOTRNSPRNT (50/PK)

## (undated) DEVICE — NEPTUNE 4 PORT MANIFOLD - (20/PK)

## (undated) DEVICE — SODIUM CHL. IRRIGATION 0.9% 3000ML (4EA/CA 65CA/PF)

## (undated) DEVICE — GOWN SURGEONS X-LARGE - DISP. (30/CA)

## (undated) DEVICE — KIT ANESTHESIA W/CIRCUIT & 3/LT BAG W/FILTER (20EA/CA)

## (undated) DEVICE — TRAY SKIN SCRUB PVP WET (20EA/CA) PART #DYND70356 DISCONTINUED

## (undated) DEVICE — PACK LOWER EXTREMITY - (2/CA)

## (undated) DEVICE — GLOVE BIOGEL ECLIPSE PF LATEX SIZE 7.5

## (undated) DEVICE — GOWN WARMING STANDARD FLEX - (30/CA)

## (undated) DEVICE — SUCTION INSTRUMENT YANKAUER BULBOUS TIP W/O VENT (50EA/CA)

## (undated) DEVICE — GLOVE BIOGEL SZ 7.5 SURGICAL PF LTX - (50PR/BX 4BX/CA)

## (undated) DEVICE — DRAPE STRLE REG TOWEL 18X24 - (10/BX 4BX/CA)"

## (undated) DEVICE — JAW BRA #93

## (undated) DEVICE — SODIUM CHL IRRIGATION 0.9% 1000ML (12EA/CA)

## (undated) DEVICE — SWAB ANAEROBIC SPEC.COLLECTOR - (25/PK 4PK/CA 100EA/CA)

## (undated) DEVICE — PROTECTOR ULNA NERVE - (36PR/CA)

## (undated) DEVICE — TIP INTPLS HFLO ML ORFC BTRY - (12/CS)  FOR SURGILAV

## (undated) DEVICE — LACTATED RINGERS INJ 1000 ML - (14EA/CA 60CA/PF)

## (undated) DEVICE — GLOVE BIOGEL ECLIPSE  PF LATEX SIZE 6.5 (50PR/BX)

## (undated) DEVICE — PAD LAP STERILE 18 X 18 - (5/PK 40PK/CA)

## (undated) DEVICE — TUBING CLEARLINK DUO-VENT - C-FLO (48EA/CA)

## (undated) DEVICE — BANDAGE ROLL STERILE BULKEE 4.5 IN X 4 YD (100EA/CA)

## (undated) DEVICE — PACK MINOR BASIN - (2EA/CA)

## (undated) DEVICE — GLOVE BIOGEL INDICATOR SZ 7.5 SURGICAL PF LTX - (50PR/BX 4BX/CA)

## (undated) DEVICE — BLADE SURGICAL #11 - (50/BX)

## (undated) DEVICE — GLOVE SZ 7.5 BIOGEL PI MICRO - PF LF (50PR/BX)

## (undated) DEVICE — SYRINGE ASEPTO - (50EA/CA

## (undated) DEVICE — DRAPE MAYO STAND - (30/CA)

## (undated) DEVICE — STOPCOCK 3-WAY W/SWIVEL LEVER LOCK (50EA/CA)

## (undated) DEVICE — COVER LIGHT HANDLE ALC PLUS DISP (18EA/BX)

## (undated) DEVICE — SUTURE GENERAL

## (undated) DEVICE — TRAY MULTI-LUMEN 7FR PRESSURE W/MAX BARRIER AND BIOPATCH - (5/CA)

## (undated) DEVICE — TOWELS CLOTH SURGICAL - (4/PK 20PK/CA)

## (undated) DEVICE — GLOVE SIZE 7.0 SURGEON ACCELERATOR FREE GREEN (50PR/BX 4BX/CA)

## (undated) DEVICE — BAG SPONGE COUNT 10.25 X 32 - BLUE (250/CA)

## (undated) DEVICE — BLADE SURGICAL #15 - (50/BX 3BX/CA)

## (undated) DEVICE — HANDPIECE 10FT INTPLS SCT PLS IRRIGATION HAND CONTROL SET (6/PK)

## (undated) DEVICE — HEAD HOLDER JUNIOR/ADULT